# Patient Record
Sex: MALE | Race: WHITE | NOT HISPANIC OR LATINO | Employment: OTHER | URBAN - NONMETROPOLITAN AREA
[De-identification: names, ages, dates, MRNs, and addresses within clinical notes are randomized per-mention and may not be internally consistent; named-entity substitution may affect disease eponyms.]

---

## 2018-01-05 ENCOUNTER — APPOINTMENT (OUTPATIENT)
Dept: GENERAL RADIOLOGY | Facility: HOSPITAL | Age: 83
End: 2018-01-05

## 2018-01-05 ENCOUNTER — HOSPITAL ENCOUNTER (EMERGENCY)
Facility: HOSPITAL | Age: 83
Discharge: HOME | End: 2018-01-05
Attending: EMERGENCY MEDICINE | Admitting: EMERGENCY MEDICINE

## 2018-01-05 VITALS
HEIGHT: 66 IN | BODY MASS INDEX: 25.08 KG/M2 | HEART RATE: 68 BPM | OXYGEN SATURATION: 92 % | RESPIRATION RATE: 18 BRPM | DIASTOLIC BLOOD PRESSURE: 69 MMHG | SYSTOLIC BLOOD PRESSURE: 147 MMHG | WEIGHT: 156.06 LBS | TEMPERATURE: 97.4 F
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
## 2018-01-05 DIAGNOSIS — S29.011A MUSCLE STRAIN OF CHEST WALL, INITIAL ENCOUNTER: ICD-10-CM

## 2018-01-05 PROCEDURE — 96375 TX/PRO/DX INJ NEW DRUG ADDON: CPT

## 2018-01-05 PROCEDURE — 99283 EMERGENCY DEPT VISIT LOW MDM: CPT

## 2018-01-05 PROCEDURE — 96374 THER/PROPH/DIAG INJ IV PUSH: CPT

## 2018-01-05 PROCEDURE — 25010000002 MORPHINE PER 10 MG: Performed by: EMERGENCY MEDICINE

## 2018-01-05 PROCEDURE — 25010000002 ONDANSETRON PER 1 MG: Performed by: EMERGENCY MEDICINE

## 2018-01-05 PROCEDURE — 71101 X-RAY EXAM UNILAT RIBS/CHEST: CPT

## 2018-01-05 RX ORDER — HYDROCODONE BITARTRATE AND ACETAMINOPHEN 5; 325 MG/1; MG/1
1 TABLET ORAL EVERY 6 HOURS PRN
Qty: 10 TABLET | Refills: 0 | Status: SHIPPED | OUTPATIENT
Start: 2018-01-05 | End: 2018-04-16

## 2018-01-05 RX ORDER — ALBUTEROL SULFATE 90 UG/1
2 AEROSOL, METERED RESPIRATORY (INHALATION) EVERY 6 HOURS PRN
Qty: 8 G | Refills: 0 | Status: SHIPPED | OUTPATIENT
Start: 2018-01-05 | End: 2021-01-01

## 2018-01-05 RX ORDER — ONDANSETRON 2 MG/ML
4 INJECTION INTRAMUSCULAR; INTRAVENOUS ONCE
Status: COMPLETED | OUTPATIENT
Start: 2018-01-05 | End: 2018-01-05

## 2018-01-05 RX ORDER — MORPHINE SULFATE 8 MG/ML
4 INJECTION INTRAMUSCULAR; INTRAVENOUS; SUBCUTANEOUS ONCE
Status: COMPLETED | OUTPATIENT
Start: 2018-01-05 | End: 2018-01-05

## 2018-01-05 RX ADMIN — MORPHINE SULFATE 4 MG: 8 INJECTION, SOLUTION INTRAMUSCULAR; INTRAVENOUS at 11:19

## 2018-01-05 RX ADMIN — ONDANSETRON 4 MG: 2 INJECTION INTRAMUSCULAR; INTRAVENOUS at 11:20
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
## 2018-01-05 NOTE — DISCHARGE INSTRUCTIONS
Take pain medications as needed.  Use inhaler regularly.  Do deep breathing exercises.  Follow-up with your primary care physician for further evaluation if it does not feel much improvement.  She might have a occult rib fracture which could be more visible on none x-ray after a week or so.  It needs only pain medication and deep breathing exercises.

## 2018-04-13 ENCOUNTER — CONSULT (OUTPATIENT)
Dept: SURGERY | Facility: CLINIC | Age: 83
End: 2018-04-13

## 2018-04-13 VITALS
HEIGHT: 66 IN | SYSTOLIC BLOOD PRESSURE: 120 MMHG | DIASTOLIC BLOOD PRESSURE: 80 MMHG | WEIGHT: 149.4 LBS | BODY MASS INDEX: 24.01 KG/M2

## 2018-04-13 DIAGNOSIS — K40.90 NON-RECURRENT UNILATERAL INGUINAL HERNIA WITHOUT OBSTRUCTION OR GANGRENE: Primary | ICD-10-CM

## 2018-04-13 PROCEDURE — 99214 OFFICE O/P EST MOD 30 MIN: CPT | Performed by: SURGERY

## 2018-04-13 RX ORDER — GLIPIZIDE 5 MG/1
5 TABLET ORAL
COMMUNITY
End: 2021-01-01

## 2018-04-13 RX ORDER — ACETAMINOPHEN 325 MG/1
650 TABLET ORAL ONCE
Status: CANCELLED | OUTPATIENT
Start: 2018-04-19 | End: 2018-04-19

## 2018-04-13 RX ORDER — LISINOPRIL 10 MG/1
10 TABLET ORAL DAILY
COMMUNITY
Start: 2018-03-13 | End: 2021-01-01 | Stop reason: HOSPADM

## 2018-04-13 RX ORDER — FINASTERIDE 5 MG/1
5 TABLET, FILM COATED ORAL DAILY
COMMUNITY
Start: 2018-03-13

## 2018-04-13 RX ORDER — NIFEDIPINE 30 MG/1
TABLET, EXTENDED RELEASE ORAL
COMMUNITY
Start: 2018-01-19 | End: 2018-04-16

## 2018-04-13 RX ORDER — TAMSULOSIN HYDROCHLORIDE 0.4 MG/1
1 CAPSULE ORAL NIGHTLY
COMMUNITY

## 2018-04-13 RX ORDER — CARVEDILOL 12.5 MG/1
12.5 TABLET ORAL 2 TIMES DAILY WITH MEALS
COMMUNITY
Start: 2018-03-13 | End: 2021-01-01 | Stop reason: HOSPADM

## 2018-04-13 RX ORDER — SODIUM CHLORIDE, SODIUM LACTATE, POTASSIUM CHLORIDE, CALCIUM CHLORIDE 600; 310; 30; 20 MG/100ML; MG/100ML; MG/100ML; MG/100ML
100 INJECTION, SOLUTION INTRAVENOUS CONTINUOUS
Status: CANCELLED | OUTPATIENT
Start: 2018-04-19

## 2018-04-16 ENCOUNTER — APPOINTMENT (OUTPATIENT)
Dept: PREADMISSION TESTING | Facility: HOSPITAL | Age: 83
End: 2018-04-16

## 2018-04-16 VITALS
BODY MASS INDEX: 25.61 KG/M2 | SYSTOLIC BLOOD PRESSURE: 130 MMHG | OXYGEN SATURATION: 96 % | HEIGHT: 64 IN | DIASTOLIC BLOOD PRESSURE: 62 MMHG | RESPIRATION RATE: 14 BRPM | HEART RATE: 72 BPM | WEIGHT: 150 LBS

## 2018-04-16 DIAGNOSIS — K40.90 NON-RECURRENT UNILATERAL INGUINAL HERNIA WITHOUT OBSTRUCTION OR GANGRENE: ICD-10-CM

## 2018-04-16 LAB
ANION GAP SERPL CALCULATED.3IONS-SCNC: 14 MMOL/L (ref 5–15)
BUN BLD-MCNC: 13 MG/DL (ref 7–21)
BUN/CREAT SERPL: 19.4 (ref 7–25)
CALCIUM SPEC-SCNC: 9.7 MG/DL (ref 8.4–10.2)
CHLORIDE SERPL-SCNC: 96 MMOL/L (ref 95–110)
CO2 SERPL-SCNC: 29 MMOL/L (ref 22–31)
CREAT BLD-MCNC: 0.67 MG/DL (ref 0.7–1.3)
GFR SERPL CREATININE-BSD FRML MDRD: 114 ML/MIN/1.73 (ref 42–98)
GLUCOSE BLD-MCNC: 123 MG/DL (ref 60–100)
MRSA DNA SPEC QL NAA+PROBE: NEGATIVE
POTASSIUM BLD-SCNC: 4.3 MMOL/L (ref 3.5–5.1)
SODIUM BLD-SCNC: 139 MMOL/L (ref 137–145)

## 2018-04-16 PROCEDURE — 36415 COLL VENOUS BLD VENIPUNCTURE: CPT

## 2018-04-16 PROCEDURE — 87641 MR-STAPH DNA AMP PROBE: CPT | Performed by: SURGERY

## 2018-04-16 PROCEDURE — 93010 ELECTROCARDIOGRAM REPORT: CPT | Performed by: INTERNAL MEDICINE

## 2018-04-16 PROCEDURE — 93005 ELECTROCARDIOGRAM TRACING: CPT

## 2018-04-16 PROCEDURE — 80048 BASIC METABOLIC PNL TOTAL CA: CPT | Performed by: ANESTHESIOLOGY

## 2018-04-16 RX ORDER — HYDROCODONE BITARTRATE AND ACETAMINOPHEN 10; 325 MG/1; MG/1
1 TABLET ORAL EVERY 6 HOURS PRN
Status: ON HOLD | COMMUNITY
End: 2018-04-26

## 2018-04-16 RX ORDER — ASPIRIN 81 MG/1
81 TABLET ORAL DAILY
COMMUNITY
End: 2018-05-10 | Stop reason: HOSPADM

## 2018-04-16 RX ORDER — NIFEDIPINE 10 MG/1
10 CAPSULE ORAL DAILY
COMMUNITY
End: 2021-01-01

## 2018-04-16 NOTE — PAT
Dr. Bajwa here to review EKG and talk with patient. No new orders received. Chlorhexidine cloth instructions given, patient voiced understanding.

## 2018-04-16 NOTE — DISCHARGE INSTRUCTIONS
[facility]  Pre-op Information and Guidelines    You will be called after 2 p.m. the day before your surgery (Friday for Monday surgery) and notified of your time for arrival and approximate surgery time.  If you have not received a call by 4P.M., please contact Same Day Surgery at [phone] of if outside [facility] call [phone].    Please Follow these Important Safety Guidelines:    • The morning of your procedure, take only the medications listed below with   A sip of water:_____________________________________________       _COREG, NORCO, NIFEDIPINE__________________    • DO NOT eat or drink anything after 12:00 midnight the night before surgery  Specific instructions concerning drinking clear liquids will be discussed during  the pre-surgery instruction call the day before your surgery.    • If you take a blood thinner (ex. Plavix, Coumadin, aspirin), ask your doctor when to stop it before surgery  STOP DATE: _________________    • Only 2 visitors are allowed in patient rooms at a time  Your visitors will be asked to wait in the lobby until the admission process is complete with the exception of a parent with a child and patients in need of special assistance.    • YOU CANNOT DRIVE YOURSELF HOME  You must be accompanied by someone who will be responsible for driving you home after surgery and for your care at home.    • DO NOT chew gum, use breath mints, hard candy, or smoke the day of surgery  • DO NOT drink alcohol for at least 24 hours before your surgery  • DO NOT wear any jewelry and remove all body piercing before coming to the hospital  • DO NOT wear make-up to the hospital  • If you are having surgery on an extremity (arm/leg/foot) remove nail polish/artificial nails on the surgical side  • Clothing, glasses, contacts, dentures, and hairpieces must be removed before surgery  • Bathe the night before or the morning of your surgery and do not use powders/lotions on  skin.

## 2018-04-18 ENCOUNTER — ANESTHESIA EVENT (OUTPATIENT)
Dept: PERIOP | Facility: HOSPITAL | Age: 83
End: 2018-04-18

## 2018-04-19 ENCOUNTER — HOSPITAL ENCOUNTER (OUTPATIENT)
Facility: HOSPITAL | Age: 83
Setting detail: OBSERVATION
Discharge: HOME | End: 2018-04-26
Attending: SURGERY | Admitting: SURGERY

## 2018-04-19 ENCOUNTER — ANESTHESIA (OUTPATIENT)
Dept: PERIOP | Facility: HOSPITAL | Age: 83
End: 2018-04-19

## 2018-04-19 DIAGNOSIS — K40.90 NON-RECURRENT UNILATERAL INGUINAL HERNIA WITHOUT OBSTRUCTION OR GANGRENE: ICD-10-CM

## 2018-04-19 DIAGNOSIS — Z78.9 IMPAIRED MOBILITY AND ACTIVITIES OF DAILY LIVING: ICD-10-CM

## 2018-04-19 DIAGNOSIS — Z74.09 IMPAIRED PHYSICAL MOBILITY: ICD-10-CM

## 2018-04-19 DIAGNOSIS — R33.9 URINARY RETENTION: Primary | ICD-10-CM

## 2018-04-19 DIAGNOSIS — Z74.09 IMPAIRED MOBILITY AND ACTIVITIES OF DAILY LIVING: ICD-10-CM

## 2018-04-19 LAB
GLUCOSE BLDC GLUCOMTR-MCNC: 140 MG/DL (ref 70–130)
GLUCOSE BLDC GLUCOMTR-MCNC: 144 MG/DL (ref 70–130)
GLUCOSE BLDC GLUCOMTR-MCNC: 153 MG/DL (ref 70–130)
GLUCOSE BLDC GLUCOMTR-MCNC: 160 MG/DL (ref 70–130)

## 2018-04-19 PROCEDURE — 25010000002 HYDROMORPHONE PER 4 MG: Performed by: SURGERY

## 2018-04-19 PROCEDURE — 25010000002 ONDANSETRON PER 1 MG: Performed by: SURGERY

## 2018-04-19 PROCEDURE — 63710000001 INSULIN ASPART PER 5 UNITS: Performed by: SURGERY

## 2018-04-19 PROCEDURE — 88302 TISSUE EXAM BY PATHOLOGIST: CPT | Performed by: SURGERY

## 2018-04-19 PROCEDURE — 49525 REPAIR ING HERNIA SLIDING: CPT | Performed by: SURGERY

## 2018-04-19 PROCEDURE — 82962 GLUCOSE BLOOD TEST: CPT

## 2018-04-19 PROCEDURE — 25010000002 CEFOXITIN PER 1 G: Performed by: SURGERY

## 2018-04-19 PROCEDURE — 25010000002 ONDANSETRON PER 1 MG: Performed by: NURSE ANESTHETIST, CERTIFIED REGISTERED

## 2018-04-19 PROCEDURE — 25010000003 CEFAZOLIN PER 500 MG: Performed by: SURGERY

## 2018-04-19 PROCEDURE — 88302 TISSUE EXAM BY PATHOLOGIST: CPT | Performed by: PATHOLOGY

## 2018-04-19 PROCEDURE — 25010000002 PROPOFOL 10 MG/ML EMULSION: Performed by: NURSE ANESTHETIST, CERTIFIED REGISTERED

## 2018-04-19 PROCEDURE — 25010000002 NEOSTIGMINE PER 0.5 MG: Performed by: NURSE ANESTHETIST, CERTIFIED REGISTERED

## 2018-04-19 PROCEDURE — 25010000002 FENTANYL CITRATE (PF) 100 MCG/2ML SOLUTION: Performed by: NURSE ANESTHETIST, CERTIFIED REGISTERED

## 2018-04-19 PROCEDURE — 25010000002 DEXAMETHASONE PER 1 MG: Performed by: NURSE ANESTHETIST, CERTIFIED REGISTERED

## 2018-04-19 RX ORDER — ACETAMINOPHEN 325 MG/1
650 TABLET ORAL ONCE
Status: COMPLETED | OUTPATIENT
Start: 2018-04-19 | End: 2018-04-19

## 2018-04-19 RX ORDER — ONDANSETRON 4 MG/1
4 TABLET, ORALLY DISINTEGRATING ORAL EVERY 6 HOURS PRN
Status: DISCONTINUED | OUTPATIENT
Start: 2018-04-19 | End: 2018-04-26 | Stop reason: HOSPADM

## 2018-04-19 RX ORDER — NIFEDIPINE 10 MG/1
10 CAPSULE ORAL DAILY
Status: DISCONTINUED | OUTPATIENT
Start: 2018-04-19 | End: 2018-04-26 | Stop reason: HOSPADM

## 2018-04-19 RX ORDER — LIDOCAINE HYDROCHLORIDE 20 MG/ML
INJECTION, SOLUTION INFILTRATION; PERINEURAL AS NEEDED
Status: DISCONTINUED | OUTPATIENT
Start: 2018-04-19 | End: 2018-04-19 | Stop reason: SURG

## 2018-04-19 RX ORDER — PROPOFOL 10 MG/ML
VIAL (ML) INTRAVENOUS AS NEEDED
Status: DISCONTINUED | OUTPATIENT
Start: 2018-04-19 | End: 2018-04-19 | Stop reason: SURG

## 2018-04-19 RX ORDER — NALOXONE HCL 0.4 MG/ML
0.2 VIAL (ML) INJECTION AS NEEDED
Status: DISCONTINUED | OUTPATIENT
Start: 2018-04-19 | End: 2018-04-19 | Stop reason: HOSPADM

## 2018-04-19 RX ORDER — GLYCOPYRROLATE 0.2 MG/ML
INJECTION INTRAMUSCULAR; INTRAVENOUS AS NEEDED
Status: DISCONTINUED | OUTPATIENT
Start: 2018-04-19 | End: 2018-04-19 | Stop reason: SURG

## 2018-04-19 RX ORDER — ONDANSETRON 2 MG/ML
INJECTION INTRAMUSCULAR; INTRAVENOUS AS NEEDED
Status: DISCONTINUED | OUTPATIENT
Start: 2018-04-19 | End: 2018-04-19 | Stop reason: SURG

## 2018-04-19 RX ORDER — BUPIVACAINE HYDROCHLORIDE AND EPINEPHRINE 5; 5 MG/ML; UG/ML
INJECTION, SOLUTION EPIDURAL; INTRACAUDAL; PERINEURAL AS NEEDED
Status: DISCONTINUED | OUTPATIENT
Start: 2018-04-19 | End: 2018-04-26 | Stop reason: HOSPADM

## 2018-04-19 RX ORDER — HYDROCODONE BITARTRATE AND ACETAMINOPHEN 10; 325 MG/1; MG/1
1 TABLET ORAL EVERY 6 HOURS PRN
Status: DISCONTINUED | OUTPATIENT
Start: 2018-04-19 | End: 2018-04-21

## 2018-04-19 RX ORDER — ALBUTEROL SULFATE 90 UG/1
2 AEROSOL, METERED RESPIRATORY (INHALATION) EVERY 6 HOURS PRN
Status: DISCONTINUED | OUTPATIENT
Start: 2018-04-19 | End: 2018-04-26 | Stop reason: HOSPADM

## 2018-04-19 RX ORDER — EPHEDRINE SULFATE 50 MG/ML
5 INJECTION, SOLUTION INTRAVENOUS ONCE AS NEEDED
Status: DISCONTINUED | OUTPATIENT
Start: 2018-04-19 | End: 2018-04-19 | Stop reason: HOSPADM

## 2018-04-19 RX ORDER — LISINOPRIL 10 MG/1
10 TABLET ORAL DAILY
Status: DISCONTINUED | OUTPATIENT
Start: 2018-04-19 | End: 2018-04-26 | Stop reason: HOSPADM

## 2018-04-19 RX ORDER — DEXTROSE MONOHYDRATE 25 G/50ML
25 INJECTION, SOLUTION INTRAVENOUS
Status: DISCONTINUED | OUTPATIENT
Start: 2018-04-19 | End: 2018-04-26 | Stop reason: HOSPADM

## 2018-04-19 RX ORDER — DIPHENHYDRAMINE HYDROCHLORIDE 50 MG/ML
12.5 INJECTION INTRAMUSCULAR; INTRAVENOUS
Status: DISCONTINUED | OUTPATIENT
Start: 2018-04-19 | End: 2018-04-19 | Stop reason: HOSPADM

## 2018-04-19 RX ORDER — NICOTINE POLACRILEX 4 MG
15 LOZENGE BUCCAL
Status: DISCONTINUED | OUTPATIENT
Start: 2018-04-19 | End: 2018-04-26 | Stop reason: HOSPADM

## 2018-04-19 RX ORDER — TAMSULOSIN HYDROCHLORIDE 0.4 MG/1
0.4 CAPSULE ORAL NIGHTLY
Status: DISCONTINUED | OUTPATIENT
Start: 2018-04-19 | End: 2018-04-26 | Stop reason: HOSPADM

## 2018-04-19 RX ORDER — ONDANSETRON 2 MG/ML
4 INJECTION INTRAMUSCULAR; INTRAVENOUS ONCE AS NEEDED
Status: DISCONTINUED | OUTPATIENT
Start: 2018-04-19 | End: 2018-04-19 | Stop reason: HOSPADM

## 2018-04-19 RX ORDER — SODIUM CHLORIDE, SODIUM LACTATE, POTASSIUM CHLORIDE, CALCIUM CHLORIDE 600; 310; 30; 20 MG/100ML; MG/100ML; MG/100ML; MG/100ML
100 INJECTION, SOLUTION INTRAVENOUS CONTINUOUS
Status: DISCONTINUED | OUTPATIENT
Start: 2018-04-19 | End: 2018-04-21

## 2018-04-19 RX ORDER — SODIUM CHLORIDE, SODIUM LACTATE, POTASSIUM CHLORIDE, CALCIUM CHLORIDE 600; 310; 30; 20 MG/100ML; MG/100ML; MG/100ML; MG/100ML
100 INJECTION, SOLUTION INTRAVENOUS CONTINUOUS
Status: DISCONTINUED | OUTPATIENT
Start: 2018-04-19 | End: 2018-04-19 | Stop reason: HOSPADM

## 2018-04-19 RX ORDER — ONDANSETRON 4 MG/1
4 TABLET, FILM COATED ORAL EVERY 6 HOURS PRN
Status: DISCONTINUED | OUTPATIENT
Start: 2018-04-19 | End: 2018-04-26 | Stop reason: HOSPADM

## 2018-04-19 RX ORDER — LABETALOL HYDROCHLORIDE 5 MG/ML
5 INJECTION, SOLUTION INTRAVENOUS
Status: DISCONTINUED | OUTPATIENT
Start: 2018-04-19 | End: 2018-04-19 | Stop reason: HOSPADM

## 2018-04-19 RX ORDER — FENTANYL CITRATE 50 UG/ML
INJECTION, SOLUTION INTRAMUSCULAR; INTRAVENOUS AS NEEDED
Status: DISCONTINUED | OUTPATIENT
Start: 2018-04-19 | End: 2018-04-19 | Stop reason: SURG

## 2018-04-19 RX ORDER — ASPIRIN 81 MG/1
81 TABLET ORAL DAILY
Status: DISCONTINUED | OUTPATIENT
Start: 2018-04-19 | End: 2018-04-26 | Stop reason: HOSPADM

## 2018-04-19 RX ORDER — NALOXONE HCL 0.4 MG/ML
0.1 VIAL (ML) INJECTION
Status: DISCONTINUED | OUTPATIENT
Start: 2018-04-19 | End: 2018-04-21

## 2018-04-19 RX ORDER — MEPERIDINE HYDROCHLORIDE 50 MG/ML
12.5 INJECTION INTRAMUSCULAR; INTRAVENOUS; SUBCUTANEOUS
Status: DISCONTINUED | OUTPATIENT
Start: 2018-04-19 | End: 2018-04-19 | Stop reason: HOSPADM

## 2018-04-19 RX ORDER — DEXAMETHASONE SODIUM PHOSPHATE 4 MG/ML
INJECTION, SOLUTION INTRA-ARTICULAR; INTRALESIONAL; INTRAMUSCULAR; INTRAVENOUS; SOFT TISSUE AS NEEDED
Status: DISCONTINUED | OUTPATIENT
Start: 2018-04-19 | End: 2018-04-19 | Stop reason: SURG

## 2018-04-19 RX ORDER — FLUMAZENIL 0.1 MG/ML
0.2 INJECTION INTRAVENOUS AS NEEDED
Status: DISCONTINUED | OUTPATIENT
Start: 2018-04-19 | End: 2018-04-19 | Stop reason: HOSPADM

## 2018-04-19 RX ORDER — FINASTERIDE 5 MG/1
5 TABLET, FILM COATED ORAL DAILY
Status: DISCONTINUED | OUTPATIENT
Start: 2018-04-19 | End: 2018-04-26 | Stop reason: HOSPADM

## 2018-04-19 RX ORDER — VECURONIUM BROMIDE 20 MG/20ML
INJECTION, POWDER, LYOPHILIZED, FOR SOLUTION INTRAVENOUS AS NEEDED
Status: DISCONTINUED | OUTPATIENT
Start: 2018-04-19 | End: 2018-04-19 | Stop reason: SURG

## 2018-04-19 RX ORDER — ACETAMINOPHEN 325 MG/1
650 TABLET ORAL ONCE AS NEEDED
Status: DISCONTINUED | OUTPATIENT
Start: 2018-04-19 | End: 2018-04-19 | Stop reason: HOSPADM

## 2018-04-19 RX ORDER — ONDANSETRON 2 MG/ML
4 INJECTION INTRAMUSCULAR; INTRAVENOUS EVERY 6 HOURS PRN
Status: DISCONTINUED | OUTPATIENT
Start: 2018-04-19 | End: 2018-04-26 | Stop reason: HOSPADM

## 2018-04-19 RX ORDER — CARVEDILOL 12.5 MG/1
12.5 TABLET ORAL 2 TIMES DAILY WITH MEALS
Status: DISCONTINUED | OUTPATIENT
Start: 2018-04-19 | End: 2018-04-26 | Stop reason: HOSPADM

## 2018-04-19 RX ORDER — ACETAMINOPHEN 650 MG/1
650 SUPPOSITORY RECTAL ONCE AS NEEDED
Status: DISCONTINUED | OUTPATIENT
Start: 2018-04-19 | End: 2018-04-19 | Stop reason: HOSPADM

## 2018-04-19 RX ADMIN — METRONIDAZOLE 500 MG: 500 INJECTION, SOLUTION INTRAVENOUS at 13:53

## 2018-04-19 RX ADMIN — SODIUM CHLORIDE, POTASSIUM CHLORIDE, SODIUM LACTATE AND CALCIUM CHLORIDE 100 ML/HR: 600; 310; 30; 20 INJECTION, SOLUTION INTRAVENOUS at 10:25

## 2018-04-19 RX ADMIN — ACETAMINOPHEN 650 MG: 325 TABLET ORAL at 10:25

## 2018-04-19 RX ADMIN — ASPIRIN 81 MG: 81 TABLET, COATED ORAL at 17:19

## 2018-04-19 RX ADMIN — DEXAMETHASONE SODIUM PHOSPHATE 4 MG: 4 INJECTION, SOLUTION INTRAMUSCULAR; INTRAVENOUS at 13:04

## 2018-04-19 RX ADMIN — PROPOFOL 180 MG: 10 INJECTION, EMULSION INTRAVENOUS at 12:54

## 2018-04-19 RX ADMIN — LIDOCAINE HYDROCHLORIDE 80 MG: 20 INJECTION, SOLUTION INFILTRATION; PERINEURAL at 12:54

## 2018-04-19 RX ADMIN — SODIUM CHLORIDE, POTASSIUM CHLORIDE, SODIUM LACTATE AND CALCIUM CHLORIDE 100 ML/HR: 600; 310; 30; 20 INJECTION, SOLUTION INTRAVENOUS at 17:21

## 2018-04-19 RX ADMIN — HYDROCODONE BITARTRATE AND ACETAMINOPHEN 1 TABLET: 10; 325 TABLET ORAL at 17:28

## 2018-04-19 RX ADMIN — NIFEDIPINE 10 MG: 10 CAPSULE ORAL at 17:19

## 2018-04-19 RX ADMIN — CEFOXITIN 2 G: 2 INJECTION, POWDER, FOR SOLUTION INTRAVENOUS at 22:05

## 2018-04-19 RX ADMIN — CARVEDILOL 12.5 MG: 12.5 TABLET, FILM COATED ORAL at 17:20

## 2018-04-19 RX ADMIN — INSULIN ASPART 2 UNITS: 100 INJECTION, SOLUTION INTRAVENOUS; SUBCUTANEOUS at 22:05

## 2018-04-19 RX ADMIN — VECURONIUM BROMIDE 5 MG: 1 INJECTION, POWDER, LYOPHILIZED, FOR SOLUTION INTRAVENOUS at 12:54

## 2018-04-19 RX ADMIN — FINASTERIDE 5 MG: 5 TABLET, FILM COATED ORAL at 17:19

## 2018-04-19 RX ADMIN — GLYCOPYRROLATE 0.6 MG: 0.2 INJECTION, SOLUTION INTRAMUSCULAR; INTRAVENOUS at 14:31

## 2018-04-19 RX ADMIN — CEFOXITIN 2 G: 2 INJECTION, POWDER, FOR SOLUTION INTRAVENOUS at 17:19

## 2018-04-19 RX ADMIN — ONDANSETRON 4 MG: 2 INJECTION INTRAMUSCULAR; INTRAVENOUS at 14:11

## 2018-04-19 RX ADMIN — Medication 3 MG: at 14:31

## 2018-04-19 RX ADMIN — FENTANYL CITRATE 100 MCG: 50 INJECTION, SOLUTION INTRAMUSCULAR; INTRAVENOUS at 12:54

## 2018-04-19 RX ADMIN — FENTANYL CITRATE 100 MCG: 50 INJECTION, SOLUTION INTRAMUSCULAR; INTRAVENOUS at 13:14

## 2018-04-19 RX ADMIN — HYDROMORPHONE HYDROCHLORIDE 1 MG: 1 INJECTION, SOLUTION INTRAMUSCULAR; INTRAVENOUS; SUBCUTANEOUS at 19:11

## 2018-04-19 RX ADMIN — TAMSULOSIN HYDROCHLORIDE 0.4 MG: 0.4 CAPSULE ORAL at 22:05

## 2018-04-19 RX ADMIN — CEFAZOLIN SODIUM 2 G: 1 INJECTION, POWDER, FOR SOLUTION INTRAMUSCULAR; INTRAVENOUS at 13:02

## 2018-04-19 RX ADMIN — ONDANSETRON 4 MG: 2 INJECTION INTRAMUSCULAR; INTRAVENOUS at 20:26

## 2018-04-19 RX ADMIN — LISINOPRIL 10 MG: 10 TABLET ORAL at 17:19

## 2018-04-19 NOTE — ANESTHESIA PREPROCEDURE EVALUATION
Anesthesia Evaluation     Patient summary reviewed and Nursing notes reviewed   NPO Solid Status: > 8 hours  NPO Liquid Status: > 8 hours           Airway   Mallampati: II  TM distance: >3 FB  Neck ROM: full  possible difficult intubation  Dental    (+) edentulous    Pulmonary - normal exam    breath sounds clear to auscultation  (+) a smoker Former, COPD moderate, decreased breath sounds,   Cardiovascular - normal exam    ECG reviewed  Patient on routine beta blocker and Beta blocker given within 24 hours of surgery  Rhythm: regular  Rate: normal    (+) hypertension,   (-) murmur, carotid bruits    ROS comment:   Normal sinus rhythm  Right bundle branch block  Abnormal ECG  When compared with ECG of 16-NOV-2013 11:32,  Right bundle branch block is now present  Confirmed by DEEPTHI LINARES, B. N. ([id number]),  EVY QUEEN ([id number]) on  4/17/2018 7:29:09 AM    Neuro/Psych- negative ROS  GI/Hepatic/Renal/Endo    (+)   diabetes mellitus type 2,     Musculoskeletal (-) negative ROS    Abdominal    Substance History - negative use     OB/GYN negative ob/gyn ROS         Other - negative ROS                       Anesthesia Plan    ASA 3     general     intravenous induction   Anesthetic plan and risks discussed with patient, spouse/significant other and child.

## 2018-04-19 NOTE — PROGRESS NOTES
Chief Complaint   Patient presents with   • Hernia     Left inguinal hernia.        Hernia   This is a new problem. The current episode started more than 1 month ago. The problem occurs constantly. The problem has been gradually worsening. Pertinent negatives include no abdominal pain, anorexia, arthralgias, change in bowel habit, chest pain, chills, congestion, coughing, diaphoresis, fatigue, fever, headaches, joint swelling, myalgias, nausea, neck pain, numbness, rash, sore throat, swollen glands, urinary symptoms, vertigo, visual change, vomiting or weakness. Nothing aggravates the symptoms. He has tried nothing for the symptoms.   This gentleman is 82 years old and presents for repair of a very large left inguinal hernia.  He has no history of incarceration or intestinal obstruction.    Past Medical History:   Diagnosis Date   • Arthritis    • Broken ribs 01/05/2018   • Diabetes mellitus    • Hypertension    • Inguinal hernia        Past Surgical History:   Procedure Laterality Date   • APPENDECTOMY  1980's         Current Outpatient Prescriptions:   •  albuterol (PROVENTIL HFA;VENTOLIN HFA) 108 (90 Base) MCG/ACT inhaler, Inhale 2 puffs Every 6 (Six) Hours As Needed for Wheezing., Disp: 8 g, Rfl: 0  •  carvedilol (COREG) 12.5 MG tablet, Take 12.5 mg by mouth 2 (Two) Times a Day With Meals., Disp: , Rfl:   •  finasteride (PROSCAR) 5 MG tablet, Take 5 mg by mouth Daily., Disp: , Rfl:   •  glipiZIDE (GLUCOTROL) 5 MG tablet, Take 5 mg by mouth 2 (Two) Times a Day Before Meals., Disp: , Rfl:   •  lisinopril (PRINIVIL,ZESTRIL) 10 MG tablet, Take 10 mg by mouth Daily., Disp: , Rfl:   •  metFORMIN (GLUCOPHAGE) 500 MG tablet, Take 500 mg by mouth 2 (Two) Times a Day With Meals., Disp: , Rfl:   •  tamsulosin (FLOMAX) 0.4 MG capsule 24 hr capsule, Take 1 capsule by mouth Every Night., Disp: , Rfl:   •  aspirin 81 MG EC tablet, Take 81 mg by mouth Daily., Disp: , Rfl:   •  HYDROcodone-acetaminophen (NORCO)  MG per  tablet, Take 1 tablet by mouth Every 6 (Six) Hours As Needed for Moderate Pain ., Disp: , Rfl:   •  Multiple Vitamins-Calcium (ONE-A-DAY WITHIN PO), Take 1 tablet by mouth Daily., Disp: , Rfl:   •  NIFEdipine (PROCARDIA) 10 MG capsule, Take 10 mg by mouth Daily., Disp: , Rfl:     No Known Allergies    No family history on file.    Social History     Social History   • Marital status:      Spouse name: N/A   • Number of children: N/A   • Years of education: N/A     Occupational History   • Not on file.     Social History Main Topics   • Smoking status: Former Smoker     Packs/day: 1.00     Types: Cigarettes     Quit date: 2003   • Smokeless tobacco: Never Used      Comment: smoke for 50 years   • Alcohol use Yes      Comment: occasionally   • Drug use: No   • Sexual activity: Defer     Other Topics Concern   • Not on file     Social History Narrative   • No narrative on file       Review of Systems   Constitutional: Negative for activity change, appetite change, chills, diaphoresis, fatigue and fever.   HENT: Negative for congestion, hearing loss, nosebleeds, sore throat and trouble swallowing.    Respiratory: Negative for cough.    Cardiovascular: Negative for chest pain, palpitations and leg swelling.   Gastrointestinal: Negative for abdominal distention, abdominal pain, anal bleeding, anorexia, blood in stool, change in bowel habit, constipation, diarrhea, nausea, rectal pain and vomiting.   Endocrine: Negative for cold intolerance, heat intolerance, polydipsia and polyuria.   Genitourinary: Negative for decreased urine volume, difficulty urinating, dysuria, enuresis, frequency, hematuria and urgency.   Musculoskeletal: Negative for arthralgias, back pain, gait problem, joint swelling, myalgias and neck pain.   Skin: Negative for pallor, rash and wound.   Allergic/Immunologic: Negative for immunocompromised state.   Neurological: Negative for dizziness, vertigo, seizures, weakness, light-headedness,  numbness and headaches.   Psychiatric/Behavioral: Negative for agitation and behavioral problems. The patient is not nervous/anxious.        Physical Exam   Constitutional: He is oriented to person, place, and time. He appears well-developed and well-nourished.   HENT:   Head: Normocephalic and atraumatic.   Nose: Nose normal.   Eyes: Conjunctivae and EOM are normal. Right eye exhibits no discharge. Left eye exhibits no discharge.   Neck: Trachea normal, normal range of motion and phonation normal. Neck supple. No JVD present. No tracheal deviation and no edema present. No thyromegaly present.   Cardiovascular: Normal rate, regular rhythm and normal heart sounds.  Exam reveals no gallop and no friction rub.    No murmur heard.  Pulmonary/Chest: Effort normal and breath sounds normal. No accessory muscle usage. No respiratory distress. He has no decreased breath sounds. He has no wheezes. He has no rales. He exhibits no tenderness.   Abdominal: Soft. He exhibits no distension, no fluid wave, no ascites, no pulsatile midline mass and no mass. There is no tenderness. There is no rebound and no guarding. A hernia (a very large partially reducible left inguinal hernia is noted) is present.   Musculoskeletal: Normal range of motion. He exhibits no edema, tenderness or deformity.   Lymphadenopathy:     He has no cervical adenopathy.        Left: No supraclavicular adenopathy present.   Neurological: He is alert and oriented to person, place, and time. He has normal strength. No cranial nerve deficit.   Skin: Skin is warm and dry. No rash noted. He is not diaphoretic. No erythema. No pallor.   Psychiatric: He has a normal mood and affect. His speech is normal and behavior is normal. Judgment and thought content normal. Cognition and memory are normal.   Vitals reviewed.        ASSESSMENT    Eze was seen today for hernia.    Diagnoses and all orders for this visit:    Non-recurrent unilateral inguinal hernia without  "obstruction or gangrene  -     ECG 12 Lead; Future  -     lactated ringers infusion; Infuse 100 mL/hr into a venous catheter Continuous.  -     ceFAZolin (ANCEF) 2 g in sodium chloride 0.9 % 100 mL IVPB; Infuse 2 g into a venous catheter 1 (One) Time.  -     acetaminophen (TYLENOL) tablet 650 mg; Take 2 tablets by mouth 1 (One) Time.  -     Case Request; Standing  -     Case Request    Other orders  -     Follow anesthesia standing orders.  -     Provide instructions to patient on NPO status  -     Follow anesthesia standing orders.; Standing  -     Instructions on coughing, deep breathing, and incentive spirometry.; Standing  -     Verify NPO Status; Standing  -     Obtain informed consent; Standing  -     SCD (sequential compression device)- to be placed on patient in Pre-op; Standing  -     Clip operative site; Standing        PLAN  1.  Open repair of left inguinal hernia perhaps with the use of mesh    The following items were discussed with the patient:    1. What are the indications that have led your doctor to the opinion that an operation is necessary?    A symptomatic bulge is present for which operation has been suggested.    What, if any, alternative treatments are available for your condition?    Alternatives to surgery have been explained including watchful waiting, noting that patients who are asymptomatic or \"minimally symptomatic\" may be managed without surgical intervention.    What will be the likely result if you don't have the operation?    Hernias may grow in size, or become tender, incarcerated, or cause intestinal obstruction. While the risk of these events is low, the risk with symptomatic hernias is higher.     What are the basic procedures involved in the operation?    A small incision or incisions are made and the defect is repaired and supported with permanent mesh.     What are the risks?    There are risks of bleeding, infection requiring antibiotics and possibly further surgery, injury " to nearby structures, nerve injury, wound complications, recurrence of hernia. The risk of chronic pain may be as high as 10%, although the risk debilitating pain is approximately 2%. Urinary retention requiring catheterization may occur due to spasm of the bladder muscles in 1 in 100, and is more common in elderly males. In male patients with groin hernia repairs, the testicle and the contents of the scrotum may swell due to tissue  damage during surgery or bleeding during or after surgery. Also the penis may show bruising.   Events such as severe bleeding, the need for blood transfusion(s), heart irregularity or stoppage may occur, but are uncommon.  Bleeding is more common if  blood thinning drugs (such as Warfarin, Asprin, Clopidogrel or Dipyridamole)  are taken. Blood clot in the leg (DVT) causing pain and swelling is possible. In rare cases part of the clot may break off and go to the lungs.   Smoking slows wound healing and affects  the heart, lungs and circulation. Giving up smoking more than 2 weeks before the operation will help reduce the risk.  Any complication may require a prolonged hospitalization, a modified incision or additional surgery.    How is the operation expected to improve your health or quality of life?    Operations will decrease risks of serious events. It will relieve the discomfort of bulging.    Is hospitalization necessary and, if so, how long can you expect to be hospitalized?    The operation is usually performed on an outpatient basis under general anesthesia. Occasionally, overnight stay is necessary due to medical co-morbidities, the nature of the operation, or pain control.    What can you expect during your recovery period?    Incisional pain controlled is controlled with a combination of narcotic and non-narcotic oral pain medicines. The incisional areas may become swollen and bruised.       When can you expect to resume normal activities?    Activities (except lifting and  straining) may be resumed within a few days. There is to be no lifting over 5 pounds for 6 weeks.    Are there likely to be residual effects from the operation?    Usually none, although pain and numbness are possible.     All questions were answered. The patient agrees to operation.            This document has been electronically signed by Ramin Collado MD on April 18, 2018 11:44 PM

## 2018-04-19 NOTE — ANESTHESIA POSTPROCEDURE EVALUATION
Patient: Eze Downing    Procedure Summary     Date:  04/19/18 Room / Location:  [facility] OR 03 /  [facility] OR    Anesthesia Start:  1246 Anesthesia Stop:  1451    Procedure:  OPEN REPAIR OF A LARGE INGUINAL HERNIA (Left Abdomen) Diagnosis:       Non-recurrent unilateral inguinal hernia without obstruction or gangrene      (Non-recurrent unilateral inguinal hernia without obstruction or gangrene [K40.90])    Surgeon:  Ramin Collado MD Provider:  Logan Ramirez MD    Anesthesia Type:  general ASA Status:  3          Anesthesia Type: general  Last vitals  BP   166/77 (04/19/18 1017)   Temp   97.5 °F (36.4 °C) (04/19/18 1017)   Pulse   70 (04/19/18 1017)   Resp   20 (04/19/18 1017)     SpO2   96 % (04/19/18 1017)     Post Anesthesia Care and Evaluation    Patient location during evaluation: PACU  Patient participation: complete - patient participated  Level of consciousness: awake and awake and alert  Pain score: 2  Pain management: satisfactory to patient  Airway patency: patent  Anesthetic complications: No anesthetic complications  PONV Status: none  Cardiovascular status: acceptable and stable  Respiratory status: acceptable, room air, unassisted and spontaneous ventilation  Hydration status: acceptable

## 2018-04-19 NOTE — ANESTHESIA PROCEDURE NOTES
Airway  Urgency: elective      General Information and Staff    Patient location during procedure: OR    Indications and Patient Condition  Indications for airway management: airway protection    Preoxygenated: yes  Mask difficulty assessment: 2 - vent by mask + OA or adjuvant +/- NMBA    Final Airway Details  Final airway type: endotracheal airway      Successful airway: ETT  Cuffed: yes   Successful intubation technique: direct laryngoscopy  Facilitating devices/methods: intubating stylet  Endotracheal tube insertion site: oral  Blade: Anselmo  Blade size: #3  ETT size: 7.5 mm  Cormack-Lehane Classification: grade I - full view of glottis  Placement verified by: chest auscultation and capnometry   Measured from: lips  ETT to lips (cm): 22  Number of attempts at approach: 1

## 2018-04-20 LAB
GLUCOSE BLDC GLUCOMTR-MCNC: 136 MG/DL (ref 70–130)
GLUCOSE BLDC GLUCOMTR-MCNC: 154 MG/DL (ref 70–130)
GLUCOSE BLDC GLUCOMTR-MCNC: 193 MG/DL (ref 70–130)
LAB AP CASE REPORT: NORMAL
Lab: NORMAL
PATH REPORT.FINAL DX SPEC: NORMAL
PATH REPORT.GROSS SPEC: NORMAL

## 2018-04-20 PROCEDURE — 25010000002 CEFOXITIN PER 1 G: Performed by: SURGERY

## 2018-04-20 PROCEDURE — 25010000002 ENOXAPARIN PER 10 MG: Performed by: SURGERY

## 2018-04-20 PROCEDURE — 25010000002 PROMETHAZINE PER 50 MG: Performed by: SURGERY

## 2018-04-20 PROCEDURE — 63710000001 INSULIN ASPART PER 5 UNITS: Performed by: SURGERY

## 2018-04-20 PROCEDURE — 82962 GLUCOSE BLOOD TEST: CPT

## 2018-04-20 PROCEDURE — 25010000002 ONDANSETRON PER 1 MG: Performed by: SURGERY

## 2018-04-20 PROCEDURE — 25010000002 HYDROMORPHONE PER 4 MG: Performed by: SURGERY

## 2018-04-20 RX ORDER — FAMOTIDINE 20 MG/1
20 TABLET, FILM COATED ORAL 2 TIMES DAILY
Status: DISCONTINUED | OUTPATIENT
Start: 2018-04-20 | End: 2018-04-26 | Stop reason: HOSPADM

## 2018-04-20 RX ORDER — PROMETHAZINE HYDROCHLORIDE 25 MG/ML
12.5 INJECTION, SOLUTION INTRAMUSCULAR; INTRAVENOUS EVERY 4 HOURS PRN
Status: DISCONTINUED | OUTPATIENT
Start: 2018-04-20 | End: 2018-04-26 | Stop reason: HOSPADM

## 2018-04-20 RX ADMIN — HYDROMORPHONE HYDROCHLORIDE 1 MG: 1 INJECTION, SOLUTION INTRAMUSCULAR; INTRAVENOUS; SUBCUTANEOUS at 00:26

## 2018-04-20 RX ADMIN — INSULIN ASPART 2 UNITS: 100 INJECTION, SOLUTION INTRAVENOUS; SUBCUTANEOUS at 08:33

## 2018-04-20 RX ADMIN — HYDROCODONE BITARTRATE AND ACETAMINOPHEN 1 TABLET: 10; 325 TABLET ORAL at 20:34

## 2018-04-20 RX ADMIN — CARVEDILOL 12.5 MG: 12.5 TABLET, FILM COATED ORAL at 08:33

## 2018-04-20 RX ADMIN — SODIUM CHLORIDE, POTASSIUM CHLORIDE, SODIUM LACTATE AND CALCIUM CHLORIDE 100 ML/HR: 600; 310; 30; 20 INJECTION, SOLUTION INTRAVENOUS at 05:49

## 2018-04-20 RX ADMIN — ASPIRIN 81 MG: 81 TABLET, COATED ORAL at 08:33

## 2018-04-20 RX ADMIN — ENOXAPARIN SODIUM 40 MG: 40 INJECTION SUBCUTANEOUS at 08:33

## 2018-04-20 RX ADMIN — LISINOPRIL 10 MG: 10 TABLET ORAL at 08:33

## 2018-04-20 RX ADMIN — HYDROMORPHONE HYDROCHLORIDE 1 MG: 1 INJECTION, SOLUTION INTRAMUSCULAR; INTRAVENOUS; SUBCUTANEOUS at 14:04

## 2018-04-20 RX ADMIN — SODIUM CHLORIDE, POTASSIUM CHLORIDE, SODIUM LACTATE AND CALCIUM CHLORIDE 100 ML/HR: 600; 310; 30; 20 INJECTION, SOLUTION INTRAVENOUS at 16:43

## 2018-04-20 RX ADMIN — TAMSULOSIN HYDROCHLORIDE 0.4 MG: 0.4 CAPSULE ORAL at 20:33

## 2018-04-20 RX ADMIN — HYDROCODONE BITARTRATE AND ACETAMINOPHEN 1 TABLET: 10; 325 TABLET ORAL at 11:26

## 2018-04-20 RX ADMIN — ONDANSETRON 4 MG: 2 INJECTION INTRAMUSCULAR; INTRAVENOUS at 14:04

## 2018-04-20 RX ADMIN — NIFEDIPINE 10 MG: 10 CAPSULE ORAL at 08:33

## 2018-04-20 RX ADMIN — PROMETHAZINE HYDROCHLORIDE 12.5 MG: 25 INJECTION, SOLUTION INTRAMUSCULAR; INTRAVENOUS at 17:44

## 2018-04-20 RX ADMIN — HYDROCODONE BITARTRATE AND ACETAMINOPHEN 1 TABLET: 10; 325 TABLET ORAL at 05:44

## 2018-04-20 RX ADMIN — CARVEDILOL 12.5 MG: 12.5 TABLET, FILM COATED ORAL at 17:44

## 2018-04-20 RX ADMIN — FAMOTIDINE 20 MG: 20 TABLET ORAL at 20:33

## 2018-04-20 RX ADMIN — ONDANSETRON 4 MG: 4 TABLET, ORALLY DISINTEGRATING ORAL at 21:16

## 2018-04-20 RX ADMIN — HYDROMORPHONE HYDROCHLORIDE 1 MG: 1 INJECTION, SOLUTION INTRAMUSCULAR; INTRAVENOUS; SUBCUTANEOUS at 20:01

## 2018-04-20 RX ADMIN — CEFOXITIN 2 G: 2 INJECTION, POWDER, FOR SOLUTION INTRAVENOUS at 05:02

## 2018-04-20 RX ADMIN — FINASTERIDE 5 MG: 5 TABLET, FILM COATED ORAL at 08:33

## 2018-04-20 RX ADMIN — ONDANSETRON 4 MG: 2 INJECTION INTRAMUSCULAR; INTRAVENOUS at 08:33

## 2018-04-20 RX ADMIN — INSULIN ASPART 2 UNITS: 100 INJECTION, SOLUTION INTRAVENOUS; SUBCUTANEOUS at 17:44

## 2018-04-20 RX ADMIN — HYDROMORPHONE HYDROCHLORIDE 1 MG: 1 INJECTION, SOLUTION INTRAMUSCULAR; INTRAVENOUS; SUBCUTANEOUS at 16:43

## 2018-04-21 LAB
GLUCOSE BLDC GLUCOMTR-MCNC: 147 MG/DL (ref 70–130)
GLUCOSE BLDC GLUCOMTR-MCNC: 159 MG/DL (ref 70–130)
GLUCOSE BLDC GLUCOMTR-MCNC: 198 MG/DL (ref 70–130)
GLUCOSE BLDC GLUCOMTR-MCNC: 224 MG/DL (ref 70–130)

## 2018-04-21 PROCEDURE — 25010000002 HYDROMORPHONE PER 4 MG: Performed by: SURGERY

## 2018-04-21 PROCEDURE — 82962 GLUCOSE BLOOD TEST: CPT

## 2018-04-21 PROCEDURE — 63710000001 INSULIN ASPART PER 5 UNITS: Performed by: SURGERY

## 2018-04-21 PROCEDURE — 25010000002 ENOXAPARIN PER 10 MG: Performed by: SURGERY

## 2018-04-21 PROCEDURE — 25010000002 PROMETHAZINE PER 50 MG: Performed by: SURGERY

## 2018-04-21 RX ORDER — DEXTROSE, SODIUM CHLORIDE, AND POTASSIUM CHLORIDE 5; .45; .15 G/100ML; G/100ML; G/100ML
50 INJECTION INTRAVENOUS CONTINUOUS
Status: DISCONTINUED | OUTPATIENT
Start: 2018-04-21 | End: 2018-04-26 | Stop reason: HOSPADM

## 2018-04-21 RX ADMIN — CARVEDILOL 12.5 MG: 12.5 TABLET, FILM COATED ORAL at 08:40

## 2018-04-21 RX ADMIN — NIFEDIPINE 10 MG: 10 CAPSULE ORAL at 08:40

## 2018-04-21 RX ADMIN — FAMOTIDINE 20 MG: 20 TABLET ORAL at 08:40

## 2018-04-21 RX ADMIN — PROMETHAZINE HYDROCHLORIDE 12.5 MG: 25 INJECTION, SOLUTION INTRAMUSCULAR; INTRAVENOUS at 11:45

## 2018-04-21 RX ADMIN — TAMSULOSIN HYDROCHLORIDE 0.4 MG: 0.4 CAPSULE ORAL at 21:35

## 2018-04-21 RX ADMIN — INSULIN ASPART 4 UNITS: 100 INJECTION, SOLUTION INTRAVENOUS; SUBCUTANEOUS at 21:35

## 2018-04-21 RX ADMIN — HYDROCODONE BITARTRATE AND ACETAMINOPHEN 1 TABLET: 10; 325 TABLET ORAL at 09:20

## 2018-04-21 RX ADMIN — HYDROMORPHONE HYDROCHLORIDE 1 MG: 1 INJECTION, SOLUTION INTRAMUSCULAR; INTRAVENOUS; SUBCUTANEOUS at 21:35

## 2018-04-21 RX ADMIN — HYDROMORPHONE HYDROCHLORIDE 1 MG: 1 INJECTION, SOLUTION INTRAMUSCULAR; INTRAVENOUS; SUBCUTANEOUS at 14:37

## 2018-04-21 RX ADMIN — ASPIRIN 81 MG: 81 TABLET, COATED ORAL at 08:40

## 2018-04-21 RX ADMIN — POTASSIUM CHLORIDE, DEXTROSE MONOHYDRATE AND SODIUM CHLORIDE 100 ML/HR: 150; 5; 450 INJECTION, SOLUTION INTRAVENOUS at 14:37

## 2018-04-21 RX ADMIN — INSULIN ASPART 2 UNITS: 100 INJECTION, SOLUTION INTRAVENOUS; SUBCUTANEOUS at 17:19

## 2018-04-21 RX ADMIN — PROMETHAZINE HYDROCHLORIDE 12.5 MG: 25 INJECTION, SOLUTION INTRAMUSCULAR; INTRAVENOUS at 17:19

## 2018-04-21 RX ADMIN — FAMOTIDINE 20 MG: 20 TABLET ORAL at 21:35

## 2018-04-21 RX ADMIN — ENOXAPARIN SODIUM 40 MG: 40 INJECTION SUBCUTANEOUS at 08:40

## 2018-04-21 RX ADMIN — LISINOPRIL 10 MG: 10 TABLET ORAL at 08:40

## 2018-04-21 RX ADMIN — HYDROMORPHONE HYDROCHLORIDE 1 MG: 1 INJECTION, SOLUTION INTRAMUSCULAR; INTRAVENOUS; SUBCUTANEOUS at 04:57

## 2018-04-21 RX ADMIN — FINASTERIDE 5 MG: 5 TABLET, FILM COATED ORAL at 08:40

## 2018-04-22 LAB
GLUCOSE BLDC GLUCOMTR-MCNC: 164 MG/DL (ref 70–130)
GLUCOSE BLDC GLUCOMTR-MCNC: 179 MG/DL (ref 70–130)
GLUCOSE BLDC GLUCOMTR-MCNC: 193 MG/DL (ref 70–130)
GLUCOSE BLDC GLUCOMTR-MCNC: 214 MG/DL (ref 70–130)

## 2018-04-22 PROCEDURE — 25010000002 ONDANSETRON PER 1 MG: Performed by: SURGERY

## 2018-04-22 PROCEDURE — 82962 GLUCOSE BLOOD TEST: CPT

## 2018-04-22 PROCEDURE — 25010000002 ENOXAPARIN PER 10 MG: Performed by: SURGERY

## 2018-04-22 PROCEDURE — 25010000002 HYDROMORPHONE PER 4 MG: Performed by: SURGERY

## 2018-04-22 PROCEDURE — 63710000001 INSULIN ASPART PER 5 UNITS: Performed by: SURGERY

## 2018-04-22 RX ADMIN — HYDROMORPHONE HYDROCHLORIDE 1 MG: 1 INJECTION, SOLUTION INTRAMUSCULAR; INTRAVENOUS; SUBCUTANEOUS at 21:01

## 2018-04-22 RX ADMIN — POTASSIUM CHLORIDE, DEXTROSE MONOHYDRATE AND SODIUM CHLORIDE 100 ML/HR: 150; 5; 450 INJECTION, SOLUTION INTRAVENOUS at 09:45

## 2018-04-22 RX ADMIN — LISINOPRIL 10 MG: 10 TABLET ORAL at 09:41

## 2018-04-22 RX ADMIN — FAMOTIDINE 20 MG: 20 TABLET ORAL at 09:40

## 2018-04-22 RX ADMIN — TAMSULOSIN HYDROCHLORIDE 0.4 MG: 0.4 CAPSULE ORAL at 21:02

## 2018-04-22 RX ADMIN — FAMOTIDINE 20 MG: 20 TABLET ORAL at 21:02

## 2018-04-22 RX ADMIN — POTASSIUM CHLORIDE, DEXTROSE MONOHYDRATE AND SODIUM CHLORIDE 100 ML/HR: 150; 5; 450 INJECTION, SOLUTION INTRAVENOUS at 00:27

## 2018-04-22 RX ADMIN — HYDROMORPHONE HYDROCHLORIDE 1 MG: 1 INJECTION, SOLUTION INTRAMUSCULAR; INTRAVENOUS; SUBCUTANEOUS at 17:10

## 2018-04-22 RX ADMIN — CARVEDILOL 12.5 MG: 12.5 TABLET, FILM COATED ORAL at 09:41

## 2018-04-22 RX ADMIN — INSULIN ASPART 2 UNITS: 100 INJECTION, SOLUTION INTRAVENOUS; SUBCUTANEOUS at 12:32

## 2018-04-22 RX ADMIN — ENOXAPARIN SODIUM 40 MG: 40 INJECTION SUBCUTANEOUS at 09:40

## 2018-04-22 RX ADMIN — NIFEDIPINE 10 MG: 10 CAPSULE ORAL at 09:40

## 2018-04-22 RX ADMIN — POTASSIUM CHLORIDE, DEXTROSE MONOHYDRATE AND SODIUM CHLORIDE 100 ML/HR: 150; 5; 450 INJECTION, SOLUTION INTRAVENOUS at 21:01

## 2018-04-22 RX ADMIN — ONDANSETRON 4 MG: 2 INJECTION INTRAMUSCULAR; INTRAVENOUS at 07:24

## 2018-04-22 RX ADMIN — ASPIRIN 81 MG: 81 TABLET, COATED ORAL at 09:40

## 2018-04-22 RX ADMIN — INSULIN ASPART 2 UNITS: 100 INJECTION, SOLUTION INTRAVENOUS; SUBCUTANEOUS at 17:11

## 2018-04-22 RX ADMIN — INSULIN ASPART 2 UNITS: 100 INJECTION, SOLUTION INTRAVENOUS; SUBCUTANEOUS at 21:01

## 2018-04-22 RX ADMIN — HYDROMORPHONE HYDROCHLORIDE 1 MG: 1 INJECTION, SOLUTION INTRAMUSCULAR; INTRAVENOUS; SUBCUTANEOUS at 07:24

## 2018-04-22 RX ADMIN — INSULIN ASPART 4 UNITS: 100 INJECTION, SOLUTION INTRAVENOUS; SUBCUTANEOUS at 09:40

## 2018-04-22 RX ADMIN — CARVEDILOL 12.5 MG: 12.5 TABLET, FILM COATED ORAL at 17:11

## 2018-04-22 RX ADMIN — FINASTERIDE 5 MG: 5 TABLET, FILM COATED ORAL at 09:40

## 2018-04-23 LAB
GLUCOSE BLDC GLUCOMTR-MCNC: 188 MG/DL (ref 70–130)
GLUCOSE BLDC GLUCOMTR-MCNC: 224 MG/DL (ref 70–130)
GLUCOSE BLDC GLUCOMTR-MCNC: 229 MG/DL (ref 70–130)
GLUCOSE BLDC GLUCOMTR-MCNC: 259 MG/DL (ref 70–130)

## 2018-04-23 PROCEDURE — G8979 MOBILITY GOAL STATUS: HCPCS | Performed by: PHYSICAL THERAPIST

## 2018-04-23 PROCEDURE — 97530 THERAPEUTIC ACTIVITIES: CPT | Performed by: PHYSICAL THERAPIST

## 2018-04-23 PROCEDURE — 97162 PT EVAL MOD COMPLEX 30 MIN: CPT | Performed by: PHYSICAL THERAPIST

## 2018-04-23 PROCEDURE — G0378 HOSPITAL OBSERVATION PER HR: HCPCS

## 2018-04-23 PROCEDURE — G8978 MOBILITY CURRENT STATUS: HCPCS | Performed by: PHYSICAL THERAPIST

## 2018-04-23 PROCEDURE — 25010000002 PROMETHAZINE PER 50 MG: Performed by: SURGERY

## 2018-04-23 PROCEDURE — 63710000001 INSULIN ASPART PER 5 UNITS: Performed by: SURGERY

## 2018-04-23 PROCEDURE — 25010000002 ENOXAPARIN PER 10 MG: Performed by: SURGERY

## 2018-04-23 PROCEDURE — 25010000002 HYDROMORPHONE PER 4 MG: Performed by: SURGERY

## 2018-04-23 PROCEDURE — 82962 GLUCOSE BLOOD TEST: CPT

## 2018-04-23 PROCEDURE — 25010000002 ONDANSETRON PER 1 MG: Performed by: SURGERY

## 2018-04-23 PROCEDURE — 99024 POSTOP FOLLOW-UP VISIT: CPT | Performed by: SURGERY

## 2018-04-23 RX ORDER — CALCIUM CARBONATE 200(500)MG
1 TABLET,CHEWABLE ORAL 3 TIMES DAILY PRN
Status: DISCONTINUED | OUTPATIENT
Start: 2018-04-23 | End: 2018-04-26 | Stop reason: HOSPADM

## 2018-04-23 RX ORDER — HYDROCODONE BITARTRATE AND ACETAMINOPHEN 7.5; 325 MG/1; MG/1
1 TABLET ORAL EVERY 4 HOURS PRN
Status: DISCONTINUED | OUTPATIENT
Start: 2018-04-23 | End: 2018-04-26 | Stop reason: HOSPADM

## 2018-04-23 RX ADMIN — FAMOTIDINE 20 MG: 20 TABLET ORAL at 09:02

## 2018-04-23 RX ADMIN — HYDROMORPHONE HYDROCHLORIDE 1 MG: 1 INJECTION, SOLUTION INTRAMUSCULAR; INTRAVENOUS; SUBCUTANEOUS at 00:35

## 2018-04-23 RX ADMIN — HYDROMORPHONE HYDROCHLORIDE 1 MG: 1 INJECTION, SOLUTION INTRAMUSCULAR; INTRAVENOUS; SUBCUTANEOUS at 20:14

## 2018-04-23 RX ADMIN — ONDANSETRON 4 MG: 2 INJECTION INTRAMUSCULAR; INTRAVENOUS at 05:11

## 2018-04-23 RX ADMIN — PROMETHAZINE HYDROCHLORIDE 12.5 MG: 25 INJECTION, SOLUTION INTRAMUSCULAR; INTRAVENOUS at 20:16

## 2018-04-23 RX ADMIN — TAMSULOSIN HYDROCHLORIDE 0.4 MG: 0.4 CAPSULE ORAL at 20:11

## 2018-04-23 RX ADMIN — HYDROMORPHONE HYDROCHLORIDE 1 MG: 1 INJECTION, SOLUTION INTRAMUSCULAR; INTRAVENOUS; SUBCUTANEOUS at 14:39

## 2018-04-23 RX ADMIN — NIFEDIPINE 10 MG: 10 CAPSULE ORAL at 09:02

## 2018-04-23 RX ADMIN — INSULIN ASPART 4 UNITS: 100 INJECTION, SOLUTION INTRAVENOUS; SUBCUTANEOUS at 09:01

## 2018-04-23 RX ADMIN — PROMETHAZINE HYDROCHLORIDE 12.5 MG: 25 INJECTION, SOLUTION INTRAMUSCULAR; INTRAVENOUS at 06:42

## 2018-04-23 RX ADMIN — LISINOPRIL 10 MG: 10 TABLET ORAL at 09:02

## 2018-04-23 RX ADMIN — INSULIN ASPART 4 UNITS: 100 INJECTION, SOLUTION INTRAVENOUS; SUBCUTANEOUS at 17:32

## 2018-04-23 RX ADMIN — HYDROCODONE BITARTRATE AND ACETAMINOPHEN 1 TABLET: 7.5; 325 TABLET ORAL at 17:33

## 2018-04-23 RX ADMIN — INSULIN ASPART 6 UNITS: 100 INJECTION, SOLUTION INTRAVENOUS; SUBCUTANEOUS at 12:31

## 2018-04-23 RX ADMIN — ENOXAPARIN SODIUM 40 MG: 40 INJECTION SUBCUTANEOUS at 09:02

## 2018-04-23 RX ADMIN — ASPIRIN 81 MG: 81 TABLET, COATED ORAL at 09:02

## 2018-04-23 RX ADMIN — POTASSIUM CHLORIDE, DEXTROSE MONOHYDRATE AND SODIUM CHLORIDE 100 ML/HR: 150; 5; 450 INJECTION, SOLUTION INTRAVENOUS at 17:33

## 2018-04-23 RX ADMIN — FAMOTIDINE 20 MG: 20 TABLET ORAL at 20:12

## 2018-04-23 RX ADMIN — POTASSIUM CHLORIDE, DEXTROSE MONOHYDRATE AND SODIUM CHLORIDE 100 ML/HR: 150; 5; 450 INJECTION, SOLUTION INTRAVENOUS at 06:42

## 2018-04-23 RX ADMIN — FINASTERIDE 5 MG: 5 TABLET, FILM COATED ORAL at 09:02

## 2018-04-23 RX ADMIN — CARVEDILOL 12.5 MG: 12.5 TABLET, FILM COATED ORAL at 09:02

## 2018-04-23 RX ADMIN — INSULIN ASPART 2 UNITS: 100 INJECTION, SOLUTION INTRAVENOUS; SUBCUTANEOUS at 20:12

## 2018-04-23 RX ADMIN — CARVEDILOL 12.5 MG: 12.5 TABLET, FILM COATED ORAL at 17:33

## 2018-04-24 LAB
GLUCOSE BLDC GLUCOMTR-MCNC: 122 MG/DL (ref 70–130)
GLUCOSE BLDC GLUCOMTR-MCNC: 157 MG/DL (ref 70–130)
GLUCOSE BLDC GLUCOMTR-MCNC: 211 MG/DL (ref 70–130)
GLUCOSE BLDC GLUCOMTR-MCNC: 234 MG/DL (ref 70–130)

## 2018-04-24 PROCEDURE — 82962 GLUCOSE BLOOD TEST: CPT

## 2018-04-24 PROCEDURE — 97110 THERAPEUTIC EXERCISES: CPT

## 2018-04-24 PROCEDURE — G0378 HOSPITAL OBSERVATION PER HR: HCPCS

## 2018-04-24 PROCEDURE — G8988 SELF CARE GOAL STATUS: HCPCS

## 2018-04-24 PROCEDURE — 25010000002 ENOXAPARIN PER 10 MG: Performed by: SURGERY

## 2018-04-24 PROCEDURE — 99024 POSTOP FOLLOW-UP VISIT: CPT | Performed by: SURGERY

## 2018-04-24 PROCEDURE — 94799 UNLISTED PULMONARY SVC/PX: CPT

## 2018-04-24 PROCEDURE — 97530 THERAPEUTIC ACTIVITIES: CPT

## 2018-04-24 PROCEDURE — 63710000001 INSULIN ASPART PER 5 UNITS: Performed by: SURGERY

## 2018-04-24 PROCEDURE — 97166 OT EVAL MOD COMPLEX 45 MIN: CPT

## 2018-04-24 PROCEDURE — 97116 GAIT TRAINING THERAPY: CPT

## 2018-04-24 PROCEDURE — G8987 SELF CARE CURRENT STATUS: HCPCS

## 2018-04-24 RX ADMIN — LISINOPRIL 10 MG: 10 TABLET ORAL at 08:55

## 2018-04-24 RX ADMIN — INSULIN ASPART 4 UNITS: 100 INJECTION, SOLUTION INTRAVENOUS; SUBCUTANEOUS at 08:54

## 2018-04-24 RX ADMIN — HYDROCODONE BITARTRATE AND ACETAMINOPHEN 1 TABLET: 7.5; 325 TABLET ORAL at 23:49

## 2018-04-24 RX ADMIN — ASPIRIN 81 MG: 81 TABLET, COATED ORAL at 08:55

## 2018-04-24 RX ADMIN — POTASSIUM CHLORIDE, DEXTROSE MONOHYDRATE AND SODIUM CHLORIDE 100 ML/HR: 150; 5; 450 INJECTION, SOLUTION INTRAVENOUS at 03:49

## 2018-04-24 RX ADMIN — HYDROCODONE BITARTRATE AND ACETAMINOPHEN 1 TABLET: 7.5; 325 TABLET ORAL at 08:57

## 2018-04-24 RX ADMIN — FAMOTIDINE 20 MG: 20 TABLET ORAL at 20:23

## 2018-04-24 RX ADMIN — FAMOTIDINE 20 MG: 20 TABLET ORAL at 08:55

## 2018-04-24 RX ADMIN — CARVEDILOL 12.5 MG: 12.5 TABLET, FILM COATED ORAL at 08:55

## 2018-04-24 RX ADMIN — HYDROCODONE BITARTRATE AND ACETAMINOPHEN 1 TABLET: 7.5; 325 TABLET ORAL at 13:17

## 2018-04-24 RX ADMIN — NIFEDIPINE 10 MG: 10 CAPSULE ORAL at 08:54

## 2018-04-24 RX ADMIN — CARVEDILOL 12.5 MG: 12.5 TABLET, FILM COATED ORAL at 18:08

## 2018-04-24 RX ADMIN — INSULIN ASPART 2 UNITS: 100 INJECTION, SOLUTION INTRAVENOUS; SUBCUTANEOUS at 20:23

## 2018-04-24 RX ADMIN — FINASTERIDE 5 MG: 5 TABLET, FILM COATED ORAL at 08:54

## 2018-04-24 RX ADMIN — HYDROCODONE BITARTRATE AND ACETAMINOPHEN 1 TABLET: 7.5; 325 TABLET ORAL at 18:08

## 2018-04-24 RX ADMIN — ENOXAPARIN SODIUM 40 MG: 40 INJECTION SUBCUTANEOUS at 08:55

## 2018-04-24 RX ADMIN — TAMSULOSIN HYDROCHLORIDE 0.4 MG: 0.4 CAPSULE ORAL at 20:23

## 2018-04-24 RX ADMIN — INSULIN ASPART 4 UNITS: 100 INJECTION, SOLUTION INTRAVENOUS; SUBCUTANEOUS at 18:08

## 2018-04-24 RX ADMIN — HYDROCODONE BITARTRATE AND ACETAMINOPHEN 1 TABLET: 7.5; 325 TABLET ORAL at 04:45

## 2018-04-25 ENCOUNTER — ANESTHESIA EVENT (OUTPATIENT)
Dept: PERIOP | Facility: HOSPITAL | Age: 83
End: 2018-04-25

## 2018-04-25 ENCOUNTER — ANESTHESIA (OUTPATIENT)
Dept: PERIOP | Facility: HOSPITAL | Age: 83
End: 2018-04-25

## 2018-04-25 PROBLEM — R33.9 URINARY RETENTION: Status: ACTIVE | Noted: 2018-04-13

## 2018-04-25 LAB
GLUCOSE BLDC GLUCOMTR-MCNC: 203 MG/DL (ref 70–130)
GLUCOSE BLDC GLUCOMTR-MCNC: 214 MG/DL (ref 70–130)
GLUCOSE BLDC GLUCOMTR-MCNC: 235 MG/DL (ref 70–130)

## 2018-04-25 PROCEDURE — 97535 SELF CARE MNGMENT TRAINING: CPT

## 2018-04-25 PROCEDURE — 97116 GAIT TRAINING THERAPY: CPT

## 2018-04-25 PROCEDURE — 82962 GLUCOSE BLOOD TEST: CPT

## 2018-04-25 PROCEDURE — G0378 HOSPITAL OBSERVATION PER HR: HCPCS

## 2018-04-25 PROCEDURE — 25010000002 PROPOFOL 10 MG/ML EMULSION: Performed by: NURSE ANESTHETIST, CERTIFIED REGISTERED

## 2018-04-25 PROCEDURE — 25010000002 FENTANYL CITRATE (PF) 100 MCG/2ML SOLUTION: Performed by: NURSE ANESTHETIST, CERTIFIED REGISTERED

## 2018-04-25 PROCEDURE — 97530 THERAPEUTIC ACTIVITIES: CPT

## 2018-04-25 PROCEDURE — C1894 INTRO/SHEATH, NON-LASER: HCPCS | Performed by: UROLOGY

## 2018-04-25 PROCEDURE — 97110 THERAPEUTIC EXERCISES: CPT

## 2018-04-25 PROCEDURE — 63710000001 INSULIN ASPART PER 5 UNITS: Performed by: SURGERY

## 2018-04-25 PROCEDURE — 99024 POSTOP FOLLOW-UP VISIT: CPT | Performed by: SURGERY

## 2018-04-25 RX ORDER — LIDOCAINE HYDROCHLORIDE 10 MG/ML
INJECTION, SOLUTION INFILTRATION; PERINEURAL AS NEEDED
Status: DISCONTINUED | OUTPATIENT
Start: 2018-04-25 | End: 2018-04-25 | Stop reason: SURG

## 2018-04-25 RX ORDER — PROPOFOL 10 MG/ML
VIAL (ML) INTRAVENOUS AS NEEDED
Status: DISCONTINUED | OUTPATIENT
Start: 2018-04-25 | End: 2018-04-25 | Stop reason: SURG

## 2018-04-25 RX ORDER — FENTANYL CITRATE 50 UG/ML
INJECTION, SOLUTION INTRAMUSCULAR; INTRAVENOUS AS NEEDED
Status: DISCONTINUED | OUTPATIENT
Start: 2018-04-25 | End: 2018-04-25 | Stop reason: SURG

## 2018-04-25 RX ORDER — SODIUM CHLORIDE 9 MG/ML
INJECTION, SOLUTION INTRAVENOUS CONTINUOUS PRN
Status: DISCONTINUED | OUTPATIENT
Start: 2018-04-25 | End: 2018-04-25 | Stop reason: SURG

## 2018-04-25 RX ADMIN — TAMSULOSIN HYDROCHLORIDE 0.4 MG: 0.4 CAPSULE ORAL at 20:08

## 2018-04-25 RX ADMIN — FINASTERIDE 5 MG: 5 TABLET, FILM COATED ORAL at 08:27

## 2018-04-25 RX ADMIN — PROPOFOL 20 MG: 10 INJECTION, EMULSION INTRAVENOUS at 18:01

## 2018-04-25 RX ADMIN — ASPIRIN 81 MG: 81 TABLET, COATED ORAL at 08:27

## 2018-04-25 RX ADMIN — PROPOFOL 20 MG: 10 INJECTION, EMULSION INTRAVENOUS at 18:03

## 2018-04-25 RX ADMIN — FENTANYL CITRATE 50 MCG: 50 INJECTION, SOLUTION INTRAMUSCULAR; INTRAVENOUS at 18:00

## 2018-04-25 RX ADMIN — PROPOFOL 60 MG: 10 INJECTION, EMULSION INTRAVENOUS at 17:59

## 2018-04-25 RX ADMIN — POTASSIUM CHLORIDE, DEXTROSE MONOHYDRATE AND SODIUM CHLORIDE 50 ML/HR: 150; 5; 450 INJECTION, SOLUTION INTRAVENOUS at 20:13

## 2018-04-25 RX ADMIN — LIDOCAINE HYDROCHLORIDE 50 MG: 10 INJECTION, SOLUTION INFILTRATION; PERINEURAL at 17:58

## 2018-04-25 RX ADMIN — HYDROCODONE BITARTRATE AND ACETAMINOPHEN 1 TABLET: 7.5; 325 TABLET ORAL at 12:41

## 2018-04-25 RX ADMIN — INSULIN ASPART 4 UNITS: 100 INJECTION, SOLUTION INTRAVENOUS; SUBCUTANEOUS at 22:34

## 2018-04-25 RX ADMIN — LISINOPRIL 10 MG: 10 TABLET ORAL at 08:27

## 2018-04-25 RX ADMIN — INSULIN ASPART 4 UNITS: 100 INJECTION, SOLUTION INTRAVENOUS; SUBCUTANEOUS at 08:28

## 2018-04-25 RX ADMIN — FAMOTIDINE 20 MG: 20 TABLET ORAL at 08:29

## 2018-04-25 RX ADMIN — CARVEDILOL 12.5 MG: 12.5 TABLET, FILM COATED ORAL at 22:35

## 2018-04-25 RX ADMIN — CARVEDILOL 12.5 MG: 12.5 TABLET, FILM COATED ORAL at 08:27

## 2018-04-25 RX ADMIN — SODIUM CHLORIDE: 9 INJECTION, SOLUTION INTRAVENOUS at 17:53

## 2018-04-25 RX ADMIN — HYDROCODONE BITARTRATE AND ACETAMINOPHEN 1 TABLET: 7.5; 325 TABLET ORAL at 08:27

## 2018-04-25 RX ADMIN — FAMOTIDINE 20 MG: 20 TABLET ORAL at 20:08

## 2018-04-25 RX ADMIN — HYDROCODONE BITARTRATE AND ACETAMINOPHEN 1 TABLET: 7.5; 325 TABLET ORAL at 20:08

## 2018-04-25 RX ADMIN — NIFEDIPINE 10 MG: 10 CAPSULE ORAL at 08:27

## 2018-04-25 NOTE — ANESTHESIA POSTPROCEDURE EVALUATION
Patient: Eze Downing    Procedure Summary     Date:  04/25/18 Room / Location:  [facility] OR 02 / [facility] OR    Anesthesia Start:  1753 Anesthesia Stop:  1815    Procedure:  CYSTOSCOPY (N/A Urethra) Diagnosis:       Urinary retention      (Urinary retention [R33.9])    Surgeon:  Michael Forbes MD Provider:  Sasha Reina CRNA    Anesthesia Type:  MAC ASA Status:  3          Anesthesia Type: MAC  Last vitals  BP   157/74 (04/25/18 1631)   Temp   98.4 °F (36.9 °C) (04/25/18 1631)   Pulse   75 (04/25/18 1631)   Resp   18 (04/25/18 1631)     SpO2   96 % (04/25/18 1631)     Post Anesthesia Care and Evaluation    Patient location during evaluation: bedside  Patient participation: complete - patient participated  Level of consciousness: awake  Pain score: 0  Pain management: adequate  Airway patency: patent  Anesthetic complications: No anesthetic complications  PONV Status: none  Cardiovascular status: acceptable  Respiratory status: acceptable  Hydration status: acceptable

## 2018-04-25 NOTE — ANESTHESIA PREPROCEDURE EVALUATION
Anesthesia Evaluation     NPO Solid Status: > 8 hours  NPO Liquid Status: > 8 hours           Airway   Mallampati: I  TM distance: >3 FB  Neck ROM: full  No difficulty expected  Dental    (+) edentulous    Pulmonary    (+) a smoker Former, COPD mild, decreased breath sounds,   Cardiovascular   Exercise tolerance: poor (<4 METS)    ECG reviewed  Rhythm: regular  Rate: normal    (+) hypertension well controlled,       Neuro/Psych  (+) psychiatric history Anxiety, poor historian.,     GI/Hepatic/Renal/Endo    (+)   renal disease stones, diabetes mellitus type 2 poorly controlled,     Musculoskeletal     (+) arthralgias, back pain,   Abdominal     Abdomen: soft.   Substance History      OB/GYN          Other   (+) arthritis                     Anesthesia Plan    ASA 3     MAC     intravenous induction   Anesthetic plan and risks discussed with patient.

## 2018-04-26 VITALS
TEMPERATURE: 98.3 F | SYSTOLIC BLOOD PRESSURE: 117 MMHG | RESPIRATION RATE: 18 BRPM | WEIGHT: 148.59 LBS | DIASTOLIC BLOOD PRESSURE: 56 MMHG | BODY MASS INDEX: 25.37 KG/M2 | HEART RATE: 78 BPM | OXYGEN SATURATION: 96 % | HEIGHT: 64 IN

## 2018-04-26 LAB
GLUCOSE BLDC GLUCOMTR-MCNC: 191 MG/DL (ref 70–130)
GLUCOSE BLDC GLUCOMTR-MCNC: 227 MG/DL (ref 70–130)
GLUCOSE BLDC GLUCOMTR-MCNC: 292 MG/DL (ref 70–130)

## 2018-04-26 PROCEDURE — 25010000002 ENOXAPARIN PER 10 MG: Performed by: SURGERY

## 2018-04-26 PROCEDURE — G0378 HOSPITAL OBSERVATION PER HR: HCPCS

## 2018-04-26 PROCEDURE — 82962 GLUCOSE BLOOD TEST: CPT

## 2018-04-26 PROCEDURE — 97530 THERAPEUTIC ACTIVITIES: CPT

## 2018-04-26 PROCEDURE — 25010000002 PROMETHAZINE PER 50 MG: Performed by: SURGERY

## 2018-04-26 PROCEDURE — 63710000001 INSULIN ASPART PER 5 UNITS: Performed by: SURGERY

## 2018-04-26 PROCEDURE — 99024 POSTOP FOLLOW-UP VISIT: CPT | Performed by: SURGERY

## 2018-04-26 PROCEDURE — 97110 THERAPEUTIC EXERCISES: CPT

## 2018-04-26 RX ORDER — HYDROCODONE BITARTRATE AND ACETAMINOPHEN 10; 325 MG/1; MG/1
1 TABLET ORAL EVERY 6 HOURS PRN
Qty: 30 TABLET | Refills: 0 | Status: SHIPPED | OUTPATIENT
Start: 2018-04-26 | End: 2018-05-10 | Stop reason: HOSPADM

## 2018-04-26 RX ADMIN — ASPIRIN 81 MG: 81 TABLET, COATED ORAL at 09:21

## 2018-04-26 RX ADMIN — FINASTERIDE 5 MG: 5 TABLET, FILM COATED ORAL at 09:21

## 2018-04-26 RX ADMIN — INSULIN ASPART 2 UNITS: 100 INJECTION, SOLUTION INTRAVENOUS; SUBCUTANEOUS at 09:24

## 2018-04-26 RX ADMIN — LISINOPRIL 10 MG: 10 TABLET ORAL at 09:21

## 2018-04-26 RX ADMIN — HYDROCODONE BITARTRATE AND ACETAMINOPHEN 1 TABLET: 7.5; 325 TABLET ORAL at 13:44

## 2018-04-26 RX ADMIN — CARVEDILOL 12.5 MG: 12.5 TABLET, FILM COATED ORAL at 09:21

## 2018-04-26 RX ADMIN — ENOXAPARIN SODIUM 40 MG: 40 INJECTION SUBCUTANEOUS at 09:21

## 2018-04-26 RX ADMIN — INSULIN ASPART 4 UNITS: 100 INJECTION, SOLUTION INTRAVENOUS; SUBCUTANEOUS at 11:51

## 2018-04-26 RX ADMIN — NIFEDIPINE 10 MG: 10 CAPSULE ORAL at 09:21

## 2018-04-26 RX ADMIN — FAMOTIDINE 20 MG: 20 TABLET ORAL at 09:21

## 2018-04-26 RX ADMIN — HYDROCODONE BITARTRATE AND ACETAMINOPHEN 1 TABLET: 7.5; 325 TABLET ORAL at 00:37

## 2018-04-26 RX ADMIN — PROMETHAZINE HYDROCHLORIDE 12.5 MG: 25 INJECTION, SOLUTION INTRAMUSCULAR; INTRAVENOUS at 00:37

## 2018-05-04 ENCOUNTER — PREP FOR SURGERY (OUTPATIENT)
Dept: OTHER | Facility: HOSPITAL | Age: 83
End: 2018-05-04

## 2018-05-04 DIAGNOSIS — N13.8 ENLARGED PROSTATE WITH URINARY OBSTRUCTION: Primary | ICD-10-CM

## 2018-05-04 DIAGNOSIS — N40.1 ENLARGED PROSTATE WITH URINARY OBSTRUCTION: Primary | ICD-10-CM

## 2018-05-04 RX ORDER — LEVOFLOXACIN 5 MG/ML
500 INJECTION, SOLUTION INTRAVENOUS ONCE
Status: CANCELLED | OUTPATIENT
Start: 2018-05-09 | End: 2018-05-09

## 2018-05-07 ENCOUNTER — APPOINTMENT (OUTPATIENT)
Dept: PREADMISSION TESTING | Facility: HOSPITAL | Age: 83
End: 2018-05-07

## 2018-05-07 ENCOUNTER — OFFICE VISIT (OUTPATIENT)
Dept: SURGERY | Facility: CLINIC | Age: 83
End: 2018-05-07

## 2018-05-07 VITALS
WEIGHT: 144.6 LBS | BODY MASS INDEX: 24.69 KG/M2 | HEIGHT: 64 IN | DIASTOLIC BLOOD PRESSURE: 80 MMHG | SYSTOLIC BLOOD PRESSURE: 140 MMHG

## 2018-05-07 VITALS
RESPIRATION RATE: 14 BRPM | BODY MASS INDEX: 24.16 KG/M2 | HEART RATE: 98 BPM | WEIGHT: 145 LBS | SYSTOLIC BLOOD PRESSURE: 100 MMHG | HEIGHT: 65 IN | OXYGEN SATURATION: 97 % | DIASTOLIC BLOOD PRESSURE: 40 MMHG

## 2018-05-07 DIAGNOSIS — N40.1 ENLARGED PROSTATE WITH URINARY OBSTRUCTION: ICD-10-CM

## 2018-05-07 DIAGNOSIS — Z98.890 S/P REPAIR OF INGUINAL HERNIA: Primary | ICD-10-CM

## 2018-05-07 DIAGNOSIS — Z87.19 S/P REPAIR OF INGUINAL HERNIA: Primary | ICD-10-CM

## 2018-05-07 DIAGNOSIS — N13.8 ENLARGED PROSTATE WITH URINARY OBSTRUCTION: ICD-10-CM

## 2018-05-07 LAB
BILIRUB UR QL STRIP: NEGATIVE
CLARITY UR: CLEAR
COLOR UR: YELLOW
GLUCOSE UR STRIP-MCNC: NEGATIVE MG/DL
HGB UR QL STRIP.AUTO: ABNORMAL
INR PPP: 1.01 (ref 0.8–1.2)
KETONES UR QL STRIP: NEGATIVE
LEUKOCYTE ESTERASE UR QL STRIP.AUTO: ABNORMAL
NITRITE UR QL STRIP: NEGATIVE
PH UR STRIP.AUTO: 5.5 [PH] (ref 5–9)
PROT UR QL STRIP: ABNORMAL
PROTHROMBIN TIME: 13.1 SECONDS (ref 11.1–15.3)
SP GR UR STRIP: 1.01 (ref 1–1.03)
UROBILINOGEN UR QL STRIP: ABNORMAL

## 2018-05-07 PROCEDURE — 36415 COLL VENOUS BLD VENIPUNCTURE: CPT

## 2018-05-07 PROCEDURE — 99024 POSTOP FOLLOW-UP VISIT: CPT | Performed by: SURGERY

## 2018-05-07 PROCEDURE — 85610 PROTHROMBIN TIME: CPT | Performed by: UROLOGY

## 2018-05-07 PROCEDURE — 81003 URINALYSIS AUTO W/O SCOPE: CPT | Performed by: UROLOGY

## 2018-05-07 RX ORDER — SODIUM CHLORIDE 9 MG/ML
1000 INJECTION, SOLUTION INTRAVENOUS CONTINUOUS
Status: CANCELLED | OUTPATIENT
Start: 2018-05-09

## 2018-05-07 RX ORDER — CIPROFLOXACIN 250 MG/1
250 TABLET, FILM COATED ORAL EVERY 12 HOURS SCHEDULED
COMMUNITY
Start: 2018-05-04 | End: 2018-05-10 | Stop reason: HOSPADM

## 2018-05-07 NOTE — DISCHARGE SUMMARY
Discharge Summary  Date: 04/26/18  Service: General Surgery  Attending: Ramin Collado    Procedures: Open Repair of Left Inguinal Hernia, without mesh 4/19/18, Cystoscopy 4/25/18  Consults: Michael Forbes, Urology  Discharge Diagnoses: Left inguinal hernia, postoperative urinary retention due to BPH  Hospital Course: Admitted to the hospital following open left inguinal hernia repair with repair of colotomy. Had post operative urinary retention ultimately requiring urologic consultation.  He underwent cystoscopy and catheter placement.  He was unable to void during a voiding trial the following day and therefore the catheter was replaced with plans for later outpatient urology followup for removal.  Discharged home in stable condition with leg bag in place.    Discharge Diet: Regular.    Discharge Medications:     Your medication list      CHANGE how you take these medications      Instructions Last Dose Given Next Dose Due   HYDROcodone-acetaminophen  MG per tablet  Commonly known as:  NORCO  What changed:  reasons to take this      Take 1 tablet by mouth Every 6 (Six) Hours As Needed for Moderate Pain  or Severe Pain .          CONTINUE taking these medications      Instructions Last Dose Given Next Dose Due   albuterol 108 (90 Base) MCG/ACT inhaler  Commonly known as:  PROVENTIL HFA;VENTOLIN HFA      Inhale 2 puffs Every 6 (Six) Hours As Needed for Wheezing.       aspirin 81 MG EC tablet  Notes to patient:  4/27 8am      Take 81 mg by mouth Daily.       carvedilol 12.5 MG tablet  Commonly known as:  COREG  Notes to patient:  4/26 5pm      Take 12.5 mg by mouth 2 (Two) Times a Day With Meals.       finasteride 5 MG tablet  Commonly known as:  PROSCAR  Notes to patient:  4/27 8am      Take 5 mg by mouth Daily.       glipiZIDE 5 MG tablet  Commonly known as:  GLUCOTROL  Notes to patient:  4/26 5pm      Take 5 mg by mouth 2 (Two) Times a Day Before Meals.       lisinopril 10 MG tablet  Commonly known as:   ADITYA SALGADO  Notes to patient:  4/27 8am      Take 10 mg by mouth Daily.       metFORMIN 500 MG tablet  Commonly known as:  GLUCOPHAGE  Notes to patient:  4/26 4pm      Take 500 mg by mouth 2 (Two) Times a Day With Meals.       NIFEdipine 10 MG capsule  Commonly known as:  PROCARDIA  Notes to patient:  4/27 8am      Take 10 mg by mouth Daily.       ONE-A-DAY WITHIN PO  Notes to patient:  4/27 8am      Take 1 tablet by mouth Daily.       tamsulosin 0.4 MG capsule 24 hr capsule  Commonly known as:  FLOMAX  Notes to patient:  4/26 9pm      Take 1 capsule by mouth Every Night.             Where to Get Your Medications      You can get these medications from any pharmacy    Bring a paper prescription for each of these medications   HYDROcodone-acetaminophen  MG per tablet         Activity Instructions     Discharge Activity       1) No driving while taking narcotics.   2) May shower, no tub bath or submerging incisions.  3) Do not lift / push / pull more then 15 lbs.            Your Scheduled Appointments    May 07, 2018  9:30 AM CDT  Post-Op with Ramin Collado MD  [facility] GENERAL SURGERY (--) [address]  [facility] [place] [address] [place] [id number] [phone]   May 07, 2018  2:00 PM CDT  Pre-Admission Testing with HALIMA JOHNSON 1  [facility] PREADMISSION T ([address] [place] [address] [phone]    Additional instructions:      Dr. Vielka Ramirez Thursday May 3rd 10:30 AM    Dr. Forbes Friday May 4th 12:25                            This document has been electronically signed by Michael Mathew MD on May 7, 2018 7:49 AM

## 2018-05-07 NOTE — DISCHARGE INSTRUCTIONS
[facility]  Pre-op Information and Guidelines    You will be called after 2 p.m. the day before your surgery (Friday for Monday surgery) and notified of your time for arrival and approximate surgery time.  If you have not received a call by 4P.M., please contact Same Day Surgery at [phone] of if outside [facility] call [phone].    Please Follow these Important Safety Guidelines:    • The morning of your procedure, take only the medications listed below with   A sip of water:_____________________________________________       __INHALER, CARVEDILOL, NIFEDIPINE___________    • DO NOT eat or drink anything after 12:00 midnight the night before surgery  Specific instructions concerning drinking clear liquids will be discussed during  the pre-surgery instruction call the day before your surgery.    • If you take a blood thinner (ex. Plavix, Coumadin, aspirin), ask your doctor when to stop it before surgery  STOP DATE: _________________    • Only 2 visitors are allowed in patient rooms at a time  Your visitors will be asked to wait in the lobby until the admission process is complete with the exception of a parent with a child and patients in need of special assistance.    • YOU CANNOT DRIVE YOURSELF HOME  You must be accompanied by someone who will be responsible for driving you home after surgery and for your care at home.    • DO NOT chew gum, use breath mints, hard candy, or smoke the day of surgery  • DO NOT drink alcohol for at least 24 hours before your surgery  • DO NOT wear any jewelry and remove all body piercing before coming to the hospital  • DO NOT wear make-up to the hospital  • If you are having surgery on an extremity (arm/leg/foot) remove nail polish/artificial nails on the surgical side  • Clothing, glasses, contacts, dentures, and hairpieces must be removed before surgery  • Bathe the night before or the morning of your surgery and do not use powders/lotions on  skin.

## 2018-05-09 ENCOUNTER — HOSPITAL ENCOUNTER (OUTPATIENT)
Facility: HOSPITAL | Age: 83
Discharge: HOME | End: 2018-05-10
Attending: UROLOGY | Admitting: UROLOGY

## 2018-05-09 ENCOUNTER — ANESTHESIA EVENT (OUTPATIENT)
Dept: PERIOP | Facility: HOSPITAL | Age: 83
End: 2018-05-09

## 2018-05-09 ENCOUNTER — ANESTHESIA (OUTPATIENT)
Dept: PERIOP | Facility: HOSPITAL | Age: 83
End: 2018-05-09

## 2018-05-09 DIAGNOSIS — N40.1 ENLARGED PROSTATE WITH URINARY OBSTRUCTION: ICD-10-CM

## 2018-05-09 DIAGNOSIS — N13.8 ENLARGED PROSTATE WITH URINARY OBSTRUCTION: ICD-10-CM

## 2018-05-09 PROBLEM — N40.0 BPH (BENIGN PROSTATIC HYPERPLASIA): Status: ACTIVE | Noted: 2018-05-09

## 2018-05-09 LAB
ANION GAP SERPL CALCULATED.3IONS-SCNC: 11 MMOL/L (ref 5–15)
BASOPHILS # BLD AUTO: 0.05 10*3/MM3 (ref 0–0.2)
BASOPHILS NFR BLD AUTO: 0.7 % (ref 0–2)
BUN BLD-MCNC: 15 MG/DL (ref 7–21)
BUN/CREAT SERPL: 22.4 (ref 7–25)
CALCIUM SPEC-SCNC: 9.6 MG/DL (ref 8.4–10.2)
CHLORIDE SERPL-SCNC: 99 MMOL/L (ref 95–110)
CO2 SERPL-SCNC: 27 MMOL/L (ref 22–31)
CREAT BLD-MCNC: 0.67 MG/DL (ref 0.7–1.3)
DEPRECATED RDW RBC AUTO: 38.5 FL (ref 35.1–43.9)
EOSINOPHIL # BLD AUTO: 0.49 10*3/MM3 (ref 0–0.7)
EOSINOPHIL NFR BLD AUTO: 6.6 % (ref 0–7)
ERYTHROCYTE [DISTWIDTH] IN BLOOD BY AUTOMATED COUNT: 12.7 % (ref 11.5–14.5)
GFR SERPL CREATININE-BSD FRML MDRD: 114 ML/MIN/1.73 (ref 42–98)
GLUCOSE BLD-MCNC: 123 MG/DL (ref 60–100)
GLUCOSE BLDC GLUCOMTR-MCNC: 134 MG/DL (ref 70–130)
HCT VFR BLD AUTO: 31.6 % (ref 39–49)
HGB BLD-MCNC: 10.8 G/DL (ref 13.7–17.3)
IMM GRANULOCYTES # BLD: 0.02 10*3/MM3 (ref 0–0.02)
IMM GRANULOCYTES NFR BLD: 0.3 % (ref 0–0.5)
LYMPHOCYTES # BLD AUTO: 3.2 10*3/MM3 (ref 0.6–4.2)
LYMPHOCYTES NFR BLD AUTO: 42.8 % (ref 10–50)
MCH RBC QN AUTO: 28.6 PG (ref 26.5–34)
MCHC RBC AUTO-ENTMCNC: 34.2 G/DL (ref 31.5–36.3)
MCV RBC AUTO: 83.6 FL (ref 80–98)
MONOCYTES # BLD AUTO: 0.53 10*3/MM3 (ref 0–0.9)
MONOCYTES NFR BLD AUTO: 7.1 % (ref 0–12)
NEUTROPHILS # BLD AUTO: 3.18 10*3/MM3 (ref 2–8.6)
NEUTROPHILS NFR BLD AUTO: 42.5 % (ref 37–80)
PLATELET # BLD AUTO: 243 10*3/MM3 (ref 150–450)
PMV BLD AUTO: 11 FL (ref 8–12)
POTASSIUM BLD-SCNC: 4.9 MMOL/L (ref 3.5–5.1)
RBC # BLD AUTO: 3.78 10*6/MM3 (ref 4.37–5.74)
SODIUM BLD-SCNC: 137 MMOL/L (ref 137–145)
WBC NRBC COR # BLD: 7.47 10*3/MM3 (ref 3.2–9.8)

## 2018-05-09 PROCEDURE — 25010000002 LEVOFLOXACIN PER 250 MG: Performed by: UROLOGY

## 2018-05-09 PROCEDURE — 25010000002 GENTAMICIN PER 80 MG: Performed by: UROLOGY

## 2018-05-09 PROCEDURE — 82962 GLUCOSE BLOOD TEST: CPT

## 2018-05-09 PROCEDURE — 88305 TISSUE EXAM BY PATHOLOGIST: CPT | Performed by: UROLOGY

## 2018-05-09 PROCEDURE — 25010000002 HYDROMORPHONE PER 4 MG: Performed by: NURSE ANESTHETIST, CERTIFIED REGISTERED

## 2018-05-09 PROCEDURE — 85025 COMPLETE CBC W/AUTO DIFF WBC: CPT | Performed by: UROLOGY

## 2018-05-09 PROCEDURE — 25010000002 FENTANYL CITRATE (PF) 100 MCG/2ML SOLUTION: Performed by: NURSE ANESTHETIST, CERTIFIED REGISTERED

## 2018-05-09 PROCEDURE — 25010000002 PROPOFOL 10 MG/ML EMULSION: Performed by: NURSE ANESTHETIST, CERTIFIED REGISTERED

## 2018-05-09 PROCEDURE — 80048 BASIC METABOLIC PNL TOTAL CA: CPT | Performed by: UROLOGY

## 2018-05-09 PROCEDURE — C1894 INTRO/SHEATH, NON-LASER: HCPCS | Performed by: UROLOGY

## 2018-05-09 PROCEDURE — 88305 TISSUE EXAM BY PATHOLOGIST: CPT | Performed by: PATHOLOGY

## 2018-05-09 RX ORDER — LISINOPRIL 10 MG/1
10 TABLET ORAL DAILY
Status: DISCONTINUED | OUTPATIENT
Start: 2018-05-09 | End: 2018-05-10 | Stop reason: HOSPADM

## 2018-05-09 RX ORDER — MEPERIDINE HYDROCHLORIDE 50 MG/ML
12.5 INJECTION INTRAMUSCULAR; INTRAVENOUS; SUBCUTANEOUS
Status: DISCONTINUED | OUTPATIENT
Start: 2018-05-09 | End: 2018-05-09 | Stop reason: HOSPADM

## 2018-05-09 RX ORDER — FINASTERIDE 5 MG/1
5 TABLET, FILM COATED ORAL DAILY
Status: DISCONTINUED | OUTPATIENT
Start: 2018-05-09 | End: 2018-05-10 | Stop reason: HOSPADM

## 2018-05-09 RX ORDER — TAMSULOSIN HYDROCHLORIDE 0.4 MG/1
0.4 CAPSULE ORAL NIGHTLY
Status: DISCONTINUED | OUTPATIENT
Start: 2018-05-09 | End: 2018-05-10 | Stop reason: HOSPADM

## 2018-05-09 RX ORDER — FENTANYL CITRATE 50 UG/ML
INJECTION, SOLUTION INTRAMUSCULAR; INTRAVENOUS AS NEEDED
Status: DISCONTINUED | OUTPATIENT
Start: 2018-05-09 | End: 2018-05-09 | Stop reason: SURG

## 2018-05-09 RX ORDER — NALOXONE HCL 0.4 MG/ML
0.2 VIAL (ML) INJECTION AS NEEDED
Status: DISCONTINUED | OUTPATIENT
Start: 2018-05-09 | End: 2018-05-09 | Stop reason: HOSPADM

## 2018-05-09 RX ORDER — SODIUM CHLORIDE 9 MG/ML
1000 INJECTION, SOLUTION INTRAVENOUS CONTINUOUS
Status: DISCONTINUED | OUTPATIENT
Start: 2018-05-09 | End: 2018-05-10 | Stop reason: HOSPADM

## 2018-05-09 RX ORDER — PROMETHAZINE HYDROCHLORIDE 25 MG/ML
12.5 INJECTION, SOLUTION INTRAMUSCULAR; INTRAVENOUS EVERY 6 HOURS PRN
Status: DISCONTINUED | OUTPATIENT
Start: 2018-05-09 | End: 2018-05-10 | Stop reason: HOSPADM

## 2018-05-09 RX ORDER — ASPIRIN 81 MG/1
81 TABLET ORAL DAILY
Status: DISCONTINUED | OUTPATIENT
Start: 2018-05-09 | End: 2018-05-10

## 2018-05-09 RX ORDER — EPHEDRINE SULFATE 50 MG/ML
5 INJECTION, SOLUTION INTRAVENOUS ONCE AS NEEDED
Status: DISCONTINUED | OUTPATIENT
Start: 2018-05-09 | End: 2018-05-09 | Stop reason: HOSPADM

## 2018-05-09 RX ORDER — ACETAMINOPHEN 325 MG/1
650 TABLET ORAL ONCE AS NEEDED
Status: DISCONTINUED | OUTPATIENT
Start: 2018-05-09 | End: 2018-05-09 | Stop reason: HOSPADM

## 2018-05-09 RX ORDER — DIPHENHYDRAMINE HYDROCHLORIDE 50 MG/ML
12.5 INJECTION INTRAMUSCULAR; INTRAVENOUS
Status: DISCONTINUED | OUTPATIENT
Start: 2018-05-09 | End: 2018-05-09 | Stop reason: HOSPADM

## 2018-05-09 RX ORDER — GLIPIZIDE 5 MG/1
5 TABLET ORAL
Status: DISCONTINUED | OUTPATIENT
Start: 2018-05-10 | End: 2018-05-10 | Stop reason: HOSPADM

## 2018-05-09 RX ORDER — ALBUTEROL SULFATE 2.5 MG/3ML
2.5 SOLUTION RESPIRATORY (INHALATION) EVERY 6 HOURS PRN
Status: DISCONTINUED | OUTPATIENT
Start: 2018-05-09 | End: 2018-05-10 | Stop reason: HOSPADM

## 2018-05-09 RX ORDER — NIFEDIPINE 10 MG/1
10 CAPSULE ORAL DAILY
Status: DISCONTINUED | OUTPATIENT
Start: 2018-05-09 | End: 2018-05-10 | Stop reason: HOSPADM

## 2018-05-09 RX ORDER — HYDROCODONE BITARTRATE AND ACETAMINOPHEN 10; 325 MG/1; MG/1
1 TABLET ORAL EVERY 6 HOURS PRN
Status: DISCONTINUED | OUTPATIENT
Start: 2018-05-09 | End: 2018-05-10 | Stop reason: HOSPADM

## 2018-05-09 RX ORDER — NALOXONE HCL 0.4 MG/ML
0.4 VIAL (ML) INJECTION
Status: DISCONTINUED | OUTPATIENT
Start: 2018-05-09 | End: 2018-05-10 | Stop reason: HOSPADM

## 2018-05-09 RX ORDER — FLUMAZENIL 0.1 MG/ML
0.2 INJECTION INTRAVENOUS AS NEEDED
Status: DISCONTINUED | OUTPATIENT
Start: 2018-05-09 | End: 2018-05-09 | Stop reason: HOSPADM

## 2018-05-09 RX ORDER — LABETALOL HYDROCHLORIDE 5 MG/ML
5 INJECTION, SOLUTION INTRAVENOUS
Status: DISCONTINUED | OUTPATIENT
Start: 2018-05-09 | End: 2018-05-09 | Stop reason: HOSPADM

## 2018-05-09 RX ORDER — ACETAMINOPHEN 650 MG/1
650 SUPPOSITORY RECTAL ONCE AS NEEDED
Status: DISCONTINUED | OUTPATIENT
Start: 2018-05-09 | End: 2018-05-09 | Stop reason: HOSPADM

## 2018-05-09 RX ORDER — LEVOFLOXACIN 5 MG/ML
500 INJECTION, SOLUTION INTRAVENOUS ONCE
Status: COMPLETED | OUTPATIENT
Start: 2018-05-09 | End: 2018-05-09

## 2018-05-09 RX ORDER — PROPOFOL 10 MG/ML
VIAL (ML) INTRAVENOUS AS NEEDED
Status: DISCONTINUED | OUTPATIENT
Start: 2018-05-09 | End: 2018-05-09 | Stop reason: SURG

## 2018-05-09 RX ORDER — CARVEDILOL 12.5 MG/1
12.5 TABLET ORAL 2 TIMES DAILY WITH MEALS
Status: DISCONTINUED | OUTPATIENT
Start: 2018-05-09 | End: 2018-05-10 | Stop reason: HOSPADM

## 2018-05-09 RX ADMIN — SODIUM CHLORIDE 1000 ML: 900 INJECTION, SOLUTION INTRAVENOUS at 20:18

## 2018-05-09 RX ADMIN — ASPIRIN 81 MG: 81 TABLET, COATED ORAL at 21:50

## 2018-05-09 RX ADMIN — FINASTERIDE 5 MG: 5 TABLET, FILM COATED ORAL at 21:50

## 2018-05-09 RX ADMIN — METFORMIN HYDROCHLORIDE 500 MG: 500 TABLET ORAL at 21:49

## 2018-05-09 RX ADMIN — LEVOFLOXACIN 500 MG: 5 INJECTION, SOLUTION INTRAVENOUS at 17:31

## 2018-05-09 RX ADMIN — GENTAMICIN SULFATE 80 MG: 40 INJECTION, SOLUTION INTRAMUSCULAR; INTRAVENOUS at 18:53

## 2018-05-09 RX ADMIN — NIFEDIPINE 10 MG: 10 CAPSULE ORAL at 21:50

## 2018-05-09 RX ADMIN — FENTANYL CITRATE 25 MCG: 50 INJECTION, SOLUTION INTRAMUSCULAR; INTRAVENOUS at 17:38

## 2018-05-09 RX ADMIN — PROPOFOL 80 MG: 10 INJECTION, EMULSION INTRAVENOUS at 17:32

## 2018-05-09 RX ADMIN — ATROPA BELLADONNA AND OPIUM 30 MG: 16.2; 3 SUPPOSITORY RECTAL at 18:49

## 2018-05-09 RX ADMIN — HYDROCODONE BITARTRATE AND ACETAMINOPHEN 1 TABLET: 10; 325 TABLET ORAL at 20:14

## 2018-05-09 RX ADMIN — LISINOPRIL 10 MG: 10 TABLET ORAL at 21:49

## 2018-05-09 RX ADMIN — HYDROMORPHONE HYDROCHLORIDE 0.5 MG: 1 INJECTION, SOLUTION INTRAMUSCULAR; INTRAVENOUS; SUBCUTANEOUS at 18:38

## 2018-05-09 RX ADMIN — TAMSULOSIN HYDROCHLORIDE 0.4 MG: 0.4 CAPSULE ORAL at 21:50

## 2018-05-09 RX ADMIN — CARVEDILOL 12.5 MG: 12.5 TABLET, FILM COATED ORAL at 21:50

## 2018-05-09 RX ADMIN — SODIUM CHLORIDE 1000 ML: 900 INJECTION, SOLUTION INTRAVENOUS at 16:01

## 2018-05-09 RX ADMIN — SODIUM CHLORIDE: 900 INJECTION, SOLUTION INTRAVENOUS at 17:25

## 2018-05-09 NOTE — ANESTHESIA PREPROCEDURE EVALUATION
Anesthesia Evaluation     NPO Solid Status: > 8 hours  NPO Liquid Status: > 8 hours           Airway   Mallampati: I  TM distance: >3 FB  Neck ROM: full  No difficulty expected  Dental    (+) edentulous    Pulmonary     breath sounds clear to auscultation  (+) a smoker Former, COPD mild, recent URI resolved, decreased breath sounds,   Cardiovascular   Exercise tolerance: poor (<4 METS)    NYHA Classification: III  ECG reviewed  PT is on anticoagulation therapy  Rhythm: regular  Rate: normal    (+) hypertension well controlled, dysrhythmias PAC, hyperlipidemia,       Neuro/Psych  (+) weakness, numbness, psychiatric history Anxiety, poor historian.,     GI/Hepatic/Renal/Endo    (+)  GERD well controlled,  renal disease stones, diabetes mellitus type 2 poorly controlled,     Musculoskeletal     (+) arthralgias, back pain, myalgias,   Abdominal     Abdomen: soft.   Substance History      OB/GYN          Other   (+) arthritis                       Anesthesia Plan    ASA 4     general     intravenous induction   Anesthetic plan and risks discussed with patient.

## 2018-05-09 NOTE — OP NOTE
CYSTOSCOPY TRANSURETHRAL RESECTION OF PROSTATE  Procedure Note    Eze Downing  5/9/2018    Pre-op Diagnosis:   Enlarged prostate with urinary obstruction [N40.1, N13.8]    Post-op Diagnosis:     Post-Op Diagnosis Codes:     * Enlarged prostate with urinary obstruction [N40.1, N13.8]      Procedure(s):  CYSTOSCOPY TRANSURETHRAL RESECTION OF PROSTATE; PLACEMENT SUPRAPUBIC CATHETER    Surgeon(s):  Michael Forbes MD    Anesthesia: General    Staff:   Circulator: Raven Earl RN  Scrub Person: Radha Irizarry V  Assistant: Keisha Omalley CSA    Estimated Blood Loss: minimal    Specimens:                ID Type Source Tests Collected by Time   A : PROSTATE CHIPS Tissue Prostate TISSUE PATHOLOGY EXAM Michael Forbes MD 5/9/2018 1806         Drains:      Findings: Obstructing prostate    Complications: None    Indications: Urinary retention    Description of Procedure: Patient brought to cystoscopy place in dorsolithotomy position.  I placed a 22 Algerian cystoscope scope into the bladder trilobar obstructing prostate was noted.  I feel the bladder to capacity and made incision in the suprapubic region placed a 16 Algerian suprapubic tube in the dome of the bladder working sheath was removed and 16 Algerian Porter catheter was placed in the dome 10 cc placed in balloon I switched to a 24 resectoscope sheath and loop with a prior setting 300 and 100 respectively cutting coagulation.  I painted the prostate fossa with the rollerball and resected the tissue with the resectoscope loop evacuated the chips in the bladder cauterize all bleeders checked all landmarks right left ureters external sphincter verumontanum all intact.  Placed a 24 three-way Porter back in the bladder 50 cc placed in balloon.  This is placed on traction three-way irrigation initiated patient taken recovery.    Michael Forbes MD     Date: 5/9/2018  Time: 6:09 PM

## 2018-05-09 NOTE — H&P
[facility] History and Physical  ДМИТРИЙ VALDSE  82-year-old; :1935;;male  [address];[place] [address]  Ins: 1) MEDICARE  MRN:[id number]  RUDDY MEJIA MD  [facility] [place]  May. 2, 2018 12:03 PM  Allergies  Zofran Unknown  Current Medications  glipiZIDE 5 mg oral tablet  tamsulosin 0.4 mg oral capsule  carvedilol 12.5 mg oral tablet  NIFEdipine 10 mg oral capsule  metFORMIN 500 mg oral tablet  lisinopril 10 mg oral tablet  finasteride 5 mg oral tablet  Norco 10 mg-325 mg oral tablet  Aspir-Low 81 mg oral delayed release tablet  albuterol 90 mcg/inh inhalation aerosol  * Medications Reconciled  Medical History  Diabetes mellitus  Essential hypertension  Has not had colonoscopy.  Surgical History  HERNIA REPAIR  APPENDIX  Psychiatric History  Patient is generally satisfied with life. No  depression, anxiety or thoughts of suicide.  Family History  Heart disease  Diabetes mellitus  Essential hypertension  Mother  Father  Brother   Brother Alive Sister Alive Sister Alive  Social History  Former smoker. Non drinker. Unemployed.  .  Imm/Inj/Office Meds History Comments  Patient has had influenza vaccination for this  season.  Patient has had pneumococcal vaccination.  Chief Complaint  BPH  History of Present Illness  ДМИТРИЙ VALDES is a 82-year-old male here for evaluation. He has a history of BPH  with urinary retention. He has mckeon draining to diaper. Had cystoscopy at hospital  showing obstruction.  Location: [place].  Severity: Moderate.  Onset / Duration: 4-6 weeks.  Modifying factors: [place]ke[place].  Associated signs and symptoms: No fever.  Review of Systems  Eyes: ; Denies: blurry vision, pain in the eyes and double vision  ENMT: ; Denies: ear pain, sore throat and sinus problems  Cardiovascular: ; Denies: chest pain, varicose veins, angina and syncope  Respiratory: ; Denies: shortness of breath, wheezing and frequent  cough  Gastrointestinal: Reports: heartburn; Denies: abdominal pain, nausea, vomiting and  indigestion  Genitourinary: Reports: other symptoms (see HPI)  Musculoskeletal: Reports: joint pain and back pain; Denies: neck pain  Integumentary: ; Denies: skin rash, boils and persistent itch  Neurological: ; Denies: tremors, dizzy spells and numbness / tingling  Psychiatric: Reports: generally satisfied with life; Denies: depression, suicidal  ideation and anxiety  Endocrine: ; Denies: Excessive thirst, cold intolerance, heat intolerance and  tired/sluggish  Hematologic/Lymphatic: ; Denies: swollen glands and blood clotting problem  Allergic/Immunologic: ; Denies: hayfever  Constitutional: ; Denies: fever, chills and weight loss  Vital Signs  5/2/2018 12:03:00 PM  BP: 108/67 (mmHg) Heart Rate: 74 (/min)  Weight: 150 (lb) Resp Rate: 18 (/min)  Height: 65 (in) BMI: 24.96  Never smoker  Exam  General appearance: The patient appears well developed and well nourished, in no  apparent acute distress.  Examination of pupils and irises: No redness or drainage noted.  Assessment of hearing: Responds to verbal command.  Examination of neck: Neck appears supple. No masses noted.  Assessment of respiratory effort: Respiratory effort appears normal. No apparent  distress.  Genitourinary: Porter attached to leg bag.  Head and neck (Assessment of stability): Walks with use of cane.  Inspection of skin and subcutaneous tissue: Exam of the skin is within normal limits.  The color and skin turgor are normal.  No lesions, bruises, rashes, or jaundice.  Orientation to time, place and person: Oriented to person, place, and time.  Mood and affect: Normal mood and affect.  Data Review  Medications and chart reviewed. History and physical form/ROS reviewed and new  form completed today.  Assessment - Benign prostatic hypertrophy with outflow obstruction  DX:  Benign prostatic hypertrophy with outflow obstruction  SNO: [id number], ICD-9: 600.01,  ICD-10: N40.1, ICD-10: N13.8  Retention of urine  SNO: [id number], ICD-9: 788.20, ICD-10: R33.9  Plan  Plan Notes:  Cystoscopy, transurethral resection prostate with placement suprapubic catheter.  Porter put to leg bag.   Education:  Porter Catheter Care, Adult - English  Discussion:  Discussed my findings and plan of action and reasoning behind decision making. All  questions were answered. RISKS AND BENEFITS OF PROCEDURE DISCUSSED WITH PATIENT AND FAMILY WHO WISH TO PROCEED.  Instructions:  Patient is instructed to call with any problems. Patient is instructed to call if the  condition worsens. Patient is instructed to call with any changes in condition.

## 2018-05-09 NOTE — ANESTHESIA POSTPROCEDURE EVALUATION
Patient: Eze Downing    Procedure Summary     Date:  05/09/18 Room / Location:  [facility] OR 08 /  [facility]    Anesthesia Start:  1729 Anesthesia Stop:  1820    Procedure:  CYSTOSCOPY TRANSURETHRAL RESECTION OF PROSTATE; PLACEMENT SUPRAPUBIC CATHETER (N/A ) Diagnosis:       Enlarged prostate with urinary obstruction      (Enlarged prostate with urinary obstruction [N40.1, N13.8])    Surgeon:  Michael Forbes MD Provider:  Arline Bernard CRNA    Anesthesia Type:  general ASA Status:  4          Anesthesia Type: general  Last vitals  BP   148/70 (05/09/18 1551)   Temp   98.7 °F (37.1 °C) (05/09/18 1551)   Pulse   72 (05/09/18 1551)   Resp   20 (05/09/18 1551)     SpO2   96 % (05/09/18 1551)     Post Anesthesia Care and Evaluation    Patient location during evaluation: PACU  Patient participation: complete - patient participated  Level of consciousness: awake and alert  Pain management: adequate  Airway patency: patent  Anesthetic complications: No anesthetic complications    Cardiovascular status: acceptable  Respiratory status: acceptable  Hydration status: acceptable

## 2018-05-09 NOTE — ADDENDUM NOTE
Addendum  created 05/09/18 1825 by Popeye Washington MD    Anesthesia Attestations filed, Anesthesia Staff edited

## 2018-05-10 VITALS
OXYGEN SATURATION: 96 % | TEMPERATURE: 96.5 F | DIASTOLIC BLOOD PRESSURE: 63 MMHG | SYSTOLIC BLOOD PRESSURE: 136 MMHG | BODY MASS INDEX: 23.76 KG/M2 | HEIGHT: 65 IN | RESPIRATION RATE: 16 BRPM | WEIGHT: 142.64 LBS | HEART RATE: 74 BPM

## 2018-05-10 LAB
ANION GAP SERPL CALCULATED.3IONS-SCNC: 8 MMOL/L (ref 5–15)
BASOPHILS # BLD AUTO: 0.04 10*3/MM3 (ref 0–0.2)
BASOPHILS NFR BLD AUTO: 0.4 % (ref 0–2)
BUN BLD-MCNC: 13 MG/DL (ref 7–21)
BUN/CREAT SERPL: 18.3 (ref 7–25)
CALCIUM SPEC-SCNC: 9.6 MG/DL (ref 8.4–10.2)
CHLORIDE SERPL-SCNC: 99 MMOL/L (ref 95–110)
CO2 SERPL-SCNC: 29 MMOL/L (ref 22–31)
CREAT BLD-MCNC: 0.71 MG/DL (ref 0.7–1.3)
DEPRECATED RDW RBC AUTO: 39.2 FL (ref 35.1–43.9)
EOSINOPHIL # BLD AUTO: 0.46 10*3/MM3 (ref 0–0.7)
EOSINOPHIL NFR BLD AUTO: 5 % (ref 0–7)
ERYTHROCYTE [DISTWIDTH] IN BLOOD BY AUTOMATED COUNT: 12.7 % (ref 11.5–14.5)
GFR SERPL CREATININE-BSD FRML MDRD: 107 ML/MIN/1.73 (ref 60–98)
GLUCOSE BLD-MCNC: 125 MG/DL (ref 60–100)
HCT VFR BLD AUTO: 30.9 % (ref 39–49)
HGB BLD-MCNC: 10.4 G/DL (ref 13.7–17.3)
IMM GRANULOCYTES # BLD: 0.02 10*3/MM3 (ref 0–0.02)
IMM GRANULOCYTES NFR BLD: 0.2 % (ref 0–0.5)
LYMPHOCYTES # BLD AUTO: 2.5 10*3/MM3 (ref 0.6–4.2)
LYMPHOCYTES NFR BLD AUTO: 27 % (ref 10–50)
MCH RBC QN AUTO: 28.7 PG (ref 26.5–34)
MCHC RBC AUTO-ENTMCNC: 33.7 G/DL (ref 31.5–36.3)
MCV RBC AUTO: 85.1 FL (ref 80–98)
MONOCYTES # BLD AUTO: 0.78 10*3/MM3 (ref 0–0.9)
MONOCYTES NFR BLD AUTO: 8.4 % (ref 0–12)
NEUTROPHILS # BLD AUTO: 5.47 10*3/MM3 (ref 2–8.6)
NEUTROPHILS NFR BLD AUTO: 59 % (ref 37–80)
PLATELET # BLD AUTO: 248 10*3/MM3 (ref 150–450)
PMV BLD AUTO: 11.4 FL (ref 8–12)
POTASSIUM BLD-SCNC: 4.7 MMOL/L (ref 3.5–5.1)
RBC # BLD AUTO: 3.63 10*6/MM3 (ref 4.37–5.74)
SODIUM BLD-SCNC: 136 MMOL/L (ref 137–145)
WBC NRBC COR # BLD: 9.27 10*3/MM3 (ref 3.2–9.8)

## 2018-05-10 PROCEDURE — 85025 COMPLETE CBC W/AUTO DIFF WBC: CPT | Performed by: UROLOGY

## 2018-05-10 PROCEDURE — 80048 BASIC METABOLIC PNL TOTAL CA: CPT | Performed by: UROLOGY

## 2018-05-10 RX ORDER — OXYCODONE AND ACETAMINOPHEN 7.5; 325 MG/1; MG/1
1 TABLET ORAL EVERY 4 HOURS PRN
Qty: 15 TABLET | Refills: 0
Start: 2018-05-10 | End: 2021-01-01

## 2018-05-10 RX ORDER — CEPHALEXIN 250 MG/1
250 CAPSULE ORAL 2 TIMES DAILY
Qty: 14 CAPSULE | Refills: 0
Start: 2018-05-10 | End: 2018-05-17

## 2018-05-10 RX ADMIN — NIFEDIPINE 10 MG: 10 CAPSULE ORAL at 08:25

## 2018-05-10 RX ADMIN — METFORMIN HYDROCHLORIDE 500 MG: 500 TABLET ORAL at 08:24

## 2018-05-10 RX ADMIN — CARVEDILOL 12.5 MG: 12.5 TABLET, FILM COATED ORAL at 08:25

## 2018-05-10 RX ADMIN — HYDROCODONE BITARTRATE AND ACETAMINOPHEN 1 TABLET: 10; 325 TABLET ORAL at 03:24

## 2018-05-10 RX ADMIN — ASPIRIN 81 MG: 81 TABLET, COATED ORAL at 08:25

## 2018-05-10 RX ADMIN — ATROPA BELLADONNA AND OPIUM 30 MG: 16.2; 3 SUPPOSITORY RECTAL at 00:27

## 2018-05-10 RX ADMIN — LISINOPRIL 10 MG: 10 TABLET ORAL at 08:25

## 2018-05-10 RX ADMIN — HYDROCODONE BITARTRATE AND ACETAMINOPHEN 1 TABLET: 10; 325 TABLET ORAL at 08:28

## 2018-05-10 RX ADMIN — ATROPA BELLADONNA AND OPIUM 30 MG: 16.2; 3 SUPPOSITORY RECTAL at 14:22

## 2018-05-10 RX ADMIN — FINASTERIDE 5 MG: 5 TABLET, FILM COATED ORAL at 08:25

## 2018-05-10 RX ADMIN — GLIPIZIDE 5 MG: 5 TABLET ORAL at 08:25

## 2018-05-10 NOTE — NURSING NOTE
Pt and family educated on catheter care and changing dressing around suprapubic catheter. Pt and family demonstrated and verbalized understanding of teaching.

## 2018-05-10 NOTE — DISCHARGE SUMMARY
Date of Discharge:  5/10/2018    Discharge Diagnosis: Enlarged prostate with obstruction    Secondary Diagnosis: hematuria    History of Present Illness:  Enlarged prostate with urinary obstruction [N40.1, N13.8]  BPH (benign prostatic hyperplasia) [N40.0]       Hospital Course  Patient Eze Downing  is a 82 y.o. male presented with BPH with acute retention of urine after left inguinal hernia repair last month performed by Dr. Collado, failed voiding trials. Cystoscopy on 4/25/18 showed trilobar obstructing prostate and today is postop day 1 for cystoscopy TURP and SP tube placement. He is tolerating diet without nausea/vomiting, cystostomy site is clear without drainage/bleeding/signs of cellulitis, pain is controlled. Vitals and labs are stable. He did receive 81 mg aspirin last night and this morning, CBI is slowly running and urine is pink without clots. He will be discharged this afternoon if no clot hematuria develops after CBI is discontinued and SP tube/Porter secured to leg bags. He is to follow up in 4 days, Keflex and Percocet as added medications prior to discharge.     Procedures Performed  Procedure(s):  CYSTOSCOPY TRANSURETHRAL RESECTION OF PROSTATE; PLACEMENT SUPRAPUBIC CATHETER       Consults:   Consults     Date and Time Order Name Status Description    4/21/2018 0900 Inpatient Urology Consult Completed           Pertinent Test Results:   Lab Results (last 72 hours)     Procedure Component Value Units Date/Time    Basic Metabolic Panel [[id number]]  (Abnormal) Collected:  05/10/18 0601    Specimen:  Blood Updated:  05/10/18 0645     Glucose 125 (H) mg/dL      BUN 13 mg/dL      Creatinine 0.71 mg/dL      Sodium 136 (L) mmol/L      Potassium 4.7 mmol/L      Chloride 99 mmol/L      CO2 29.0 mmol/L      Calcium 9.6 mg/dL      eGFR Non African Amer 107 mL/min/1.73      BUN/Creatinine Ratio 18.3     Anion Gap 8.0 mmol/L     Narrative:       The MDRD GFR formula is only valid for adults with stable  renal function between ages 18 and 70.    CBC & Differential [[id number]] Collected:  05/10/18 0601    Specimen:  Blood Updated:  05/10/18 0626    Narrative:       The following orders were created for panel order CBC & Differential.  Procedure                               Abnormality         Status                     ---------                               -----------         ------                     CBC Auto Differential[[id number]]        Abnormal            Final result                 Please view results for these tests on the individual orders.    CBC Auto Differential [[id number]]  (Abnormal) Collected:  05/10/18 0601    Specimen:  Blood Updated:  05/10/18 0626     WBC 9.27 10*3/mm3      RBC 3.63 (L) 10*6/mm3      Hemoglobin 10.4 (L) g/dL      Hematocrit 30.9 (L) %      MCV 85.1 fL      MCH 28.7 pg      MCHC 33.7 g/dL      RDW 12.7 %      RDW-SD 39.2 fl      MPV 11.4 fL      Platelets 248 10*3/mm3      Neutrophil % 59.0 %      Lymphocyte % 27.0 %      Monocyte % 8.4 %      Eosinophil % 5.0 %      Basophil % 0.4 %      Immature Grans % 0.2 %      Neutrophils, Absolute 5.47 10*3/mm3      Lymphocytes, Absolute 2.50 10*3/mm3      Monocytes, Absolute 0.78 10*3/mm3      Eosinophils, Absolute 0.46 10*3/mm3      Basophils, Absolute 0.04 10*3/mm3      Immature Grans, Absolute 0.02 10*3/mm3     Basic Metabolic Panel [[id number]]  (Abnormal) Collected:  05/09/18 1852    Specimen:  Blood Updated:  05/09/18 1906     Glucose 123 (H) mg/dL      BUN 15 mg/dL      Creatinine 0.67 (L) mg/dL      Sodium 137 mmol/L      Potassium 4.9 mmol/L      Chloride 99 mmol/L      CO2 27.0 mmol/L      Calcium 9.6 mg/dL      eGFR Non African Amer 114 (H) mL/min/1.73      BUN/Creatinine Ratio 22.4     Anion Gap 11.0 mmol/L     Narrative:       The MDRD GFR formula is only valid for adults with stable renal function between ages 18 and 70.    CBC & Differential [[id number]] Collected:  05/09/18 1852    Specimen:  Blood Updated:  05/09/18 1857  "   Narrative:       The following orders were created for panel order CBC & Differential.  Procedure                               Abnormality         Status                     ---------                               -----------         ------                     CBC Auto Differential[[id number]]        Abnormal            Final result                 Please view results for these tests on the individual orders.    CBC Auto Differential [[id number]]  (Abnormal) Collected:  05/09/18 1852    Specimen:  Blood Updated:  05/09/18 1857     WBC 7.47 10*3/mm3      RBC 3.78 (L) 10*6/mm3      Hemoglobin 10.8 (L) g/dL      Hematocrit 31.6 (L) %      MCV 83.6 fL      MCH 28.6 pg      MCHC 34.2 g/dL      RDW 12.7 %      RDW-SD 38.5 fl      MPV 11.0 fL      Platelets 243 10*3/mm3      Neutrophil % 42.5 %      Lymphocyte % 42.8 %      Monocyte % 7.1 %      Eosinophil % 6.6 %      Basophil % 0.7 %      Immature Grans % 0.3 %      Neutrophils, Absolute 3.18 10*3/mm3      Lymphocytes, Absolute 3.20 10*3/mm3      Monocytes, Absolute 0.53 10*3/mm3      Eosinophils, Absolute 0.49 10*3/mm3      Basophils, Absolute 0.05 10*3/mm3      Immature Grans, Absolute 0.02 10*3/mm3     POC Glucose Once [[id number]]  (Abnormal) Collected:  05/09/18 1548    Specimen:  Blood Updated:  05/09/18 1601     Glucose 134 (H) mg/dL      Comment: RN NotifiedMeter: [id number]: [id number] ASHA GONZALEZ             Condition on Discharge:  stable    Vital Signs  Temp:  [96.8 °F (36 °C)-98.7 °F (37.1 °C)] 97.8 °F (36.6 °C)  Heart Rate:  [64-79] 76  Resp:  [18-22] 18  BP: (117-166)/(59-97) 149/69    Physical Exam:  General Appearance:  In no acute distress.    Vital signs: (most recent): Blood pressure 149/69, pulse 76, temperature 97.8 °F (36.6 °C), temperature source Axillary, resp. rate 18, height 165.1 cm (65\"), weight 64.7 kg (142 lb 10.2 oz), SpO2 93 %.  Vital signs are normal.  No fever.    Output: Producing urine (SP tube, Porter, CBI minimally " running, urine pink no clots).    HEENT: Normal HEENT exam.    Lungs:  Normal effort and normal respiratory rate.  Breath sounds clear to auscultation.    Heart: Normal rate.  Regular rhythm.  S1 normal and S2 normal.  No murmur.   Chest: Symmetric chest wall expansion.   Abdomen: Abdomen is soft and non-distended.  Bowel sounds are normal.   There is no abdominal tenderness.     Extremities: There is no deformity or dependent edema.    Pulses: Distal pulses are intact.    Neurological: Patient is alert and oriented to person, place and time.  GCS score is 15.    Pupils:  Pupils are equal, round, and reactive to light.    Skin:  Warm and dry.  No rash, ecchymosis or cyanosis.      Discharge Disposition  Home or Self Care    Discharge Medications   ChangEze   Home Medication Instructions KOBI:[id number]    Printed on:05/10/18 0900   Medication Information                      albuterol (PROVENTIL HFA;VENTOLIN HFA) 108 (90 Base) MCG/ACT inhaler  Inhale 2 puffs Every 6 (Six) Hours As Needed for Wheezing.             carvedilol (COREG) 12.5 MG tablet  Take 12.5 mg by mouth 2 (Two) Times a Day With Meals.             finasteride (PROSCAR) 5 MG tablet  Take 5 mg by mouth Daily.             glipiZIDE (GLUCOTROL) 5 MG tablet  Take 5 mg by mouth 2 (Two) Times a Day Before Meals.             lisinopril (PRINIVIL,ZESTRIL) 10 MG tablet  Take 10 mg by mouth Daily.             metFORMIN (GLUCOPHAGE) 500 MG tablet  Take 500 mg by mouth 2 (Two) Times a Day With Meals.             Multiple Vitamins-Calcium (ONE-A-DAY WITHIN PO)  Take 1 tablet by mouth Daily.             NIFEdipine (PROCARDIA) 10 MG capsule  Take 10 mg by mouth Daily.             oxyCODONE-acetaminophen (PERCOCET) 7.5-325 MG per tablet  Take 1 tablet by mouth Every 4 (Four) Hours As Needed (Pain) for up to 15 doses.             tamsulosin (FLOMAX) 0.4 MG capsule 24 hr capsule  Take 1 capsule by mouth Every Night.                 Discharge Diet:   Diet  Instructions     Diet:       Diet Texture / Consistency:  Regular    Fluid Consistency:  Thin          Activity at Discharge:   Activity Instructions     Activity as Tolerated       Other Restrictions (Specify)       No heavy lifting.   No driving while on narcotic.          Follow-up Appointments  No future appointments.  Additional Instructions for the Follow-ups that You Need to Schedule     Discharge Follow-up with PCP    As directed      Follow Up Details:  1 week PCP         Discharge Follow-up with Specialty: Dr. Forbes; 1 Week    As directed      Specialty:  Dr. Forbes    Follow Up:  1 Week    Follow Up Details:  Monday or Tuesday next week         Dressing Change Instructions    As directed      Dressing change: SP tube site daily with drain sponge after cleaning with soap and water.  Porter care.    Order Comments:  Dressing change: SP tube site daily with drain sponge after cleaning with soap and water. Porter care.          Notify Physician or Go To The ED For the Following Conditions    As directed      Clot hematuria, Porter/SP tube not draining urine, abdominal pain, SOA, CP, fever    Order Comments:  Clot hematuria, Porter/SP tube not draining urine, abdominal pain, SOA, CP, fever                Test Results Pending at Discharge   Order Current Status    Tissue Pathology Exam Collected (05/09/18 2844)           MART Strong  05/10/18  9:00 AM

## 2018-05-10 NOTE — CONSULTS
"Nutrition Services    Patient Name:  Eze Downing  YOB: 1935  MRN: [id number]  Admit Date:  5/9/2018    Pt is being discharged today. RD visited due to MST score of 5.  Pt reports that he was eating well at home and has lost wt but it has been intentional.  He really is a \"picky eater\" and often does not eat well in the hospital.  No Gi distress.  \"I will when I get home\"  Offered menu suggestions and alternatives.  Rd will monitor prn.      Electronically signed by:  Zelda Sorensen RD  05/10/18 11:11 AM   "

## 2018-05-10 NOTE — PROGRESS NOTES
"   LOS: 0 days   Patient Care Team:  Vielka Ramirez DO as PCP - General    Subjective     Subjective:  Symptoms:  Stable.  No shortness of breath, malaise, cough or chest pain.  (Urine pink, no clots, CBI slowly running. Aspirin last night and this morning, since discontinued. ).    Diet:  Adequate intake.  No nausea or vomiting.    Activity level: Returning to normal.    Pain:  Pain is well controlled.        History taken from: patient chart RN    Objective     Vital Signs  Temp:  [96.8 °F (36 °C)-98.7 °F (37.1 °C)] 97.8 °F (36.6 °C)  Heart Rate:  [64-79] 76  Resp:  [18-22] 18  BP: (117-166)/(59-97) 149/69    Objective:  General Appearance:  In no acute distress.    Vital signs: (most recent): Blood pressure 149/69, pulse 76, temperature 97.8 °F (36.6 °C), temperature source Axillary, resp. rate 18, height 165.1 cm (65\"), weight 64.7 kg (142 lb 10.2 oz), SpO2 93 %.  Vital signs are normal.  No fever.    Output: Producing urine (SP tube, Porter, CBI minimally running, urine pink no clots).    HEENT: Normal HEENT exam.    Lungs:  Normal effort and normal respiratory rate.  Breath sounds clear to auscultation.    Heart: Normal rate.  Regular rhythm.  S1 normal and S2 normal.  No murmur.   Chest: Symmetric chest wall expansion.   Abdomen: Abdomen is soft and non-distended.  Bowel sounds are normal.   There is no abdominal tenderness.     Extremities: There is no deformity or dependent edema.    Pulses: Distal pulses are intact.    Neurological: Patient is alert and oriented to person, place and time.  GCS score is 15.    Pupils:  Pupils are equal, round, and reactive to light.    Skin:  Warm and dry.  No rash, ecchymosis or cyanosis.             Results Review:    Lab Results (last 24 hours)     Procedure Component Value Units Date/Time    Basic Metabolic Panel [[id number]]  (Abnormal) Collected:  05/10/18 0601    Specimen:  Blood Updated:  05/10/18 0645     Glucose 125 (H) mg/dL      BUN 13 mg/dL      Creatinine 0.71 " mg/dL      Sodium 136 (L) mmol/L      Potassium 4.7 mmol/L      Chloride 99 mmol/L      CO2 29.0 mmol/L      Calcium 9.6 mg/dL      eGFR Non African Amer 107 mL/min/1.73      BUN/Creatinine Ratio 18.3     Anion Gap 8.0 mmol/L     Narrative:       The MDRD GFR formula is only valid for adults with stable renal function between ages 18 and 70.    CBC & Differential [[id number]] Collected:  05/10/18 0601    Specimen:  Blood Updated:  05/10/18 0626    Narrative:       The following orders were created for panel order CBC & Differential.  Procedure                               Abnormality         Status                     ---------                               -----------         ------                     CBC Auto Differential[[id number]]        Abnormal            Final result                 Please view results for these tests on the individual orders.    CBC Auto Differential [[id number]]  (Abnormal) Collected:  05/10/18 0601    Specimen:  Blood Updated:  05/10/18 0626     WBC 9.27 10*3/mm3      RBC 3.63 (L) 10*6/mm3      Hemoglobin 10.4 (L) g/dL      Hematocrit 30.9 (L) %      MCV 85.1 fL      MCH 28.7 pg      MCHC 33.7 g/dL      RDW 12.7 %      RDW-SD 39.2 fl      MPV 11.4 fL      Platelets 248 10*3/mm3      Neutrophil % 59.0 %      Lymphocyte % 27.0 %      Monocyte % 8.4 %      Eosinophil % 5.0 %      Basophil % 0.4 %      Immature Grans % 0.2 %      Neutrophils, Absolute 5.47 10*3/mm3      Lymphocytes, Absolute 2.50 10*3/mm3      Monocytes, Absolute 0.78 10*3/mm3      Eosinophils, Absolute 0.46 10*3/mm3      Basophils, Absolute 0.04 10*3/mm3      Immature Grans, Absolute 0.02 10*3/mm3     Basic Metabolic Panel [[id number]]  (Abnormal) Collected:  05/09/18 1852    Specimen:  Blood Updated:  05/09/18 1906     Glucose 123 (H) mg/dL      BUN 15 mg/dL      Creatinine 0.67 (L) mg/dL      Sodium 137 mmol/L      Potassium 4.9 mmol/L      Chloride 99 mmol/L      CO2 27.0 mmol/L      Calcium 9.6 mg/dL      eGFR Non African  Amer 114 (H) mL/min/1.73      BUN/Creatinine Ratio 22.4     Anion Gap 11.0 mmol/L     Narrative:       The MDRD GFR formula is only valid for adults with stable renal function between ages 18 and 70.    CBC & Differential [[id number]] Collected:  05/09/18 1852    Specimen:  Blood Updated:  05/09/18 1857    Narrative:       The following orders were created for panel order CBC & Differential.  Procedure                               Abnormality         Status                     ---------                               -----------         ------                     CBC Auto Differential[[id number]]        Abnormal            Final result                 Please view results for these tests on the individual orders.    CBC Auto Differential [[id number]]  (Abnormal) Collected:  05/09/18 1852    Specimen:  Blood Updated:  05/09/18 1857     WBC 7.47 10*3/mm3      RBC 3.78 (L) 10*6/mm3      Hemoglobin 10.8 (L) g/dL      Hematocrit 31.6 (L) %      MCV 83.6 fL      MCH 28.6 pg      MCHC 34.2 g/dL      RDW 12.7 %      RDW-SD 38.5 fl      MPV 11.0 fL      Platelets 243 10*3/mm3      Neutrophil % 42.5 %      Lymphocyte % 42.8 %      Monocyte % 7.1 %      Eosinophil % 6.6 %      Basophil % 0.7 %      Immature Grans % 0.3 %      Neutrophils, Absolute 3.18 10*3/mm3      Lymphocytes, Absolute 3.20 10*3/mm3      Monocytes, Absolute 0.53 10*3/mm3      Eosinophils, Absolute 0.49 10*3/mm3      Basophils, Absolute 0.05 10*3/mm3      Immature Grans, Absolute 0.02 10*3/mm3     POC Glucose Once [[id number]]  (Abnormal) Collected:  05/09/18 1548    Specimen:  Blood Updated:  05/09/18 1601     Glucose 134 (H) mg/dL      Comment: RN NotifiedMeter: [id number]: [id number] ASHA GONZALEZ            Imaging Results (last 24 hours)     ** No results found for the last 24 hours. **           I reviewed the patient's new clinical results.  I reviewed the patient's new imaging results and agree with the interpretation.  I reviewed the patient's  other test results and agree with the interpretation      Assessment/Plan     Principal Problem:    Enlarged prostate with urinary obstruction  Active Problems:    BPH (benign prostatic hyperplasia)      Assessment & Plan  1. Enlarged prostate with obstruction, POD #1 TURP with SP tube  --Cystostomy site clear, Porter, CBI minimally running and urine pink no clots  --Hgb/Hct 10.4/30.9, stable  --Stable renal function  --Vitals stable    2. Hematuria, pink urine no clots  -Hgb/Hct 10.4/30.9, stable  -Aspirin 81 mg given last night and this morning, since HELD.     Plan: Discharge home if NO clot hematuria once CBI is discontinued, SP tube and Porter to leg bags. Follow up with Dr. Forbes Monday or Tuesday next week. Rx: Keflex and Percocet per Dr. Forbes.    Manfred Abdi, APRN  05/10/18  8:47 AM

## 2018-05-10 NOTE — PLAN OF CARE
Problem: Patient Care Overview  Goal: Plan of Care Review  Outcome: Ongoing (interventions implemented as appropriate)   05/10/18 0452   Coping/Psychosocial   Plan of Care Reviewed With patient   Plan of Care Review   Progress no change   OTHER   Outcome Summary VSS, pain controlled, urine is watermelon pink colored, ambulated to bathroom, continue to monitor     Goal: Individualization and Mutuality  Outcome: Ongoing (interventions implemented as appropriate)      Problem: Fall Risk (Adult)  Goal: Identify Related Risk Factors and Signs and Symptoms  Outcome: Ongoing (interventions implemented as appropriate)    Goal: Absence of Fall  Outcome: Ongoing (interventions implemented as appropriate)      Problem: Transurethral Resection of the Prostate (Adult)  Goal: Signs and Symptoms of Listed Potential Problems Will be Absent, Minimized or Managed (Transurethral Resection of the Prostate)  Outcome: Ongoing (interventions implemented as appropriate)    Goal: Anesthesia/Sedation Recovery  Outcome: Outcome(s) achieved Date Met: 05/10/18

## 2018-05-11 LAB
LAB AP CASE REPORT: NORMAL
Lab: NORMAL
PATH REPORT.FINAL DX SPEC: NORMAL
PATH REPORT.GROSS SPEC: NORMAL

## 2018-05-16 NOTE — PROGRESS NOTES
CHIEF COMPLAINT:   Chief Complaint   Patient presents with   • Post-op Follow-up     Post operative left inguinal hernia 4-19-18.       HPI: This patient presents for a post-operative visit after undergoing a open inguinal hernia repair no mesh.  Patient reports no problems. Eating well without any significant nausea. Having good bowel function. No problems with constipation or diarrhea. No urinary complaints. Denies fever. Ambulating well and slowly returning to normal activities.      PHYSICAL EXAM:    ABD: Incisions are healing well without any erythema or signs of infection. No hernia recurrence is noted.    ASSESSMENT:    Eze was seen today for post-op follow-up.    Diagnoses and all orders for this visit:    S/P repair of inguinal hernia        PLAN:    1. The patient will follow-up 1 month unless any problems arise.  2. May shower.   3. May return to normal activity without restrictions 6 weeks after operation.          This document has been electronically signed by Ramin Collado MD on May 16, 2018 12:05 AM

## 2021-01-01 ENCOUNTER — READMISSION MANAGEMENT (OUTPATIENT)
Dept: CALL CENTER | Facility: HOSPITAL | Age: 86
End: 2021-01-01

## 2021-01-01 ENCOUNTER — LAB (OUTPATIENT)
Dept: LAB | Facility: HOSPITAL | Age: 86
End: 2021-01-01

## 2021-01-01 ENCOUNTER — APPOINTMENT (OUTPATIENT)
Dept: GENERAL RADIOLOGY | Facility: HOSPITAL | Age: 86
End: 2021-01-01

## 2021-01-01 ENCOUNTER — APPOINTMENT (OUTPATIENT)
Dept: VACCINE CLINIC | Facility: HOSPITAL | Age: 86
End: 2021-01-01

## 2021-01-01 ENCOUNTER — IMMUNIZATION (OUTPATIENT)
Dept: VACCINE CLINIC | Facility: HOSPITAL | Age: 86
End: 2021-01-01

## 2021-01-01 ENCOUNTER — APPOINTMENT (OUTPATIENT)
Dept: CARDIOLOGY | Facility: HOSPITAL | Age: 86
End: 2021-01-01

## 2021-01-01 ENCOUNTER — OFFICE VISIT (OUTPATIENT)
Dept: CARDIOLOGY | Facility: CLINIC | Age: 86
End: 2021-01-01

## 2021-01-01 ENCOUNTER — HOSPITAL ENCOUNTER (INPATIENT)
Facility: HOSPITAL | Age: 86
LOS: 5 days | Discharge: HOME HEALTH | End: 2021-04-17
Attending: FAMILY MEDICINE | Admitting: INTERNAL MEDICINE

## 2021-01-01 VITALS
HEIGHT: 67 IN | TEMPERATURE: 97.1 F | HEART RATE: 84 BPM | SYSTOLIC BLOOD PRESSURE: 104 MMHG | OXYGEN SATURATION: 92 % | WEIGHT: 152.44 LBS | RESPIRATION RATE: 18 BRPM | BODY MASS INDEX: 23.93 KG/M2 | DIASTOLIC BLOOD PRESSURE: 59 MMHG

## 2021-01-01 VITALS
TEMPERATURE: 97.5 F | OXYGEN SATURATION: 98 % | BODY MASS INDEX: 23.86 KG/M2 | DIASTOLIC BLOOD PRESSURE: 60 MMHG | WEIGHT: 152 LBS | SYSTOLIC BLOOD PRESSURE: 108 MMHG | HEART RATE: 60 BPM | HEIGHT: 67 IN

## 2021-01-01 DIAGNOSIS — I50.20 HFREF (HEART FAILURE WITH REDUCED EJECTION FRACTION) (HCC): ICD-10-CM

## 2021-01-01 DIAGNOSIS — I50.21 ACUTE SYSTOLIC CONGESTIVE HEART FAILURE (HCC): ICD-10-CM

## 2021-01-01 DIAGNOSIS — Z74.09 IMPAIRED FUNCTIONAL MOBILITY, BALANCE, GAIT, AND ENDURANCE: ICD-10-CM

## 2021-01-01 DIAGNOSIS — Z78.9 IMPAIRED MOBILITY AND ACTIVITIES OF DAILY LIVING: ICD-10-CM

## 2021-01-01 DIAGNOSIS — I25.5 ISCHEMIC CARDIOMYOPATHY: Primary | ICD-10-CM

## 2021-01-01 DIAGNOSIS — Z74.09 IMPAIRED MOBILITY AND ACTIVITIES OF DAILY LIVING: ICD-10-CM

## 2021-01-01 DIAGNOSIS — I10 ESSENTIAL HYPERTENSION: ICD-10-CM

## 2021-01-01 DIAGNOSIS — R13.11 ORAL PHASE DYSPHAGIA: ICD-10-CM

## 2021-01-01 DIAGNOSIS — I21.4 NSTEMI (NON-ST ELEVATED MYOCARDIAL INFARCTION) (HCC): ICD-10-CM

## 2021-01-01 DIAGNOSIS — J18.9 PNEUMONIA DUE TO INFECTIOUS ORGANISM, UNSPECIFIED LATERALITY, UNSPECIFIED PART OF LUNG: Primary | ICD-10-CM

## 2021-01-01 LAB
ALBUMIN SERPL-MCNC: 3.3 G/DL (ref 3.5–5.2)
ALBUMIN SERPL-MCNC: 4.2 G/DL (ref 3.5–5.2)
ALBUMIN/GLOB SERPL: 1 G/DL
ALBUMIN/GLOB SERPL: 1.7 G/DL
ALP SERPL-CCNC: 74 U/L (ref 39–117)
ALP SERPL-CCNC: 76 U/L (ref 39–117)
ALT SERPL W P-5'-P-CCNC: 10 U/L (ref 1–41)
ALT SERPL W P-5'-P-CCNC: 14 U/L (ref 1–41)
ANION GAP SERPL CALCULATED.3IONS-SCNC: 10 MMOL/L (ref 5–15)
ANION GAP SERPL CALCULATED.3IONS-SCNC: 10.4 MMOL/L (ref 5–15)
ANION GAP SERPL CALCULATED.3IONS-SCNC: 11 MMOL/L (ref 5–15)
ANION GAP SERPL CALCULATED.3IONS-SCNC: 13 MMOL/L (ref 5–15)
ANION GAP SERPL CALCULATED.3IONS-SCNC: 13 MMOL/L (ref 5–15)
ANION GAP SERPL CALCULATED.3IONS-SCNC: 14 MMOL/L (ref 5–15)
ANION GAP SERPL CALCULATED.3IONS-SCNC: 15 MMOL/L (ref 5–15)
ANION GAP SERPL CALCULATED.3IONS-SCNC: 7 MMOL/L (ref 5–15)
ANION GAP SERPL CALCULATED.3IONS-SCNC: 8 MMOL/L (ref 5–15)
APTT PPP: 29.1 SECONDS (ref 20–40.3)
APTT PPP: 56.2 SECONDS (ref 20–40.3)
AST SERPL-CCNC: 25 U/L (ref 1–40)
AST SERPL-CCNC: 48 U/L (ref 1–40)
BACTERIA SPEC AEROBE CULT: NORMAL
BASOPHILS # BLD AUTO: 0.01 10*3/MM3 (ref 0–0.2)
BASOPHILS # BLD AUTO: 0.02 10*3/MM3 (ref 0–0.2)
BASOPHILS # BLD AUTO: 0.03 10*3/MM3 (ref 0–0.2)
BASOPHILS # BLD AUTO: 0.03 10*3/MM3 (ref 0–0.2)
BASOPHILS # BLD AUTO: 0.04 10*3/MM3 (ref 0–0.2)
BASOPHILS NFR BLD AUTO: 0.1 % (ref 0–1.5)
BASOPHILS NFR BLD AUTO: 0.2 % (ref 0–1.5)
BASOPHILS NFR BLD AUTO: 0.3 % (ref 0–1.5)
BASOPHILS NFR BLD AUTO: 0.4 % (ref 0–1.5)
BASOPHILS NFR BLD AUTO: 0.6 % (ref 0–1.5)
BH CV ECHO MEAS - ACS: 1.8 CM
BH CV ECHO MEAS - AO MAX PG (FULL): 2.6 MMHG
BH CV ECHO MEAS - AO MAX PG: 6.6 MMHG
BH CV ECHO MEAS - AO MEAN PG (FULL): 2 MMHG
BH CV ECHO MEAS - AO MEAN PG: 4 MMHG
BH CV ECHO MEAS - AO ROOT AREA (BSA CORRECTED): 1.9
BH CV ECHO MEAS - AO ROOT AREA: 9.6 CM^2
BH CV ECHO MEAS - AO ROOT DIAM: 3.5 CM
BH CV ECHO MEAS - AO V2 MAX: 128 CM/SEC
BH CV ECHO MEAS - AO V2 MEAN: 90.1 CM/SEC
BH CV ECHO MEAS - AO V2 VTI: 16.8 CM
BH CV ECHO MEAS - ASC AORTA: 3.2 CM
BH CV ECHO MEAS - AVA(I,A): 1.8 CM^2
BH CV ECHO MEAS - AVA(I,D): 1.8 CM^2
BH CV ECHO MEAS - AVA(V,A): 2.2 CM^2
BH CV ECHO MEAS - AVA(V,D): 2.2 CM^2
BH CV ECHO MEAS - BSA(HAYCOCK): 1.8 M^2
BH CV ECHO MEAS - BSA: 1.8 M^2
BH CV ECHO MEAS - BZI_BMI: 23.8 KILOGRAMS/M^2
BH CV ECHO MEAS - BZI_METRIC_HEIGHT: 170.2 CM
BH CV ECHO MEAS - BZI_METRIC_WEIGHT: 68.9 KG
BH CV ECHO MEAS - EDV(CUBED): 166.4 ML
BH CV ECHO MEAS - EDV(MOD-SP2): 145 ML
BH CV ECHO MEAS - EDV(MOD-SP4): 136 ML
BH CV ECHO MEAS - EDV(TEICH): 147.4 ML
BH CV ECHO MEAS - EF(CUBED): 29.7 %
BH CV ECHO MEAS - EF(MOD-SP2): 10.3 %
BH CV ECHO MEAS - EF(MOD-SP4): 29.5 %
BH CV ECHO MEAS - EF(TEICH): 23.8 %
BH CV ECHO MEAS - ESV(CUBED): 116.9 ML
BH CV ECHO MEAS - ESV(MOD-SP2): 130 ML
BH CV ECHO MEAS - ESV(MOD-SP4): 95.9 ML
BH CV ECHO MEAS - ESV(TEICH): 112.3 ML
BH CV ECHO MEAS - FS: 11.1 %
BH CV ECHO MEAS - IVS/LVPW: 1.2
BH CV ECHO MEAS - IVSD: 1.1 CM
BH CV ECHO MEAS - LA DIMENSION: 4 CM
BH CV ECHO MEAS - LA/AO: 1.1
BH CV ECHO MEAS - LV DIASTOLIC VOL/BSA (35-75): 75.6 ML/M^2
BH CV ECHO MEAS - LV MASS(C)D: 208.3 GRAMS
BH CV ECHO MEAS - LV MASS(C)DI: 115.7 GRAMS/M^2
BH CV ECHO MEAS - LV MAX PG: 4 MMHG
BH CV ECHO MEAS - LV MEAN PG: 2 MMHG
BH CV ECHO MEAS - LV SYSTOLIC VOL/BSA (12-30): 53.3 ML/M^2
BH CV ECHO MEAS - LV V1 MAX: 99.9 CM/SEC
BH CV ECHO MEAS - LV V1 MEAN: 60.7 CM/SEC
BH CV ECHO MEAS - LV V1 VTI: 10.9 CM
BH CV ECHO MEAS - LVIDD: 5.5 CM
BH CV ECHO MEAS - LVIDS: 4.9 CM
BH CV ECHO MEAS - LVLD AP2: 9.3 CM
BH CV ECHO MEAS - LVLD AP4: 8.9 CM
BH CV ECHO MEAS - LVLS AP2: 9 CM
BH CV ECHO MEAS - LVLS AP4: 9 CM
BH CV ECHO MEAS - LVOT AREA (M): 2.8 CM^2
BH CV ECHO MEAS - LVOT AREA: 2.8 CM^2
BH CV ECHO MEAS - LVOT DIAM: 1.9 CM
BH CV ECHO MEAS - LVPWD: 0.88 CM
BH CV ECHO MEAS - MR MAX PG: 59.3 MMHG
BH CV ECHO MEAS - MR MAX VEL: 385 CM/SEC
BH CV ECHO MEAS - MV A MAX VEL: 112 CM/SEC
BH CV ECHO MEAS - MV DEC SLOPE: 714 CM/SEC^2
BH CV ECHO MEAS - MV E MAX VEL: 90 CM/SEC
BH CV ECHO MEAS - MV E/A: 0.8
BH CV ECHO MEAS - MV MAX PG: 5.8 MMHG
BH CV ECHO MEAS - MV MEAN PG: 4 MMHG
BH CV ECHO MEAS - MV P1/2T MAX VEL: 104 CM/SEC
BH CV ECHO MEAS - MV P1/2T: 42.7 MSEC
BH CV ECHO MEAS - MV V2 MAX: 120 CM/SEC
BH CV ECHO MEAS - MV V2 MEAN: 91.5 CM/SEC
BH CV ECHO MEAS - MV V2 VTI: 17 CM
BH CV ECHO MEAS - MVA P1/2T LCG: 2.1 CM^2
BH CV ECHO MEAS - MVA(P1/2T): 5.2 CM^2
BH CV ECHO MEAS - MVA(VTI): 1.8 CM^2
BH CV ECHO MEAS - PA MAX PG: 4.3 MMHG
BH CV ECHO MEAS - PA V2 MAX: 104 CM/SEC
BH CV ECHO MEAS - RAP SYSTOLE: 10 MMHG
BH CV ECHO MEAS - RVDD: 2.7 CM
BH CV ECHO MEAS - RVSP: 63.6 MMHG
BH CV ECHO MEAS - SI(AO): 89.8 ML/M^2
BH CV ECHO MEAS - SI(CUBED): 27.5 ML/M^2
BH CV ECHO MEAS - SI(LVOT): 17.2 ML/M^2
BH CV ECHO MEAS - SI(MOD-SP2): 8.3 ML/M^2
BH CV ECHO MEAS - SI(MOD-SP4): 22.3 ML/M^2
BH CV ECHO MEAS - SI(TEICH): 19.5 ML/M^2
BH CV ECHO MEAS - SV(AO): 161.6 ML
BH CV ECHO MEAS - SV(CUBED): 49.4 ML
BH CV ECHO MEAS - SV(LVOT): 30.9 ML
BH CV ECHO MEAS - SV(MOD-SP2): 15 ML
BH CV ECHO MEAS - SV(MOD-SP4): 40.1 ML
BH CV ECHO MEAS - SV(TEICH): 35.1 ML
BH CV ECHO MEAS - TR MAX VEL: 366 CM/SEC
BILIRUB SERPL-MCNC: 0.8 MG/DL (ref 0–1.2)
BILIRUB SERPL-MCNC: 1 MG/DL (ref 0–1.2)
BILIRUB UR QL STRIP: NEGATIVE
BUN SERPL-MCNC: 14 MG/DL (ref 8–23)
BUN SERPL-MCNC: 19 MG/DL (ref 8–23)
BUN SERPL-MCNC: 24 MG/DL (ref 8–23)
BUN SERPL-MCNC: 36 MG/DL (ref 8–23)
BUN SERPL-MCNC: 45 MG/DL (ref 8–23)
BUN SERPL-MCNC: 56 MG/DL (ref 8–23)
BUN SERPL-MCNC: 60 MG/DL (ref 8–23)
BUN SERPL-MCNC: 61 MG/DL (ref 8–23)
BUN SERPL-MCNC: 75 MG/DL (ref 8–23)
BUN/CREAT SERPL: 13 (ref 7–25)
BUN/CREAT SERPL: 17.4 (ref 7–25)
BUN/CREAT SERPL: 19.4 (ref 7–25)
BUN/CREAT SERPL: 27.9 (ref 7–25)
BUN/CREAT SERPL: 34.6 (ref 7–25)
BUN/CREAT SERPL: 48.8 (ref 7–25)
BUN/CREAT SERPL: 49.6 (ref 7–25)
BUN/CREAT SERPL: 50 (ref 7–25)
BUN/CREAT SERPL: 55.6 (ref 7–25)
CALCIUM SPEC-SCNC: 8.8 MG/DL (ref 8.6–10.5)
CALCIUM SPEC-SCNC: 8.9 MG/DL (ref 8.6–10.5)
CALCIUM SPEC-SCNC: 9 MG/DL (ref 8.6–10.5)
CALCIUM SPEC-SCNC: 9.2 MG/DL (ref 8.6–10.5)
CALCIUM SPEC-SCNC: 9.3 MG/DL (ref 8.6–10.5)
CALCIUM SPEC-SCNC: 9.6 MG/DL (ref 8.6–10.5)
CALCIUM SPEC-SCNC: 9.7 MG/DL (ref 8.6–10.5)
CHLORIDE SERPL-SCNC: 100 MMOL/L (ref 98–107)
CHLORIDE SERPL-SCNC: 93 MMOL/L (ref 98–107)
CHLORIDE SERPL-SCNC: 94 MMOL/L (ref 98–107)
CHLORIDE SERPL-SCNC: 95 MMOL/L (ref 98–107)
CHLORIDE SERPL-SCNC: 95 MMOL/L (ref 98–107)
CHLORIDE SERPL-SCNC: 96 MMOL/L (ref 98–107)
CHLORIDE SERPL-SCNC: 97 MMOL/L (ref 98–107)
CHOLEST SERPL-MCNC: 152 MG/DL (ref 0–200)
CLARITY UR: CLEAR
CO2 SERPL-SCNC: 21 MMOL/L (ref 22–29)
CO2 SERPL-SCNC: 25 MMOL/L (ref 22–29)
CO2 SERPL-SCNC: 26 MMOL/L (ref 22–29)
CO2 SERPL-SCNC: 26 MMOL/L (ref 22–29)
CO2 SERPL-SCNC: 27 MMOL/L (ref 22–29)
CO2 SERPL-SCNC: 27 MMOL/L (ref 22–29)
CO2 SERPL-SCNC: 29.6 MMOL/L (ref 22–29)
CO2 SERPL-SCNC: 30 MMOL/L (ref 22–29)
CO2 SERPL-SCNC: 31 MMOL/L (ref 22–29)
COLOR UR: YELLOW
CREAT SERPL-MCNC: 1.08 MG/DL (ref 0.76–1.27)
CREAT SERPL-MCNC: 1.08 MG/DL (ref 0.76–1.27)
CREAT SERPL-MCNC: 1.09 MG/DL (ref 0.76–1.27)
CREAT SERPL-MCNC: 1.13 MG/DL (ref 0.76–1.27)
CREAT SERPL-MCNC: 1.24 MG/DL (ref 0.76–1.27)
CREAT SERPL-MCNC: 1.25 MG/DL (ref 0.76–1.27)
CREAT SERPL-MCNC: 1.29 MG/DL (ref 0.76–1.27)
CREAT SERPL-MCNC: 1.3 MG/DL (ref 0.76–1.27)
CREAT SERPL-MCNC: 1.5 MG/DL (ref 0.76–1.27)
D-LACTATE SERPL-SCNC: 1.7 MMOL/L (ref 0.5–2)
DEPRECATED RDW RBC AUTO: 39.8 FL (ref 37–54)
DEPRECATED RDW RBC AUTO: 40.1 FL (ref 37–54)
DEPRECATED RDW RBC AUTO: 41 FL (ref 37–54)
DEPRECATED RDW RBC AUTO: 41.2 FL (ref 37–54)
DEPRECATED RDW RBC AUTO: 41.2 FL (ref 37–54)
DEPRECATED RDW RBC AUTO: 41.5 FL (ref 37–54)
DEPRECATED RDW RBC AUTO: 41.9 FL (ref 37–54)
DEPRECATED RDW RBC AUTO: 42.2 FL (ref 37–54)
DEPRECATED RDW RBC AUTO: 43.2 FL (ref 37–54)
EOSINOPHIL # BLD AUTO: 0 10*3/MM3 (ref 0–0.4)
EOSINOPHIL # BLD AUTO: 0.01 10*3/MM3 (ref 0–0.4)
EOSINOPHIL # BLD AUTO: 0.02 10*3/MM3 (ref 0–0.4)
EOSINOPHIL # BLD AUTO: 0.05 10*3/MM3 (ref 0–0.4)
EOSINOPHIL NFR BLD AUTO: 0 % (ref 0.3–6.2)
EOSINOPHIL NFR BLD AUTO: 0.1 % (ref 0.3–6.2)
EOSINOPHIL NFR BLD AUTO: 0.2 % (ref 0.3–6.2)
EOSINOPHIL NFR BLD AUTO: 0.8 % (ref 0.3–6.2)
ERYTHROCYTE [DISTWIDTH] IN BLOOD BY AUTOMATED COUNT: 13.2 % (ref 12.3–15.4)
ERYTHROCYTE [DISTWIDTH] IN BLOOD BY AUTOMATED COUNT: 13.3 % (ref 12.3–15.4)
ERYTHROCYTE [DISTWIDTH] IN BLOOD BY AUTOMATED COUNT: 13.3 % (ref 12.3–15.4)
ERYTHROCYTE [DISTWIDTH] IN BLOOD BY AUTOMATED COUNT: 13.5 % (ref 12.3–15.4)
ERYTHROCYTE [DISTWIDTH] IN BLOOD BY AUTOMATED COUNT: 14 % (ref 12.3–15.4)
FLUAV RNA RESP QL NAA+PROBE: NOT DETECTED
FLUBV RNA RESP QL NAA+PROBE: NOT DETECTED
GFR SERPL CREATININE-BSD FRML MDRD: 45 ML/MIN/1.73
GFR SERPL CREATININE-BSD FRML MDRD: 53 ML/MIN/1.73
GFR SERPL CREATININE-BSD FRML MDRD: 53 ML/MIN/1.73
GFR SERPL CREATININE-BSD FRML MDRD: 55 ML/MIN/1.73
GFR SERPL CREATININE-BSD FRML MDRD: 56 ML/MIN/1.73
GFR SERPL CREATININE-BSD FRML MDRD: 62 ML/MIN/1.73
GFR SERPL CREATININE-BSD FRML MDRD: 65 ML/MIN/1.73
GLOBULIN UR ELPH-MCNC: 2.5 GM/DL
GLOBULIN UR ELPH-MCNC: 3.3 GM/DL
GLUCOSE BLDC GLUCOMTR-MCNC: 140 MG/DL (ref 70–130)
GLUCOSE BLDC GLUCOMTR-MCNC: 152 MG/DL (ref 70–130)
GLUCOSE BLDC GLUCOMTR-MCNC: 159 MG/DL (ref 70–130)
GLUCOSE BLDC GLUCOMTR-MCNC: 164 MG/DL (ref 70–130)
GLUCOSE BLDC GLUCOMTR-MCNC: 169 MG/DL (ref 70–130)
GLUCOSE BLDC GLUCOMTR-MCNC: 169 MG/DL (ref 70–130)
GLUCOSE BLDC GLUCOMTR-MCNC: 171 MG/DL (ref 70–130)
GLUCOSE BLDC GLUCOMTR-MCNC: 172 MG/DL (ref 70–130)
GLUCOSE BLDC GLUCOMTR-MCNC: 174 MG/DL (ref 70–130)
GLUCOSE BLDC GLUCOMTR-MCNC: 174 MG/DL (ref 70–130)
GLUCOSE BLDC GLUCOMTR-MCNC: 178 MG/DL (ref 70–130)
GLUCOSE BLDC GLUCOMTR-MCNC: 180 MG/DL (ref 70–130)
GLUCOSE BLDC GLUCOMTR-MCNC: 187 MG/DL (ref 70–130)
GLUCOSE BLDC GLUCOMTR-MCNC: 193 MG/DL (ref 70–130)
GLUCOSE BLDC GLUCOMTR-MCNC: 200 MG/DL (ref 70–130)
GLUCOSE BLDC GLUCOMTR-MCNC: 219 MG/DL (ref 70–130)
GLUCOSE BLDC GLUCOMTR-MCNC: 233 MG/DL (ref 70–130)
GLUCOSE BLDC GLUCOMTR-MCNC: 233 MG/DL (ref 70–130)
GLUCOSE BLDC GLUCOMTR-MCNC: 237 MG/DL (ref 70–130)
GLUCOSE BLDC GLUCOMTR-MCNC: 247 MG/DL (ref 70–130)
GLUCOSE BLDC GLUCOMTR-MCNC: 249 MG/DL (ref 70–130)
GLUCOSE BLDC GLUCOMTR-MCNC: 272 MG/DL (ref 70–130)
GLUCOSE BLDC GLUCOMTR-MCNC: 283 MG/DL (ref 70–130)
GLUCOSE BLDC GLUCOMTR-MCNC: 284 MG/DL (ref 70–130)
GLUCOSE BLDC GLUCOMTR-MCNC: 295 MG/DL (ref 70–130)
GLUCOSE BLDC GLUCOMTR-MCNC: 304 MG/DL (ref 70–130)
GLUCOSE SERPL-MCNC: 168 MG/DL (ref 65–99)
GLUCOSE SERPL-MCNC: 176 MG/DL (ref 65–99)
GLUCOSE SERPL-MCNC: 186 MG/DL (ref 65–99)
GLUCOSE SERPL-MCNC: 187 MG/DL (ref 65–99)
GLUCOSE SERPL-MCNC: 198 MG/DL (ref 65–99)
GLUCOSE SERPL-MCNC: 245 MG/DL (ref 65–99)
GLUCOSE SERPL-MCNC: 254 MG/DL (ref 65–99)
GLUCOSE SERPL-MCNC: 265 MG/DL (ref 65–99)
GLUCOSE SERPL-MCNC: 91 MG/DL (ref 65–99)
GLUCOSE UR STRIP-MCNC: NEGATIVE MG/DL
HBA1C MFR BLD: 7.2 % (ref 4.8–5.6)
HCT VFR BLD AUTO: 25.7 % (ref 37.5–51)
HCT VFR BLD AUTO: 27.9 % (ref 37.5–51)
HCT VFR BLD AUTO: 27.9 % (ref 37.5–51)
HCT VFR BLD AUTO: 28 % (ref 37.5–51)
HCT VFR BLD AUTO: 28.7 % (ref 37.5–51)
HCT VFR BLD AUTO: 29.1 % (ref 37.5–51)
HCT VFR BLD AUTO: 29.5 % (ref 37.5–51)
HCT VFR BLD AUTO: 31.9 % (ref 37.5–51)
HCT VFR BLD AUTO: 33.7 % (ref 37.5–51)
HDLC SERPL-MCNC: 57 MG/DL (ref 40–60)
HGB BLD-MCNC: 10.8 G/DL (ref 13–17.7)
HGB BLD-MCNC: 11.3 G/DL (ref 13–17.7)
HGB BLD-MCNC: 9.2 G/DL (ref 13–17.7)
HGB BLD-MCNC: 9.3 G/DL (ref 13–17.7)
HGB BLD-MCNC: 9.4 G/DL (ref 13–17.7)
HGB BLD-MCNC: 9.5 G/DL (ref 13–17.7)
HGB BLD-MCNC: 9.6 G/DL (ref 13–17.7)
HGB BLD-MCNC: 9.6 G/DL (ref 13–17.7)
HGB BLD-MCNC: 9.8 G/DL (ref 13–17.7)
HGB UR QL STRIP.AUTO: NEGATIVE
HOLD SPECIMEN: NORMAL
IMM GRANULOCYTES # BLD AUTO: 0.03 10*3/MM3 (ref 0–0.05)
IMM GRANULOCYTES # BLD AUTO: 0.04 10*3/MM3 (ref 0–0.05)
IMM GRANULOCYTES # BLD AUTO: 0.04 10*3/MM3 (ref 0–0.05)
IMM GRANULOCYTES # BLD AUTO: 0.05 10*3/MM3 (ref 0–0.05)
IMM GRANULOCYTES # BLD AUTO: 0.07 10*3/MM3 (ref 0–0.05)
IMM GRANULOCYTES # BLD AUTO: 0.08 10*3/MM3 (ref 0–0.05)
IMM GRANULOCYTES # BLD AUTO: 0.08 10*3/MM3 (ref 0–0.05)
IMM GRANULOCYTES NFR BLD AUTO: 0.3 % (ref 0–0.5)
IMM GRANULOCYTES NFR BLD AUTO: 0.4 % (ref 0–0.5)
IMM GRANULOCYTES NFR BLD AUTO: 0.5 % (ref 0–0.5)
IMM GRANULOCYTES NFR BLD AUTO: 0.6 % (ref 0–0.5)
IMM GRANULOCYTES NFR BLD AUTO: 0.7 % (ref 0–0.5)
INR PPP: 0.98 (ref 0.8–1.2)
KETONES UR QL STRIP: ABNORMAL
LARGE PLATELETS: NORMAL
LDLC SERPL CALC-MCNC: 84 MG/DL (ref 0–100)
LDLC/HDLC SERPL: 1.48 {RATIO}
LEUKOCYTE ESTERASE UR QL STRIP.AUTO: NEGATIVE
LV EF 2D ECHO EST: 25 %
LYMPHOCYTES # BLD AUTO: 0.85 10*3/MM3 (ref 0.7–3.1)
LYMPHOCYTES # BLD AUTO: 1.13 10*3/MM3 (ref 0.7–3.1)
LYMPHOCYTES # BLD AUTO: 1.61 10*3/MM3 (ref 0.7–3.1)
LYMPHOCYTES # BLD AUTO: 1.67 10*3/MM3 (ref 0.7–3.1)
LYMPHOCYTES # BLD AUTO: 1.76 10*3/MM3 (ref 0.7–3.1)
LYMPHOCYTES # BLD AUTO: 1.78 10*3/MM3 (ref 0.7–3.1)
LYMPHOCYTES # BLD AUTO: 2.05 10*3/MM3 (ref 0.7–3.1)
LYMPHOCYTES # BLD AUTO: 2.13 10*3/MM3 (ref 0.7–3.1)
LYMPHOCYTES # BLD AUTO: 2.29 10*3/MM3 (ref 0.7–3.1)
LYMPHOCYTES NFR BLD AUTO: 10.3 % (ref 19.6–45.3)
LYMPHOCYTES NFR BLD AUTO: 12.6 % (ref 19.6–45.3)
LYMPHOCYTES NFR BLD AUTO: 14.3 % (ref 19.6–45.3)
LYMPHOCYTES NFR BLD AUTO: 15.5 % (ref 19.6–45.3)
LYMPHOCYTES NFR BLD AUTO: 15.9 % (ref 19.6–45.3)
LYMPHOCYTES NFR BLD AUTO: 19.3 % (ref 19.6–45.3)
LYMPHOCYTES NFR BLD AUTO: 34.4 % (ref 19.6–45.3)
LYMPHOCYTES NFR BLD AUTO: 8.3 % (ref 19.6–45.3)
LYMPHOCYTES NFR BLD AUTO: 8.3 % (ref 19.6–45.3)
MAGNESIUM SERPL-MCNC: 1.2 MG/DL (ref 1.6–2.4)
MAGNESIUM SERPL-MCNC: 1.9 MG/DL (ref 1.6–2.4)
MAXIMAL PREDICTED HEART RATE: 137 BPM
MCH RBC QN AUTO: 28.2 PG (ref 26.6–33)
MCH RBC QN AUTO: 28.4 PG (ref 26.6–33)
MCH RBC QN AUTO: 28.4 PG (ref 26.6–33)
MCH RBC QN AUTO: 28.7 PG (ref 26.6–33)
MCH RBC QN AUTO: 28.8 PG (ref 26.6–33)
MCH RBC QN AUTO: 29 PG (ref 26.6–33)
MCH RBC QN AUTO: 29 PG (ref 26.6–33)
MCH RBC QN AUTO: 29.2 PG (ref 26.6–33)
MCH RBC QN AUTO: 30.2 PG (ref 26.6–33)
MCHC RBC AUTO-ENTMCNC: 32.2 G/DL (ref 31.5–35.7)
MCHC RBC AUTO-ENTMCNC: 33 G/DL (ref 31.5–35.7)
MCHC RBC AUTO-ENTMCNC: 33.4 G/DL (ref 31.5–35.7)
MCHC RBC AUTO-ENTMCNC: 33.5 G/DL (ref 31.5–35.7)
MCHC RBC AUTO-ENTMCNC: 33.6 G/DL (ref 31.5–35.7)
MCHC RBC AUTO-ENTMCNC: 33.7 G/DL (ref 31.5–35.7)
MCHC RBC AUTO-ENTMCNC: 33.9 G/DL (ref 31.5–35.7)
MCHC RBC AUTO-ENTMCNC: 34.4 G/DL (ref 31.5–35.7)
MCHC RBC AUTO-ENTMCNC: 36.2 G/DL (ref 31.5–35.7)
MCV RBC AUTO: 83.4 FL (ref 79–97)
MCV RBC AUTO: 84 FL (ref 79–97)
MCV RBC AUTO: 84.3 FL (ref 79–97)
MCV RBC AUTO: 85.5 FL (ref 79–97)
MCV RBC AUTO: 85.9 FL (ref 79–97)
MCV RBC AUTO: 85.9 FL (ref 79–97)
MCV RBC AUTO: 86.1 FL (ref 79–97)
MCV RBC AUTO: 86.6 FL (ref 79–97)
MCV RBC AUTO: 88.3 FL (ref 79–97)
MONOCYTES # BLD AUTO: 0.53 10*3/MM3 (ref 0.1–0.9)
MONOCYTES # BLD AUTO: 0.68 10*3/MM3 (ref 0.1–0.9)
MONOCYTES # BLD AUTO: 0.72 10*3/MM3 (ref 0.1–0.9)
MONOCYTES # BLD AUTO: 0.72 10*3/MM3 (ref 0.1–0.9)
MONOCYTES # BLD AUTO: 1.06 10*3/MM3 (ref 0.1–0.9)
MONOCYTES # BLD AUTO: 1.33 10*3/MM3 (ref 0.1–0.9)
MONOCYTES # BLD AUTO: 1.33 10*3/MM3 (ref 0.1–0.9)
MONOCYTES # BLD AUTO: 1.44 10*3/MM3 (ref 0.1–0.9)
MONOCYTES # BLD AUTO: 1.84 10*3/MM3 (ref 0.1–0.9)
MONOCYTES NFR BLD AUTO: 10.1 % (ref 5–12)
MONOCYTES NFR BLD AUTO: 10.7 % (ref 5–12)
MONOCYTES NFR BLD AUTO: 10.8 % (ref 5–12)
MONOCYTES NFR BLD AUTO: 12.1 % (ref 5–12)
MONOCYTES NFR BLD AUTO: 12.9 % (ref 5–12)
MONOCYTES NFR BLD AUTO: 3.9 % (ref 5–12)
MONOCYTES NFR BLD AUTO: 5.3 % (ref 5–12)
MONOCYTES NFR BLD AUTO: 7 % (ref 5–12)
MONOCYTES NFR BLD AUTO: 9.1 % (ref 5–12)
NEUTROPHILS NFR BLD AUTO: 10.37 10*3/MM3 (ref 1.7–7)
NEUTROPHILS NFR BLD AUTO: 11.88 10*3/MM3 (ref 1.7–7)
NEUTROPHILS NFR BLD AUTO: 13.45 10*3/MM3 (ref 1.7–7)
NEUTROPHILS NFR BLD AUTO: 3.52 10*3/MM3 (ref 1.7–7)
NEUTROPHILS NFR BLD AUTO: 52.8 % (ref 42.7–76)
NEUTROPHILS NFR BLD AUTO: 67.7 % (ref 42.7–76)
NEUTROPHILS NFR BLD AUTO: 7.46 10*3/MM3 (ref 1.7–7)
NEUTROPHILS NFR BLD AUTO: 7.89 10*3/MM3 (ref 1.7–7)
NEUTROPHILS NFR BLD AUTO: 70.6 % (ref 42.7–76)
NEUTROPHILS NFR BLD AUTO: 73.7 % (ref 42.7–76)
NEUTROPHILS NFR BLD AUTO: 75.9 % (ref 42.7–76)
NEUTROPHILS NFR BLD AUTO: 78.4 % (ref 42.7–76)
NEUTROPHILS NFR BLD AUTO: 8.58 10*3/MM3 (ref 1.7–7)
NEUTROPHILS NFR BLD AUTO: 8.9 10*3/MM3 (ref 1.7–7)
NEUTROPHILS NFR BLD AUTO: 81.4 % (ref 42.7–76)
NEUTROPHILS NFR BLD AUTO: 83.8 % (ref 42.7–76)
NEUTROPHILS NFR BLD AUTO: 87.1 % (ref 42.7–76)
NEUTROPHILS NFR BLD AUTO: 9.74 10*3/MM3 (ref 1.7–7)
NITRITE UR QL STRIP: NEGATIVE
NRBC BLD AUTO-RTO: 0 /100 WBC (ref 0–0.2)
NT-PROBNP SERPL-MCNC: ABNORMAL PG/ML (ref 0–1800)
PH UR STRIP.AUTO: 6 [PH] (ref 5–9)
PLATELET # BLD AUTO: 118 10*3/MM3 (ref 140–450)
PLATELET # BLD AUTO: 139 10*3/MM3 (ref 140–450)
PLATELET # BLD AUTO: 144 10*3/MM3 (ref 140–450)
PLATELET # BLD AUTO: 182 10*3/MM3 (ref 140–450)
PLATELET # BLD AUTO: 191 10*3/MM3 (ref 140–450)
PLATELET # BLD AUTO: 194 10*3/MM3 (ref 140–450)
PLATELET # BLD AUTO: 229 10*3/MM3 (ref 140–450)
PLATELET # BLD AUTO: 231 10*3/MM3 (ref 140–450)
PLATELET # BLD AUTO: 252 10*3/MM3 (ref 140–450)
PMV BLD AUTO: 12.4 FL (ref 6–12)
PMV BLD AUTO: 12.5 FL (ref 6–12)
PMV BLD AUTO: 12.6 FL (ref 6–12)
PMV BLD AUTO: 12.7 FL (ref 6–12)
PMV BLD AUTO: 12.7 FL (ref 6–12)
PMV BLD AUTO: 13.1 FL (ref 6–12)
PMV BLD AUTO: 13.2 FL (ref 6–12)
PMV BLD AUTO: 13.5 FL (ref 6–12)
PMV BLD AUTO: 14.1 FL (ref 6–12)
POTASSIUM SERPL-SCNC: 3.7 MMOL/L (ref 3.5–5.2)
POTASSIUM SERPL-SCNC: 3.8 MMOL/L (ref 3.5–5.2)
POTASSIUM SERPL-SCNC: 3.8 MMOL/L (ref 3.5–5.2)
POTASSIUM SERPL-SCNC: 3.9 MMOL/L (ref 3.5–5.2)
POTASSIUM SERPL-SCNC: 3.9 MMOL/L (ref 3.5–5.2)
POTASSIUM SERPL-SCNC: 4 MMOL/L (ref 3.5–5.2)
POTASSIUM SERPL-SCNC: 4.2 MMOL/L (ref 3.5–5.2)
POTASSIUM SERPL-SCNC: 4.5 MMOL/L (ref 3.5–5.2)
POTASSIUM SERPL-SCNC: 4.6 MMOL/L (ref 3.5–5.2)
PROT SERPL-MCNC: 6.6 G/DL (ref 6–8.5)
PROT SERPL-MCNC: 6.7 G/DL (ref 6–8.5)
PROT UR QL STRIP: NEGATIVE
PROTHROMBIN TIME: 13.4 SECONDS (ref 11.1–15.3)
QT INTERVAL: 344 MS
QT INTERVAL: 374 MS
QT INTERVAL: 386 MS
QTC INTERVAL: 474 MS
QTC INTERVAL: 474 MS
QTC INTERVAL: 519 MS
RBC # BLD AUTO: 3.08 10*6/MM3 (ref 4.14–5.8)
RBC # BLD AUTO: 3.24 10*6/MM3 (ref 4.14–5.8)
RBC # BLD AUTO: 3.26 10*6/MM3 (ref 4.14–5.8)
RBC # BLD AUTO: 3.31 10*6/MM3 (ref 4.14–5.8)
RBC # BLD AUTO: 3.34 10*6/MM3 (ref 4.14–5.8)
RBC # BLD AUTO: 3.34 10*6/MM3 (ref 4.14–5.8)
RBC # BLD AUTO: 3.36 10*6/MM3 (ref 4.14–5.8)
RBC # BLD AUTO: 3.73 10*6/MM3 (ref 4.14–5.8)
RBC # BLD AUTO: 4.01 10*6/MM3 (ref 4.14–5.8)
RBC MORPH BLD: NORMAL
RBC MORPH BLD: NORMAL
SARS-COV-2 RNA RESP QL NAA+PROBE: NOT DETECTED
SMALL PLATELETS BLD QL SMEAR: ADEQUATE
SMALL PLATELETS BLD QL SMEAR: ADEQUATE
SODIUM SERPL-SCNC: 131 MMOL/L (ref 136–145)
SODIUM SERPL-SCNC: 132 MMOL/L (ref 136–145)
SODIUM SERPL-SCNC: 133 MMOL/L (ref 136–145)
SODIUM SERPL-SCNC: 133 MMOL/L (ref 136–145)
SODIUM SERPL-SCNC: 134 MMOL/L (ref 136–145)
SODIUM SERPL-SCNC: 134 MMOL/L (ref 136–145)
SODIUM SERPL-SCNC: 136 MMOL/L (ref 136–145)
SODIUM SERPL-SCNC: 137 MMOL/L (ref 136–145)
SODIUM SERPL-SCNC: 138 MMOL/L (ref 136–145)
SP GR UR STRIP: 1.02 (ref 1–1.03)
STRESS TARGET HR: 116 BPM
TRIGL SERPL-MCNC: 53 MG/DL (ref 0–150)
TROPONIN T SERPL-MCNC: 0.72 NG/ML (ref 0–0.03)
TROPONIN T SERPL-MCNC: 1.84 NG/ML (ref 0–0.03)
TROPONIN T SERPL-MCNC: 1.93 NG/ML (ref 0–0.03)
UROBILINOGEN UR QL STRIP: ABNORMAL
VLDLC SERPL-MCNC: 11 MG/DL (ref 5–40)
WBC # BLD AUTO: 10.24 10*3/MM3 (ref 3.4–10.8)
WBC # BLD AUTO: 11.01 10*3/MM3 (ref 3.4–10.8)
WBC # BLD AUTO: 11.17 10*3/MM3 (ref 3.4–10.8)
WBC # BLD AUTO: 11.71 10*3/MM3 (ref 3.4–10.8)
WBC # BLD AUTO: 12.75 10*3/MM3 (ref 3.4–10.8)
WBC # BLD AUTO: 13.22 10*3/MM3 (ref 3.4–10.8)
WBC # BLD AUTO: 13.63 10*3/MM3 (ref 3.4–10.8)
WBC # BLD AUTO: 17.16 10*3/MM3 (ref 3.4–10.8)
WBC # BLD AUTO: 6.66 10*3/MM3 (ref 3.4–10.8)
WBC MORPH BLD: NORMAL
WBC MORPH BLD: NORMAL
WHOLE BLOOD HOLD SPECIMEN: NORMAL
WHOLE BLOOD HOLD SPECIMEN: NORMAL

## 2021-01-01 PROCEDURE — 82962 GLUCOSE BLOOD TEST: CPT

## 2021-01-01 PROCEDURE — C1894 INTRO/SHEATH, NON-LASER: HCPCS | Performed by: INTERNAL MEDICINE

## 2021-01-01 PROCEDURE — 25010000002 FUROSEMIDE PER 20 MG: Performed by: NURSE PRACTITIONER

## 2021-01-01 PROCEDURE — 93010 ELECTROCARDIOGRAM REPORT: CPT | Performed by: INTERNAL MEDICINE

## 2021-01-01 PROCEDURE — 99223 1ST HOSP IP/OBS HIGH 75: CPT | Performed by: NURSE PRACTITIONER

## 2021-01-01 PROCEDURE — 92526 ORAL FUNCTION THERAPY: CPT | Performed by: SPEECH-LANGUAGE PATHOLOGIST

## 2021-01-01 PROCEDURE — 25010000002 CEFTRIAXONE: Performed by: INTERNAL MEDICINE

## 2021-01-01 PROCEDURE — 25010000002 HEPARIN (PORCINE) PER 1000 UNITS: Performed by: HOSPITALIST

## 2021-01-01 PROCEDURE — 80048 BASIC METABOLIC PNL TOTAL CA: CPT | Performed by: INTERNAL MEDICINE

## 2021-01-01 PROCEDURE — 83036 HEMOGLOBIN GLYCOSYLATED A1C: CPT | Performed by: INTERNAL MEDICINE

## 2021-01-01 PROCEDURE — 25010000002 METHYLPREDNISOLONE PER 40 MG: Performed by: NURSE PRACTITIONER

## 2021-01-01 PROCEDURE — 87040 BLOOD CULTURE FOR BACTERIA: CPT | Performed by: INTERNAL MEDICINE

## 2021-01-01 PROCEDURE — 25010000002 HEPARIN (PORCINE) PER 1000 UNITS: Performed by: INTERNAL MEDICINE

## 2021-01-01 PROCEDURE — 25010000002 MAGNESIUM SULFATE 2 GM/50ML SOLUTION: Performed by: INTERNAL MEDICINE

## 2021-01-01 PROCEDURE — 93306 TTE W/DOPPLER COMPLETE: CPT

## 2021-01-01 PROCEDURE — 93005 ELECTROCARDIOGRAM TRACING: CPT | Performed by: INTERNAL MEDICINE

## 2021-01-01 PROCEDURE — 94799 UNLISTED PULMONARY SVC/PX: CPT

## 2021-01-01 PROCEDURE — 85025 COMPLETE CBC W/AUTO DIFF WBC: CPT | Performed by: FAMILY MEDICINE

## 2021-01-01 PROCEDURE — 80053 COMPREHEN METABOLIC PANEL: CPT | Performed by: FAMILY MEDICINE

## 2021-01-01 PROCEDURE — 25010000002 MORPHINE PER 10 MG: Performed by: INTERNAL MEDICINE

## 2021-01-01 PROCEDURE — 0001A: CPT | Performed by: THORACIC SURGERY (CARDIOTHORACIC VASCULAR SURGERY)

## 2021-01-01 PROCEDURE — G0378 HOSPITAL OBSERVATION PER HR: HCPCS

## 2021-01-01 PROCEDURE — 83880 ASSAY OF NATRIURETIC PEPTIDE: CPT | Performed by: FAMILY MEDICINE

## 2021-01-01 PROCEDURE — 92610 EVALUATE SWALLOWING FUNCTION: CPT | Performed by: SPEECH-LANGUAGE PATHOLOGIST

## 2021-01-01 PROCEDURE — 25010000002 FUROSEMIDE PER 20 MG: Performed by: INTERNAL MEDICINE

## 2021-01-01 PROCEDURE — 71045 X-RAY EXAM CHEST 1 VIEW: CPT

## 2021-01-01 PROCEDURE — 85007 BL SMEAR W/DIFF WBC COUNT: CPT | Performed by: INTERNAL MEDICINE

## 2021-01-01 PROCEDURE — 25010000002 PROCHLORPERAZINE 10 MG/2ML SOLUTION: Performed by: INTERNAL MEDICINE

## 2021-01-01 PROCEDURE — 25010000002 DIGOXIN PER 500 MCG: Performed by: INTERNAL MEDICINE

## 2021-01-01 PROCEDURE — 63710000001 INSULIN ASPART PER 5 UNITS: Performed by: INTERNAL MEDICINE

## 2021-01-01 PROCEDURE — 83735 ASSAY OF MAGNESIUM: CPT | Performed by: INTERNAL MEDICINE

## 2021-01-01 PROCEDURE — 97166 OT EVAL MOD COMPLEX 45 MIN: CPT

## 2021-01-01 PROCEDURE — 5A1223Z PERFORMANCE OF CARDIAC PACING, CONTINUOUS: ICD-10-PCS | Performed by: INTERNAL MEDICINE

## 2021-01-01 PROCEDURE — 25010000002 CEFTRIAXONE PER 250 MG: Performed by: INTERNAL MEDICINE

## 2021-01-01 PROCEDURE — 92950 HEART/LUNG RESUSCITATION CPR: CPT | Performed by: INTERNAL MEDICINE

## 2021-01-01 PROCEDURE — 93454 CORONARY ARTERY ANGIO S&I: CPT | Performed by: INTERNAL MEDICINE

## 2021-01-01 PROCEDURE — 94760 N-INVAS EAR/PLS OXIMETRY 1: CPT

## 2021-01-01 PROCEDURE — 99233 SBSQ HOSP IP/OBS HIGH 50: CPT | Performed by: INTERNAL MEDICINE

## 2021-01-01 PROCEDURE — 97162 PT EVAL MOD COMPLEX 30 MIN: CPT

## 2021-01-01 PROCEDURE — 85025 COMPLETE CBC W/AUTO DIFF WBC: CPT | Performed by: INTERNAL MEDICINE

## 2021-01-01 PROCEDURE — C1769 GUIDE WIRE: HCPCS | Performed by: INTERNAL MEDICINE

## 2021-01-01 PROCEDURE — 83735 ASSAY OF MAGNESIUM: CPT | Performed by: NURSE PRACTITIONER

## 2021-01-01 PROCEDURE — 91300 HC SARSCOV02 VAC 30MCG/0.3ML IM: CPT | Performed by: THORACIC SURGERY (CARDIOTHORACIC VASCULAR SURGERY)

## 2021-01-01 PROCEDURE — 84484 ASSAY OF TROPONIN QUANT: CPT | Performed by: FAMILY MEDICINE

## 2021-01-01 PROCEDURE — 97535 SELF CARE MNGMENT TRAINING: CPT

## 2021-01-01 PROCEDURE — 25010000002 ATROPINE PER 0.01 MG: Performed by: INTERNAL MEDICINE

## 2021-01-01 PROCEDURE — 85730 THROMBOPLASTIN TIME PARTIAL: CPT | Performed by: INTERNAL MEDICINE

## 2021-01-01 PROCEDURE — 99232 SBSQ HOSP IP/OBS MODERATE 35: CPT | Performed by: INTERNAL MEDICINE

## 2021-01-01 PROCEDURE — 94640 AIRWAY INHALATION TREATMENT: CPT

## 2021-01-01 PROCEDURE — 36415 COLL VENOUS BLD VENIPUNCTURE: CPT | Performed by: INTERNAL MEDICINE

## 2021-01-01 PROCEDURE — 25010000002 EPINEPHRINE 1 MG/ML SOLUTION: Performed by: INTERNAL MEDICINE

## 2021-01-01 PROCEDURE — 84484 ASSAY OF TROPONIN QUANT: CPT | Performed by: NURSE PRACTITIONER

## 2021-01-01 PROCEDURE — 4A023N7 MEASUREMENT OF CARDIAC SAMPLING AND PRESSURE, LEFT HEART, PERCUTANEOUS APPROACH: ICD-10-PCS | Performed by: INTERNAL MEDICINE

## 2021-01-01 PROCEDURE — 97116 GAIT TRAINING THERAPY: CPT

## 2021-01-01 PROCEDURE — 0 IOPAMIDOL PER 1 ML: Performed by: INTERNAL MEDICINE

## 2021-01-01 PROCEDURE — 97530 THERAPEUTIC ACTIVITIES: CPT

## 2021-01-01 PROCEDURE — 84484 ASSAY OF TROPONIN QUANT: CPT | Performed by: INTERNAL MEDICINE

## 2021-01-01 PROCEDURE — 93306 TTE W/DOPPLER COMPLETE: CPT | Performed by: INTERNAL MEDICINE

## 2021-01-01 PROCEDURE — 93005 ELECTROCARDIOGRAM TRACING: CPT | Performed by: FAMILY MEDICINE

## 2021-01-01 PROCEDURE — 92960 CARDIOVERSION ELECTRIC EXT: CPT | Performed by: INTERNAL MEDICINE

## 2021-01-01 PROCEDURE — 85610 PROTHROMBIN TIME: CPT | Performed by: INTERNAL MEDICINE

## 2021-01-01 PROCEDURE — 83605 ASSAY OF LACTIC ACID: CPT | Performed by: FAMILY MEDICINE

## 2021-01-01 PROCEDURE — 80053 COMPREHEN METABOLIC PANEL: CPT | Performed by: INTERNAL MEDICINE

## 2021-01-01 PROCEDURE — 0002A: CPT | Performed by: THORACIC SURGERY (CARDIOTHORACIC VASCULAR SURGERY)

## 2021-01-01 PROCEDURE — 25010000002 PIPERACILLIN SOD-TAZOBACTAM PER 1 G: Performed by: FAMILY MEDICINE

## 2021-01-01 PROCEDURE — 87636 SARSCOV2 & INF A&B AMP PRB: CPT | Performed by: FAMILY MEDICINE

## 2021-01-01 PROCEDURE — 80061 LIPID PANEL: CPT | Performed by: INTERNAL MEDICINE

## 2021-01-01 PROCEDURE — 99204 OFFICE O/P NEW MOD 45 MIN: CPT | Performed by: INTERNAL MEDICINE

## 2021-01-01 PROCEDURE — 99284 EMERGENCY DEPT VISIT MOD MDM: CPT

## 2021-01-01 PROCEDURE — 25010000002 LORAZEPAM PER 2 MG: Performed by: INTERNAL MEDICINE

## 2021-01-01 PROCEDURE — B2111ZZ FLUOROSCOPY OF MULTIPLE CORONARY ARTERIES USING LOW OSMOLAR CONTRAST: ICD-10-PCS | Performed by: INTERNAL MEDICINE

## 2021-01-01 PROCEDURE — 99215 OFFICE O/P EST HI 40 MIN: CPT | Performed by: INTERNAL MEDICINE

## 2021-01-01 PROCEDURE — 81003 URINALYSIS AUTO W/O SCOPE: CPT | Performed by: NURSE PRACTITIONER

## 2021-01-01 RX ORDER — FUROSEMIDE 10 MG/ML
20 INJECTION INTRAMUSCULAR; INTRAVENOUS EVERY 12 HOURS SCHEDULED
Status: DISCONTINUED | OUTPATIENT
Start: 2021-01-01 | End: 2021-01-01

## 2021-01-01 RX ORDER — HYDROCODONE BITARTRATE AND ACETAMINOPHEN 10; 325 MG/1; MG/1
1 TABLET ORAL EVERY 6 HOURS PRN
Status: DISCONTINUED | OUTPATIENT
Start: 2021-01-01 | End: 2021-01-01

## 2021-01-01 RX ORDER — NICOTINE POLACRILEX 4 MG
15 LOZENGE BUCCAL
Status: DISCONTINUED | OUTPATIENT
Start: 2021-01-01 | End: 2021-01-01 | Stop reason: HOSPADM

## 2021-01-01 RX ORDER — TAMSULOSIN HYDROCHLORIDE 0.4 MG/1
0.4 CAPSULE ORAL NIGHTLY
Status: DISCONTINUED | OUTPATIENT
Start: 2021-01-01 | End: 2021-01-01 | Stop reason: HOSPADM

## 2021-01-01 RX ORDER — ROSUVASTATIN CALCIUM 10 MG/1
10 TABLET, COATED ORAL NIGHTLY
Status: DISCONTINUED | OUTPATIENT
Start: 2021-01-01 | End: 2021-01-01 | Stop reason: HOSPADM

## 2021-01-01 RX ORDER — ACETAMINOPHEN 325 MG/1
650 TABLET ORAL EVERY 4 HOURS PRN
Status: DISCONTINUED | OUTPATIENT
Start: 2021-01-01 | End: 2021-01-01 | Stop reason: HOSPADM

## 2021-01-01 RX ORDER — MAGNESIUM SULFATE HEPTAHYDRATE 40 MG/ML
2 INJECTION, SOLUTION INTRAVENOUS ONCE
Status: COMPLETED | OUTPATIENT
Start: 2021-01-01 | End: 2021-01-01

## 2021-01-01 RX ORDER — PROMETHAZINE HYDROCHLORIDE 25 MG/1
25 TABLET ORAL EVERY 6 HOURS PRN
COMMUNITY
Start: 2021-01-01 | End: 2021-06-13

## 2021-01-01 RX ORDER — METHYLPREDNISOLONE SODIUM SUCCINATE 40 MG/ML
40 INJECTION, POWDER, LYOPHILIZED, FOR SOLUTION INTRAMUSCULAR; INTRAVENOUS EVERY 8 HOURS
Status: DISCONTINUED | OUTPATIENT
Start: 2021-01-01 | End: 2021-01-01 | Stop reason: HOSPADM

## 2021-01-01 RX ORDER — OXYCODONE AND ACETAMINOPHEN 10; 325 MG/1; MG/1
1 TABLET ORAL EVERY 6 HOURS PRN
COMMUNITY
Start: 2021-01-01 | End: 2021-06-09

## 2021-01-01 RX ORDER — FUROSEMIDE 10 MG/ML
40 INJECTION INTRAMUSCULAR; INTRAVENOUS EVERY 12 HOURS
Status: DISCONTINUED | OUTPATIENT
Start: 2021-01-01 | End: 2021-01-01

## 2021-01-01 RX ORDER — PEN NEEDLE, DIABETIC 32GX 5/32"
NEEDLE, DISPOSABLE MISCELLANEOUS
COMMUNITY
Start: 2021-01-01 | End: 2021-07-22

## 2021-01-01 RX ORDER — RANOLAZINE 500 MG/1
500 TABLET, EXTENDED RELEASE ORAL EVERY 12 HOURS SCHEDULED
Status: DISCONTINUED | OUTPATIENT
Start: 2021-01-01 | End: 2021-01-01 | Stop reason: HOSPADM

## 2021-01-01 RX ORDER — EPINEPHRINE 1 MG/ML
INJECTION, SOLUTION, CONCENTRATE INTRAVENOUS AS NEEDED
Status: DISCONTINUED | OUTPATIENT
Start: 2021-01-01 | End: 2021-01-01 | Stop reason: HOSPADM

## 2021-01-01 RX ORDER — PEN NEEDLE, DIABETIC 32GX 5/32"
NEEDLE, DISPOSABLE MISCELLANEOUS
COMMUNITY
Start: 2021-01-01

## 2021-01-01 RX ORDER — IPRATROPIUM BROMIDE AND ALBUTEROL SULFATE 2.5; .5 MG/3ML; MG/3ML
3 SOLUTION RESPIRATORY (INHALATION)
Status: DISCONTINUED | OUTPATIENT
Start: 2021-01-01 | End: 2021-01-01 | Stop reason: HOSPADM

## 2021-01-01 RX ORDER — LOSARTAN POTASSIUM 25 MG/1
25 TABLET ORAL
Qty: 30 TABLET | Refills: 0 | Status: SHIPPED | OUTPATIENT
Start: 2021-01-01 | End: 2021-01-01

## 2021-01-01 RX ORDER — ROSUVASTATIN CALCIUM 10 MG/1
10 TABLET, COATED ORAL NIGHTLY
Qty: 30 TABLET | Refills: 0 | Status: SHIPPED | OUTPATIENT
Start: 2021-01-01 | End: 2021-01-01

## 2021-01-01 RX ORDER — SODIUM CHLORIDE 9 MG/ML
INJECTION, SOLUTION INTRAVENOUS
Status: COMPLETED
Start: 2021-01-01 | End: 2021-01-01

## 2021-01-01 RX ORDER — NITROGLYCERIN 0.4 MG/1
0.4 TABLET SUBLINGUAL
Status: DISCONTINUED | OUTPATIENT
Start: 2021-01-01 | End: 2021-01-01 | Stop reason: HOSPADM

## 2021-01-01 RX ORDER — LORAZEPAM 2 MG/ML
0.5 INJECTION INTRAMUSCULAR ONCE
Status: COMPLETED | OUTPATIENT
Start: 2021-01-01 | End: 2021-01-01

## 2021-01-01 RX ORDER — FUROSEMIDE 10 MG/ML
20 INJECTION INTRAMUSCULAR; INTRAVENOUS DAILY
Status: DISCONTINUED | OUTPATIENT
Start: 2021-01-01 | End: 2021-01-01

## 2021-01-01 RX ORDER — SODIUM CHLORIDE 0.9 % (FLUSH) 0.9 %
10 SYRINGE (ML) INJECTION AS NEEDED
Status: DISCONTINUED | OUTPATIENT
Start: 2021-01-01 | End: 2021-01-01 | Stop reason: HOSPADM

## 2021-01-01 RX ORDER — SODIUM CHLORIDE 9 MG/ML
250 INJECTION, SOLUTION INTRAVENOUS ONCE AS NEEDED
Status: DISCONTINUED | OUTPATIENT
Start: 2021-01-01 | End: 2021-01-01 | Stop reason: HOSPADM

## 2021-01-01 RX ORDER — HEPARIN SODIUM 5000 [USP'U]/ML
4000 INJECTION, SOLUTION INTRAVENOUS; SUBCUTANEOUS ONCE
Status: DISCONTINUED | OUTPATIENT
Start: 2021-01-01 | End: 2021-01-01

## 2021-01-01 RX ORDER — FUROSEMIDE 10 MG/ML
20 INJECTION INTRAMUSCULAR; INTRAVENOUS ONCE
Status: COMPLETED | OUTPATIENT
Start: 2021-01-01 | End: 2021-01-01

## 2021-01-01 RX ORDER — DEXTROSE MONOHYDRATE 25 G/50ML
25 INJECTION, SOLUTION INTRAVENOUS
Status: DISCONTINUED | OUTPATIENT
Start: 2021-01-01 | End: 2021-01-01 | Stop reason: HOSPADM

## 2021-01-01 RX ORDER — INSULIN GLARGINE 100 [IU]/ML
INJECTION, SOLUTION SUBCUTANEOUS
COMMUNITY
Start: 2021-01-01

## 2021-01-01 RX ORDER — FUROSEMIDE 10 MG/ML
INJECTION INTRAMUSCULAR; INTRAVENOUS AS NEEDED
Status: DISCONTINUED | OUTPATIENT
Start: 2021-01-01 | End: 2021-01-01 | Stop reason: HOSPADM

## 2021-01-01 RX ORDER — SODIUM CHLORIDE 0.9 % (FLUSH) 0.9 %
10 SYRINGE (ML) INJECTION EVERY 12 HOURS SCHEDULED
Status: DISCONTINUED | OUTPATIENT
Start: 2021-01-01 | End: 2021-01-01 | Stop reason: HOSPADM

## 2021-01-01 RX ORDER — LIDOCAINE HYDROCHLORIDE 20 MG/ML
INJECTION, SOLUTION INFILTRATION; PERINEURAL AS NEEDED
Status: DISCONTINUED | OUTPATIENT
Start: 2021-01-01 | End: 2021-01-01 | Stop reason: HOSPADM

## 2021-01-01 RX ORDER — ATROPINE SULFATE 1 MG/ML
INJECTION, SOLUTION INTRAMUSCULAR; INTRAVENOUS; SUBCUTANEOUS AS NEEDED
Status: DISCONTINUED | OUTPATIENT
Start: 2021-01-01 | End: 2021-01-01 | Stop reason: HOSPADM

## 2021-01-01 RX ORDER — SODIUM CHLORIDE 9 MG/ML
50 INJECTION, SOLUTION INTRAVENOUS CONTINUOUS
Status: DISCONTINUED | OUTPATIENT
Start: 2021-01-01 | End: 2021-01-01

## 2021-01-01 RX ORDER — DIGOXIN 125 MCG
125 TABLET ORAL
Status: DISCONTINUED | OUTPATIENT
Start: 2021-01-01 | End: 2021-01-01 | Stop reason: HOSPADM

## 2021-01-01 RX ORDER — CARVEDILOL 25 MG/1
25 TABLET ORAL 2 TIMES DAILY WITH MEALS
Status: DISCONTINUED | OUTPATIENT
Start: 2021-01-01 | End: 2021-01-01 | Stop reason: HOSPADM

## 2021-01-01 RX ORDER — CARVEDILOL 6.25 MG/1
6.25 TABLET ORAL 2 TIMES DAILY WITH MEALS
Status: DISCONTINUED | OUTPATIENT
Start: 2021-01-01 | End: 2021-01-01

## 2021-01-01 RX ORDER — NALOXONE HCL 0.4 MG/ML
0.4 VIAL (ML) INJECTION
Status: DISCONTINUED | OUTPATIENT
Start: 2021-01-01 | End: 2021-01-01 | Stop reason: HOSPADM

## 2021-01-01 RX ORDER — ASPIRIN 81 MG/1
81 TABLET ORAL DAILY
Qty: 30 TABLET | Refills: 0 | Status: SHIPPED | OUTPATIENT
Start: 2021-01-01

## 2021-01-01 RX ORDER — PROCHLORPERAZINE EDISYLATE 5 MG/ML
5 INJECTION INTRAMUSCULAR; INTRAVENOUS EVERY 6 HOURS PRN
Status: DISCONTINUED | OUTPATIENT
Start: 2021-01-01 | End: 2021-01-01 | Stop reason: HOSPADM

## 2021-01-01 RX ORDER — LOSARTAN POTASSIUM 25 MG/1
25 TABLET ORAL
Qty: 30 TABLET | Refills: 0 | Status: SHIPPED | OUTPATIENT
Start: 2021-01-01

## 2021-01-01 RX ORDER — FUROSEMIDE 20 MG/1
20 TABLET ORAL 2 TIMES DAILY
Qty: 60 TABLET | Refills: 0 | Status: SHIPPED | OUTPATIENT
Start: 2021-01-01 | End: 2021-01-01

## 2021-01-01 RX ORDER — PREDNISONE 20 MG/1
20 TABLET ORAL DAILY
Qty: 5 TABLET | Refills: 0 | Status: SHIPPED | OUTPATIENT
Start: 2021-01-01 | End: 2021-01-01 | Stop reason: SDUPTHER

## 2021-01-01 RX ORDER — HYDROCODONE BITARTRATE AND ACETAMINOPHEN 10; 325 MG/1; MG/1
1 TABLET ORAL EVERY 4 HOURS PRN
Status: DISCONTINUED | OUTPATIENT
Start: 2021-01-01 | End: 2021-01-01 | Stop reason: HOSPADM

## 2021-01-01 RX ORDER — MEGESTROL ACETATE 40 MG/ML
SUSPENSION ORAL
COMMUNITY
Start: 2021-01-01

## 2021-01-01 RX ORDER — FUROSEMIDE 20 MG/1
20 TABLET ORAL 2 TIMES DAILY
Qty: 60 TABLET | Refills: 0 | Status: SHIPPED | OUTPATIENT
Start: 2021-01-01

## 2021-01-01 RX ORDER — PREDNISONE 20 MG/1
20 TABLET ORAL DAILY
Qty: 5 TABLET | Refills: 0 | Status: SHIPPED | OUTPATIENT
Start: 2021-01-01

## 2021-01-01 RX ORDER — DIGOXIN 125 MCG
125 TABLET ORAL
Qty: 30 TABLET | Refills: 0 | Status: SHIPPED | OUTPATIENT
Start: 2021-01-01

## 2021-01-01 RX ORDER — NITROGLYCERIN 0.4 MG/1
0.4 TABLET SUBLINGUAL
Qty: 15 TABLET | Refills: 12 | Status: SHIPPED | OUTPATIENT
Start: 2021-01-01 | End: 2021-01-01

## 2021-01-01 RX ORDER — HEPARIN SODIUM 5000 [USP'U]/ML
5000 INJECTION, SOLUTION INTRAVENOUS; SUBCUTANEOUS EVERY 8 HOURS SCHEDULED
Status: DISCONTINUED | OUTPATIENT
Start: 2021-01-01 | End: 2021-01-01

## 2021-01-01 RX ORDER — FINASTERIDE 5 MG/1
5 TABLET, FILM COATED ORAL DAILY
Status: DISCONTINUED | OUTPATIENT
Start: 2021-01-01 | End: 2021-01-01 | Stop reason: HOSPADM

## 2021-01-01 RX ORDER — DIGOXIN 125 MCG
125 TABLET ORAL
Qty: 30 TABLET | Refills: 0 | Status: SHIPPED | OUTPATIENT
Start: 2021-01-01 | End: 2021-01-01

## 2021-01-01 RX ORDER — CALCIUM CARBONATE 200(500)MG
2 TABLET,CHEWABLE ORAL EVERY 4 HOURS PRN
Status: DISCONTINUED | OUTPATIENT
Start: 2021-01-01 | End: 2021-01-01 | Stop reason: HOSPADM

## 2021-01-01 RX ORDER — FUROSEMIDE 20 MG/1
20 TABLET ORAL
Status: DISCONTINUED | OUTPATIENT
Start: 2021-01-01 | End: 2021-01-01 | Stop reason: HOSPADM

## 2021-01-01 RX ORDER — ACETAMINOPHEN 325 MG/1
650 TABLET ORAL EVERY 4 HOURS PRN
Status: DISCONTINUED | OUTPATIENT
Start: 2021-01-01 | End: 2021-01-01 | Stop reason: SDUPTHER

## 2021-01-01 RX ORDER — ASPIRIN 81 MG/1
81 TABLET ORAL DAILY
Qty: 30 TABLET | Refills: 0 | Status: SHIPPED | OUTPATIENT
Start: 2021-01-01 | End: 2021-01-01

## 2021-01-01 RX ORDER — HEPARIN SODIUM 5000 [USP'U]/ML
4000 INJECTION, SOLUTION INTRAVENOUS; SUBCUTANEOUS AS NEEDED
Status: DISCONTINUED | OUTPATIENT
Start: 2021-01-01 | End: 2021-01-01

## 2021-01-01 RX ORDER — MORPHINE SULFATE 2 MG/ML
2 INJECTION, SOLUTION INTRAMUSCULAR; INTRAVENOUS
Status: DISCONTINUED | OUTPATIENT
Start: 2021-01-01 | End: 2021-01-01 | Stop reason: HOSPADM

## 2021-01-01 RX ORDER — ALBUTEROL SULFATE 90 UG/1
2 AEROSOL, METERED RESPIRATORY (INHALATION) EVERY 4 HOURS PRN
Qty: 8 G | Refills: 0 | Status: SHIPPED | OUTPATIENT
Start: 2021-01-01 | End: 2021-01-01 | Stop reason: SDUPTHER

## 2021-01-01 RX ORDER — HEPARIN SODIUM 5000 [USP'U]/ML
5000 INJECTION, SOLUTION INTRAVENOUS; SUBCUTANEOUS EVERY 8 HOURS SCHEDULED
Status: DISCONTINUED | OUTPATIENT
Start: 2021-01-01 | End: 2021-01-01 | Stop reason: HOSPADM

## 2021-01-01 RX ORDER — ALBUTEROL SULFATE 90 UG/1
2 AEROSOL, METERED RESPIRATORY (INHALATION) EVERY 4 HOURS PRN
Qty: 8 G | Refills: 0 | Status: SHIPPED | OUTPATIENT
Start: 2021-01-01

## 2021-01-01 RX ORDER — RANOLAZINE 500 MG/1
500 TABLET, EXTENDED RELEASE ORAL EVERY 12 HOURS SCHEDULED
Qty: 60 TABLET | Refills: 0 | Status: SHIPPED | OUTPATIENT
Start: 2021-01-01

## 2021-01-01 RX ORDER — CARVEDILOL 12.5 MG/1
12.5 TABLET ORAL 2 TIMES DAILY WITH MEALS
Status: DISCONTINUED | OUTPATIENT
Start: 2021-01-01 | End: 2021-01-01

## 2021-01-01 RX ORDER — CARVEDILOL 25 MG/1
25 TABLET ORAL 2 TIMES DAILY WITH MEALS
Qty: 60 TABLET | Refills: 0 | Status: SHIPPED | OUTPATIENT
Start: 2021-01-01

## 2021-01-01 RX ORDER — RANOLAZINE 500 MG/1
500 TABLET, EXTENDED RELEASE ORAL EVERY 12 HOURS SCHEDULED
Qty: 60 TABLET | Refills: 0 | Status: SHIPPED | OUTPATIENT
Start: 2021-01-01 | End: 2021-01-01

## 2021-01-01 RX ORDER — FUROSEMIDE 10 MG/ML
20 INJECTION INTRAMUSCULAR; INTRAVENOUS EVERY 12 HOURS
Status: DISCONTINUED | OUTPATIENT
Start: 2021-01-01 | End: 2021-01-01

## 2021-01-01 RX ORDER — ROSUVASTATIN CALCIUM 10 MG/1
10 TABLET, COATED ORAL NIGHTLY
Qty: 30 TABLET | Refills: 0 | Status: SHIPPED | OUTPATIENT
Start: 2021-01-01

## 2021-01-01 RX ORDER — HEPARIN SODIUM 10000 [USP'U]/100ML
14 INJECTION, SOLUTION INTRAVENOUS
Status: DISCONTINUED | OUTPATIENT
Start: 2021-01-01 | End: 2021-01-01

## 2021-01-01 RX ORDER — IPRATROPIUM BROMIDE AND ALBUTEROL SULFATE 2.5; .5 MG/3ML; MG/3ML
3 SOLUTION RESPIRATORY (INHALATION) EVERY 4 HOURS PRN
Status: DISCONTINUED | OUTPATIENT
Start: 2021-01-01 | End: 2021-01-01 | Stop reason: HOSPADM

## 2021-01-01 RX ORDER — ASPIRIN 81 MG/1
81 TABLET ORAL DAILY
Status: DISCONTINUED | OUTPATIENT
Start: 2021-01-01 | End: 2021-01-01 | Stop reason: HOSPADM

## 2021-01-01 RX ORDER — HEPARIN SODIUM 5000 [USP'U]/ML
30 INJECTION, SOLUTION INTRAVENOUS; SUBCUTANEOUS AS NEEDED
Status: DISCONTINUED | OUTPATIENT
Start: 2021-01-01 | End: 2021-01-01

## 2021-01-01 RX ORDER — NITROGLYCERIN 0.4 MG/1
0.4 TABLET SUBLINGUAL
Qty: 15 TABLET | Refills: 12 | Status: SHIPPED | OUTPATIENT
Start: 2021-01-01

## 2021-01-01 RX ORDER — LOSARTAN POTASSIUM 25 MG/1
25 TABLET ORAL
Status: DISCONTINUED | OUTPATIENT
Start: 2021-01-01 | End: 2021-01-01 | Stop reason: HOSPADM

## 2021-01-01 RX ORDER — ASPIRIN 81 MG/1
324 TABLET, CHEWABLE ORAL ONCE
Status: COMPLETED | OUTPATIENT
Start: 2021-01-01 | End: 2021-01-01

## 2021-01-01 RX ORDER — DIGOXIN 0.25 MG/ML
250 INJECTION INTRAMUSCULAR; INTRAVENOUS ONCE
Status: COMPLETED | OUTPATIENT
Start: 2021-01-01 | End: 2021-01-01

## 2021-01-01 RX ADMIN — INSULIN ASPART 2 UNITS: 100 INJECTION, SOLUTION INTRAVENOUS; SUBCUTANEOUS at 08:09

## 2021-01-01 RX ADMIN — IPRATROPIUM BROMIDE AND ALBUTEROL SULFATE 3 ML: 2.5; .5 SOLUTION RESPIRATORY (INHALATION) at 08:01

## 2021-01-01 RX ADMIN — ASPIRIN 81 MG: 81 TABLET, COATED ORAL at 08:37

## 2021-01-01 RX ADMIN — DIGOXIN 125 MCG: 125 TABLET ORAL at 11:18

## 2021-01-01 RX ADMIN — CARVEDILOL 25 MG: 25 TABLET, FILM COATED ORAL at 17:29

## 2021-01-01 RX ADMIN — HEPARIN SODIUM 5000 UNITS: 5000 INJECTION INTRAVENOUS; SUBCUTANEOUS at 13:34

## 2021-01-01 RX ADMIN — RANOLAZINE 500 MG: 500 TABLET, FILM COATED, EXTENDED RELEASE ORAL at 20:01

## 2021-01-01 RX ADMIN — IPRATROPIUM BROMIDE AND ALBUTEROL SULFATE 3 ML: 2.5; .5 SOLUTION RESPIRATORY (INHALATION) at 06:29

## 2021-01-01 RX ADMIN — SODIUM CHLORIDE 1000 ML: 900 INJECTION, SOLUTION INTRAVENOUS at 17:19

## 2021-01-01 RX ADMIN — PROCHLORPERAZINE EDISYLATE 5 MG: 5 INJECTION INTRAMUSCULAR; INTRAVENOUS at 05:03

## 2021-01-01 RX ADMIN — DOXYCYCLINE 100 MG: 100 INJECTION, POWDER, LYOPHILIZED, FOR SOLUTION INTRAVENOUS at 19:59

## 2021-01-01 RX ADMIN — INSULIN ASPART 4 UNITS: 100 INJECTION, SOLUTION INTRAVENOUS; SUBCUTANEOUS at 11:25

## 2021-01-01 RX ADMIN — FUROSEMIDE 20 MG: 20 TABLET ORAL at 17:18

## 2021-01-01 RX ADMIN — IPRATROPIUM BROMIDE AND ALBUTEROL SULFATE 3 ML: 2.5; .5 SOLUTION RESPIRATORY (INHALATION) at 11:14

## 2021-01-01 RX ADMIN — FINASTERIDE 5 MG: 5 TABLET, FILM COATED ORAL at 08:10

## 2021-01-01 RX ADMIN — DOXYCYCLINE 100 MG: 100 INJECTION, POWDER, LYOPHILIZED, FOR SOLUTION INTRAVENOUS at 08:10

## 2021-01-01 RX ADMIN — FUROSEMIDE 20 MG: 10 INJECTION, SOLUTION INTRAMUSCULAR; INTRAVENOUS at 18:19

## 2021-01-01 RX ADMIN — CALCIUM CARBONATE (ANTACID) CHEW TAB 500 MG 2 TABLET: 500 CHEW TAB at 20:18

## 2021-01-01 RX ADMIN — INSULIN ASPART 2 UNITS: 100 INJECTION, SOLUTION INTRAVENOUS; SUBCUTANEOUS at 18:45

## 2021-01-01 RX ADMIN — FUROSEMIDE 40 MG: 10 INJECTION, SOLUTION INTRAMUSCULAR; INTRAVENOUS at 20:40

## 2021-01-01 RX ADMIN — CALCIUM CARBONATE (ANTACID) CHEW TAB 500 MG 2 TABLET: 500 CHEW TAB at 09:59

## 2021-01-01 RX ADMIN — HEPARIN SODIUM 5000 UNITS: 5000 INJECTION, SOLUTION INTRAVENOUS; SUBCUTANEOUS at 06:18

## 2021-01-01 RX ADMIN — MORPHINE SULFATE 2 MG: 2 INJECTION, SOLUTION INTRAMUSCULAR; INTRAVENOUS at 06:52

## 2021-01-01 RX ADMIN — HYDROCODONE BITARTRATE AND ACETAMINOPHEN 1 TABLET: 10; 325 TABLET ORAL at 04:44

## 2021-01-01 RX ADMIN — CALCIUM CARBONATE (ANTACID) CHEW TAB 500 MG 2 TABLET: 500 CHEW TAB at 09:46

## 2021-01-01 RX ADMIN — FINASTERIDE 5 MG: 5 TABLET, FILM COATED ORAL at 08:45

## 2021-01-01 RX ADMIN — HYDROCODONE BITARTRATE AND ACETAMINOPHEN 1 TABLET: 10; 325 TABLET ORAL at 15:54

## 2021-01-01 RX ADMIN — INSULIN ASPART 2 UNITS: 100 INJECTION, SOLUTION INTRAVENOUS; SUBCUTANEOUS at 08:03

## 2021-01-01 RX ADMIN — DIGOXIN 125 MCG: 125 TABLET ORAL at 11:24

## 2021-01-01 RX ADMIN — SODIUM CHLORIDE, PRESERVATIVE FREE 10 ML: 5 INJECTION INTRAVENOUS at 08:46

## 2021-01-01 RX ADMIN — HYDROCODONE BITARTRATE AND ACETAMINOPHEN 1 TABLET: 10; 325 TABLET ORAL at 13:27

## 2021-01-01 RX ADMIN — DIGOXIN 125 MCG: 125 TABLET ORAL at 12:10

## 2021-01-01 RX ADMIN — CARVEDILOL 12.5 MG: 12.5 TABLET, FILM COATED ORAL at 17:42

## 2021-01-01 RX ADMIN — ASPIRIN 324 MG: 81 TABLET, CHEWABLE ORAL at 16:41

## 2021-01-01 RX ADMIN — SODIUM CHLORIDE, PRESERVATIVE FREE 10 ML: 5 INJECTION INTRAVENOUS at 20:17

## 2021-01-01 RX ADMIN — INSULIN ASPART 3 UNITS: 100 INJECTION, SOLUTION INTRAVENOUS; SUBCUTANEOUS at 12:10

## 2021-01-01 RX ADMIN — MORPHINE SULFATE 2 MG: 2 INJECTION, SOLUTION INTRAMUSCULAR; INTRAVENOUS at 23:42

## 2021-01-01 RX ADMIN — IPRATROPIUM BROMIDE AND ALBUTEROL SULFATE 3 ML: 2.5; .5 SOLUTION RESPIRATORY (INHALATION) at 23:24

## 2021-01-01 RX ADMIN — HEPARIN SODIUM 12 UNITS/KG/HR: 10000 INJECTION, SOLUTION INTRAVENOUS at 23:32

## 2021-01-01 RX ADMIN — HYDROCODONE BITARTRATE AND ACETAMINOPHEN 1 TABLET: 10; 325 TABLET ORAL at 22:36

## 2021-01-01 RX ADMIN — FINASTERIDE 5 MG: 5 TABLET, FILM COATED ORAL at 08:00

## 2021-01-01 RX ADMIN — ROSUVASTATIN CALCIUM 10 MG: 10 TABLET, FILM COATED ORAL at 20:41

## 2021-01-01 RX ADMIN — SODIUM CHLORIDE, PRESERVATIVE FREE 10 ML: 5 INJECTION INTRAVENOUS at 08:00

## 2021-01-01 RX ADMIN — HYDROCODONE BITARTRATE AND ACETAMINOPHEN 1 TABLET: 10; 325 TABLET ORAL at 01:54

## 2021-01-01 RX ADMIN — FUROSEMIDE 20 MG: 10 INJECTION, SOLUTION INTRAMUSCULAR; INTRAVENOUS at 08:03

## 2021-01-01 RX ADMIN — IPRATROPIUM BROMIDE AND ALBUTEROL SULFATE 3 ML: 2.5; .5 SOLUTION RESPIRATORY (INHALATION) at 12:44

## 2021-01-01 RX ADMIN — DOXYCYCLINE 100 MG: 100 INJECTION, POWDER, LYOPHILIZED, FOR SOLUTION INTRAVENOUS at 19:34

## 2021-01-01 RX ADMIN — HYDROCODONE BITARTRATE AND ACETAMINOPHEN 1 TABLET: 10; 325 TABLET ORAL at 13:34

## 2021-01-01 RX ADMIN — CARVEDILOL 12.5 MG: 12.5 TABLET, FILM COATED ORAL at 08:04

## 2021-01-01 RX ADMIN — CEFTRIAXONE 1 G: 1 INJECTION, POWDER, FOR SOLUTION INTRAMUSCULAR; INTRAVENOUS at 20:16

## 2021-01-01 RX ADMIN — CARVEDILOL 6.25 MG: 6.25 TABLET, FILM COATED ORAL at 07:59

## 2021-01-01 RX ADMIN — HYDROCODONE BITARTRATE AND ACETAMINOPHEN 1 TABLET: 10; 325 TABLET ORAL at 11:31

## 2021-01-01 RX ADMIN — INSULIN ASPART 2 UNITS: 100 INJECTION, SOLUTION INTRAVENOUS; SUBCUTANEOUS at 17:12

## 2021-01-01 RX ADMIN — CARVEDILOL 25 MG: 25 TABLET, FILM COATED ORAL at 08:55

## 2021-01-01 RX ADMIN — SODIUM CHLORIDE, PRESERVATIVE FREE 10 ML: 5 INJECTION INTRAVENOUS at 08:38

## 2021-01-01 RX ADMIN — HEPARIN SODIUM 5000 UNITS: 5000 INJECTION INTRAVENOUS; SUBCUTANEOUS at 05:53

## 2021-01-01 RX ADMIN — METHYLPREDNISOLONE SODIUM SUCCINATE 40 MG: 40 INJECTION, POWDER, FOR SOLUTION INTRAMUSCULAR; INTRAVENOUS at 06:18

## 2021-01-01 RX ADMIN — IPRATROPIUM BROMIDE AND ALBUTEROL SULFATE 3 ML: 2.5; .5 SOLUTION RESPIRATORY (INHALATION) at 07:48

## 2021-01-01 RX ADMIN — ASPIRIN 81 MG: 81 TABLET, COATED ORAL at 08:00

## 2021-01-01 RX ADMIN — FINASTERIDE 5 MG: 5 TABLET, FILM COATED ORAL at 08:55

## 2021-01-01 RX ADMIN — CARVEDILOL 25 MG: 25 TABLET, FILM COATED ORAL at 08:37

## 2021-01-01 RX ADMIN — CARVEDILOL 12.5 MG: 12.5 TABLET, FILM COATED ORAL at 08:10

## 2021-01-01 RX ADMIN — IPRATROPIUM BROMIDE AND ALBUTEROL SULFATE 3 ML: 2.5; .5 SOLUTION RESPIRATORY (INHALATION) at 15:36

## 2021-01-01 RX ADMIN — CEFTRIAXONE 1 G: 1 INJECTION, POWDER, FOR SOLUTION INTRAMUSCULAR; INTRAVENOUS at 21:53

## 2021-01-01 RX ADMIN — MORPHINE SULFATE 2 MG: 2 INJECTION, SOLUTION INTRAMUSCULAR; INTRAVENOUS at 08:09

## 2021-01-01 RX ADMIN — ASPIRIN 81 MG: 81 TABLET, COATED ORAL at 08:55

## 2021-01-01 RX ADMIN — PROCHLORPERAZINE EDISYLATE 5 MG: 5 INJECTION INTRAMUSCULAR; INTRAVENOUS at 11:31

## 2021-01-01 RX ADMIN — HYDROCODONE BITARTRATE AND ACETAMINOPHEN 1 TABLET: 10; 325 TABLET ORAL at 04:58

## 2021-01-01 RX ADMIN — DIGOXIN 250 MCG: 0.25 INJECTION INTRAMUSCULAR; INTRAVENOUS at 20:39

## 2021-01-01 RX ADMIN — CARVEDILOL 12.5 MG: 12.5 TABLET, FILM COATED ORAL at 18:19

## 2021-01-01 RX ADMIN — ASPIRIN 81 MG: 81 TABLET, COATED ORAL at 08:10

## 2021-01-01 RX ADMIN — FUROSEMIDE 20 MG: 20 TABLET ORAL at 08:46

## 2021-01-01 RX ADMIN — INSULIN ASPART 3 UNITS: 100 INJECTION, SOLUTION INTRAVENOUS; SUBCUTANEOUS at 17:14

## 2021-01-01 RX ADMIN — HEPARIN SODIUM 5000 UNITS: 5000 INJECTION, SOLUTION INTRAVENOUS; SUBCUTANEOUS at 21:09

## 2021-01-01 RX ADMIN — SODIUM CHLORIDE, PRESERVATIVE FREE 10 ML: 5 INJECTION INTRAVENOUS at 08:14

## 2021-01-01 RX ADMIN — HEPARIN SODIUM 5000 UNITS: 5000 INJECTION INTRAVENOUS; SUBCUTANEOUS at 20:16

## 2021-01-01 RX ADMIN — CALCIUM CARBONATE (ANTACID) CHEW TAB 500 MG 2 TABLET: 500 CHEW TAB at 05:52

## 2021-01-01 RX ADMIN — HEPARIN SODIUM 5000 UNITS: 5000 INJECTION, SOLUTION INTRAVENOUS; SUBCUTANEOUS at 05:46

## 2021-01-01 RX ADMIN — RANOLAZINE 500 MG: 500 TABLET, FILM COATED, EXTENDED RELEASE ORAL at 20:40

## 2021-01-01 RX ADMIN — ROSUVASTATIN CALCIUM 10 MG: 10 TABLET, FILM COATED ORAL at 20:17

## 2021-01-01 RX ADMIN — SODIUM CHLORIDE, PRESERVATIVE FREE 10 ML: 5 INJECTION INTRAVENOUS at 20:56

## 2021-01-01 RX ADMIN — SODIUM CHLORIDE, PRESERVATIVE FREE 10 ML: 5 INJECTION INTRAVENOUS at 20:41

## 2021-01-01 RX ADMIN — INSULIN ASPART 3 UNITS: 100 INJECTION, SOLUTION INTRAVENOUS; SUBCUTANEOUS at 16:45

## 2021-01-01 RX ADMIN — RANOLAZINE 500 MG: 500 TABLET, FILM COATED, EXTENDED RELEASE ORAL at 20:17

## 2021-01-01 RX ADMIN — HEPARIN SODIUM 5000 UNITS: 5000 INJECTION, SOLUTION INTRAVENOUS; SUBCUTANEOUS at 15:52

## 2021-01-01 RX ADMIN — PROCHLORPERAZINE EDISYLATE 5 MG: 5 INJECTION INTRAMUSCULAR; INTRAVENOUS at 21:58

## 2021-01-01 RX ADMIN — FUROSEMIDE 20 MG: 10 INJECTION, SOLUTION INTRAMUSCULAR; INTRAVENOUS at 08:00

## 2021-01-01 RX ADMIN — HYDROCODONE BITARTRATE AND ACETAMINOPHEN 1 TABLET: 10; 325 TABLET ORAL at 06:17

## 2021-01-01 RX ADMIN — CALCIUM CARBONATE (ANTACID) CHEW TAB 500 MG 2 TABLET: 500 CHEW TAB at 20:59

## 2021-01-01 RX ADMIN — CARVEDILOL 25 MG: 25 TABLET, FILM COATED ORAL at 08:46

## 2021-01-01 RX ADMIN — SODIUM CHLORIDE 50 ML/HR: 9 INJECTION, SOLUTION INTRAVENOUS at 15:08

## 2021-01-01 RX ADMIN — CALCIUM CARBONATE (ANTACID) CHEW TAB 500 MG 2 TABLET: 500 CHEW TAB at 16:28

## 2021-01-01 RX ADMIN — CARVEDILOL 25 MG: 25 TABLET, FILM COATED ORAL at 17:15

## 2021-01-01 RX ADMIN — HEPARIN SODIUM 5000 UNITS: 5000 INJECTION, SOLUTION INTRAVENOUS; SUBCUTANEOUS at 20:01

## 2021-01-01 RX ADMIN — IPRATROPIUM BROMIDE AND ALBUTEROL SULFATE 3 ML: 2.5; .5 SOLUTION RESPIRATORY (INHALATION) at 02:11

## 2021-01-01 RX ADMIN — FUROSEMIDE 20 MG: 10 INJECTION, SOLUTION INTRAMUSCULAR; INTRAVENOUS at 08:10

## 2021-01-01 RX ADMIN — TAMSULOSIN HYDROCHLORIDE 0.4 MG: 0.4 CAPSULE ORAL at 20:17

## 2021-01-01 RX ADMIN — PROCHLORPERAZINE EDISYLATE 5 MG: 5 INJECTION INTRAMUSCULAR; INTRAVENOUS at 20:39

## 2021-01-01 RX ADMIN — LOSARTAN POTASSIUM 25 MG: 25 TABLET, FILM COATED ORAL at 08:55

## 2021-01-01 RX ADMIN — RANOLAZINE 500 MG: 500 TABLET, FILM COATED, EXTENDED RELEASE ORAL at 08:55

## 2021-01-01 RX ADMIN — ASPIRIN 81 MG: 81 TABLET, COATED ORAL at 08:11

## 2021-01-01 RX ADMIN — HYDROCODONE BITARTRATE AND ACETAMINOPHEN 1 TABLET: 10; 325 TABLET ORAL at 08:04

## 2021-01-01 RX ADMIN — HEPARIN SODIUM 5000 UNITS: 5000 INJECTION INTRAVENOUS; SUBCUTANEOUS at 15:53

## 2021-01-01 RX ADMIN — MORPHINE SULFATE 2 MG: 2 INJECTION, SOLUTION INTRAMUSCULAR; INTRAVENOUS at 20:02

## 2021-01-01 RX ADMIN — TAMSULOSIN HYDROCHLORIDE 0.4 MG: 0.4 CAPSULE ORAL at 22:02

## 2021-01-01 RX ADMIN — SODIUM CHLORIDE, PRESERVATIVE FREE 10 ML: 5 INJECTION INTRAVENOUS at 20:01

## 2021-01-01 RX ADMIN — CALCIUM CARBONATE (ANTACID) CHEW TAB 500 MG 2 TABLET: 500 CHEW TAB at 01:24

## 2021-01-01 RX ADMIN — HEPARIN SODIUM 5000 UNITS: 5000 INJECTION, SOLUTION INTRAVENOUS; SUBCUTANEOUS at 21:21

## 2021-01-01 RX ADMIN — CEFTRIAXONE 1 G: 1 INJECTION, POWDER, FOR SOLUTION INTRAMUSCULAR; INTRAVENOUS at 20:40

## 2021-01-01 RX ADMIN — CEFTRIAXONE 1 G: 1 INJECTION, POWDER, FOR SOLUTION INTRAMUSCULAR; INTRAVENOUS at 21:03

## 2021-01-01 RX ADMIN — CARVEDILOL 12.5 MG: 12.5 TABLET, FILM COATED ORAL at 17:58

## 2021-01-01 RX ADMIN — FUROSEMIDE 20 MG: 20 TABLET ORAL at 08:10

## 2021-01-01 RX ADMIN — NITROGLYCERIN 0.4 MG: 0.4 TABLET SUBLINGUAL at 00:29

## 2021-01-01 RX ADMIN — TAMSULOSIN HYDROCHLORIDE 0.4 MG: 0.4 CAPSULE ORAL at 20:16

## 2021-01-01 RX ADMIN — CALCIUM CARBONATE (ANTACID) CHEW TAB 500 MG 2 TABLET: 500 CHEW TAB at 20:00

## 2021-01-01 RX ADMIN — HEPARIN SODIUM 5000 UNITS: 5000 INJECTION, SOLUTION INTRAVENOUS; SUBCUTANEOUS at 13:35

## 2021-01-01 RX ADMIN — FUROSEMIDE 20 MG: 10 INJECTION, SOLUTION INTRAMUSCULAR; INTRAVENOUS at 09:59

## 2021-01-01 RX ADMIN — CEFTRIAXONE 1 G: 1 INJECTION, POWDER, FOR SOLUTION INTRAMUSCULAR; INTRAVENOUS at 20:41

## 2021-01-01 RX ADMIN — FINASTERIDE 5 MG: 5 TABLET, FILM COATED ORAL at 08:37

## 2021-01-01 RX ADMIN — IPRATROPIUM BROMIDE AND ALBUTEROL SULFATE 3 ML: 2.5; .5 SOLUTION RESPIRATORY (INHALATION) at 15:38

## 2021-01-01 RX ADMIN — ASPIRIN 81 MG: 81 TABLET, COATED ORAL at 08:45

## 2021-01-01 RX ADMIN — RANOLAZINE 500 MG: 500 TABLET, FILM COATED, EXTENDED RELEASE ORAL at 08:10

## 2021-01-01 RX ADMIN — INSULIN ASPART 3 UNITS: 100 INJECTION, SOLUTION INTRAVENOUS; SUBCUTANEOUS at 17:28

## 2021-01-01 RX ADMIN — MORPHINE SULFATE 2 MG: 2 INJECTION, SOLUTION INTRAMUSCULAR; INTRAVENOUS at 03:33

## 2021-01-01 RX ADMIN — CARVEDILOL 25 MG: 25 TABLET, FILM COATED ORAL at 08:11

## 2021-01-01 RX ADMIN — IPRATROPIUM BROMIDE AND ALBUTEROL SULFATE 3 ML: 2.5; .5 SOLUTION RESPIRATORY (INHALATION) at 11:16

## 2021-01-01 RX ADMIN — DOXYCYCLINE 100 MG: 100 INJECTION, POWDER, LYOPHILIZED, FOR SOLUTION INTRAVENOUS at 07:22

## 2021-01-01 RX ADMIN — SODIUM CHLORIDE, PRESERVATIVE FREE 10 ML: 5 INJECTION INTRAVENOUS at 20:28

## 2021-01-01 RX ADMIN — INSULIN ASPART 3 UNITS: 100 INJECTION, SOLUTION INTRAVENOUS; SUBCUTANEOUS at 08:14

## 2021-01-01 RX ADMIN — TAMSULOSIN HYDROCHLORIDE 0.4 MG: 0.4 CAPSULE ORAL at 20:01

## 2021-01-01 RX ADMIN — DOXYCYCLINE 100 MG: 100 INJECTION, POWDER, LYOPHILIZED, FOR SOLUTION INTRAVENOUS at 06:58

## 2021-01-01 RX ADMIN — INSULIN ASPART 2 UNITS: 100 INJECTION, SOLUTION INTRAVENOUS; SUBCUTANEOUS at 17:53

## 2021-01-01 RX ADMIN — FUROSEMIDE 20 MG: 20 TABLET ORAL at 17:29

## 2021-01-01 RX ADMIN — INSULIN ASPART 5 UNITS: 100 INJECTION, SOLUTION INTRAVENOUS; SUBCUTANEOUS at 07:22

## 2021-01-01 RX ADMIN — LORAZEPAM 0.5 MG: 2 INJECTION INTRAMUSCULAR; INTRAVENOUS at 02:24

## 2021-01-01 RX ADMIN — FUROSEMIDE 20 MG: 20 TABLET ORAL at 08:55

## 2021-01-01 RX ADMIN — SODIUM CHLORIDE, PRESERVATIVE FREE 10 ML: 5 INJECTION INTRAVENOUS at 08:54

## 2021-01-01 RX ADMIN — METHYLPREDNISOLONE SODIUM SUCCINATE 40 MG: 40 INJECTION, POWDER, FOR SOLUTION INTRAMUSCULAR; INTRAVENOUS at 20:01

## 2021-01-01 RX ADMIN — FUROSEMIDE 20 MG: 10 INJECTION, SOLUTION INTRAMUSCULAR; INTRAVENOUS at 16:14

## 2021-01-01 RX ADMIN — SODIUM CHLORIDE, PRESERVATIVE FREE 10 ML: 5 INJECTION INTRAVENOUS at 21:32

## 2021-01-01 RX ADMIN — TAMSULOSIN HYDROCHLORIDE 0.4 MG: 0.4 CAPSULE ORAL at 20:40

## 2021-01-01 RX ADMIN — FUROSEMIDE 20 MG: 20 TABLET ORAL at 17:15

## 2021-01-01 RX ADMIN — ROSUVASTATIN CALCIUM 10 MG: 10 TABLET, FILM COATED ORAL at 21:27

## 2021-01-01 RX ADMIN — IPRATROPIUM BROMIDE AND ALBUTEROL SULFATE 3 ML: 2.5; .5 SOLUTION RESPIRATORY (INHALATION) at 02:22

## 2021-01-01 RX ADMIN — IPRATROPIUM BROMIDE AND ALBUTEROL SULFATE 3 ML: 2.5; .5 SOLUTION RESPIRATORY (INHALATION) at 19:45

## 2021-01-01 RX ADMIN — CALCIUM CARBONATE (ANTACID) CHEW TAB 500 MG 2 TABLET: 500 CHEW TAB at 15:53

## 2021-01-01 RX ADMIN — MORPHINE SULFATE 2 MG: 2 INJECTION, SOLUTION INTRAMUSCULAR; INTRAVENOUS at 21:58

## 2021-01-01 RX ADMIN — HYDROCODONE BITARTRATE AND ACETAMINOPHEN 1 TABLET: 10; 325 TABLET ORAL at 20:16

## 2021-01-01 RX ADMIN — MORPHINE SULFATE 2 MG: 2 INJECTION, SOLUTION INTRAMUSCULAR; INTRAVENOUS at 16:10

## 2021-01-01 RX ADMIN — HYDROCODONE BITARTRATE AND ACETAMINOPHEN 1 TABLET: 10; 325 TABLET ORAL at 18:31

## 2021-01-01 RX ADMIN — HEPARIN SODIUM 5000 UNITS: 5000 INJECTION, SOLUTION INTRAVENOUS; SUBCUTANEOUS at 14:31

## 2021-01-01 RX ADMIN — CARVEDILOL 25 MG: 25 TABLET, FILM COATED ORAL at 17:18

## 2021-01-01 RX ADMIN — HYDROCODONE BITARTRATE AND ACETAMINOPHEN 1 TABLET: 10; 325 TABLET ORAL at 17:28

## 2021-01-01 RX ADMIN — INSULIN ASPART 3 UNITS: 100 INJECTION, SOLUTION INTRAVENOUS; SUBCUTANEOUS at 12:24

## 2021-01-01 RX ADMIN — HYDROCODONE BITARTRATE AND ACETAMINOPHEN 1 TABLET: 10; 325 TABLET ORAL at 11:17

## 2021-01-01 RX ADMIN — HYDROCODONE BITARTRATE AND ACETAMINOPHEN 1 TABLET: 10; 325 TABLET ORAL at 06:02

## 2021-01-01 RX ADMIN — DOXYCYCLINE 100 MG: 100 INJECTION, POWDER, LYOPHILIZED, FOR SOLUTION INTRAVENOUS at 18:19

## 2021-01-01 RX ADMIN — RANOLAZINE 500 MG: 500 TABLET, FILM COATED, EXTENDED RELEASE ORAL at 08:37

## 2021-01-01 RX ADMIN — DIGOXIN 125 MCG: 125 TABLET ORAL at 11:31

## 2021-01-01 RX ADMIN — FUROSEMIDE 40 MG: 10 INJECTION, SOLUTION INTRAMUSCULAR; INTRAVENOUS at 20:16

## 2021-01-01 RX ADMIN — MAGNESIUM SULFATE HEPTAHYDRATE 2 G: 40 INJECTION, SOLUTION INTRAVENOUS at 20:56

## 2021-01-01 RX ADMIN — PROCHLORPERAZINE EDISYLATE 5 MG: 5 INJECTION INTRAMUSCULAR; INTRAVENOUS at 20:17

## 2021-01-01 RX ADMIN — METHYLPREDNISOLONE SODIUM SUCCINATE 40 MG: 40 INJECTION, POWDER, FOR SOLUTION INTRAMUSCULAR; INTRAVENOUS at 14:31

## 2021-01-01 RX ADMIN — CALCIUM CARBONATE (ANTACID) CHEW TAB 500 MG 2 TABLET: 500 CHEW TAB at 06:02

## 2021-01-01 RX ADMIN — HYDROCODONE BITARTRATE AND ACETAMINOPHEN 1 TABLET: 10; 325 TABLET ORAL at 01:02

## 2021-01-01 RX ADMIN — HYDROCODONE BITARTRATE AND ACETAMINOPHEN 1 TABLET: 10; 325 TABLET ORAL at 23:52

## 2021-01-01 RX ADMIN — DOXYCYCLINE 100 MG: 100 INJECTION, POWDER, LYOPHILIZED, FOR SOLUTION INTRAVENOUS at 18:39

## 2021-01-01 RX ADMIN — FUROSEMIDE 20 MG: 10 INJECTION, SOLUTION INTRAMUSCULAR; INTRAVENOUS at 06:02

## 2021-01-01 RX ADMIN — MORPHINE SULFATE 2 MG: 2 INJECTION, SOLUTION INTRAMUSCULAR; INTRAVENOUS at 03:45

## 2021-01-01 RX ADMIN — TAMSULOSIN HYDROCHLORIDE 0.4 MG: 0.4 CAPSULE ORAL at 21:27

## 2021-01-01 RX ADMIN — IPRATROPIUM BROMIDE AND ALBUTEROL SULFATE 3 ML: 2.5; .5 SOLUTION RESPIRATORY (INHALATION) at 19:27

## 2021-01-01 RX ADMIN — SODIUM CHLORIDE, PRESERVATIVE FREE 10 ML: 5 INJECTION INTRAVENOUS at 21:58

## 2021-01-01 RX ADMIN — PROCHLORPERAZINE EDISYLATE 5 MG: 5 INJECTION INTRAMUSCULAR; INTRAVENOUS at 03:46

## 2021-01-01 RX ADMIN — CEFTRIAXONE 1 G: 1 INJECTION, POWDER, FOR SOLUTION INTRAMUSCULAR; INTRAVENOUS at 20:28

## 2021-01-01 RX ADMIN — INSULIN ASPART 4 UNITS: 100 INJECTION, SOLUTION INTRAVENOUS; SUBCUTANEOUS at 08:47

## 2021-01-01 RX ADMIN — HYDROCODONE BITARTRATE AND ACETAMINOPHEN 1 TABLET: 10; 325 TABLET ORAL at 20:22

## 2021-01-01 RX ADMIN — CEFTRIAXONE 1 G: 1 INJECTION, POWDER, FOR SOLUTION INTRAMUSCULAR; INTRAVENOUS at 22:37

## 2021-01-01 RX ADMIN — IPRATROPIUM BROMIDE AND ALBUTEROL SULFATE 3 ML: 2.5; .5 SOLUTION RESPIRATORY (INHALATION) at 14:58

## 2021-01-01 RX ADMIN — SODIUM CHLORIDE, PRESERVATIVE FREE 10 ML: 5 INJECTION INTRAVENOUS at 08:08

## 2021-01-01 RX ADMIN — METHYLPREDNISOLONE SODIUM SUCCINATE 40 MG: 40 INJECTION, POWDER, FOR SOLUTION INTRAMUSCULAR; INTRAVENOUS at 05:46

## 2021-01-01 RX ADMIN — IPRATROPIUM BROMIDE AND ALBUTEROL SULFATE 3 ML: 2.5; .5 SOLUTION RESPIRATORY (INHALATION) at 13:51

## 2021-01-01 RX ADMIN — MORPHINE SULFATE 2 MG: 2 INJECTION, SOLUTION INTRAMUSCULAR; INTRAVENOUS at 16:28

## 2021-01-01 RX ADMIN — INSULIN ASPART 2 UNITS: 100 INJECTION, SOLUTION INTRAVENOUS; SUBCUTANEOUS at 17:42

## 2021-01-01 RX ADMIN — PROCHLORPERAZINE EDISYLATE 5 MG: 5 INJECTION INTRAMUSCULAR; INTRAVENOUS at 10:49

## 2021-01-01 RX ADMIN — METHYLPREDNISOLONE SODIUM SUCCINATE 40 MG: 40 INJECTION, POWDER, FOR SOLUTION INTRAMUSCULAR; INTRAVENOUS at 21:47

## 2021-01-01 RX ADMIN — HYDROCODONE BITARTRATE AND ACETAMINOPHEN 1 TABLET: 10; 325 TABLET ORAL at 20:17

## 2021-01-01 RX ADMIN — DOXYCYCLINE 100 MG: 100 INJECTION, POWDER, LYOPHILIZED, FOR SOLUTION INTRAVENOUS at 17:58

## 2021-01-01 RX ADMIN — MORPHINE SULFATE 2 MG: 2 INJECTION, SOLUTION INTRAMUSCULAR; INTRAVENOUS at 21:46

## 2021-01-01 RX ADMIN — MORPHINE SULFATE 2 MG: 2 INJECTION, SOLUTION INTRAMUSCULAR; INTRAVENOUS at 20:41

## 2021-01-01 RX ADMIN — DOXYCYCLINE 100 MG: 100 INJECTION, POWDER, LYOPHILIZED, FOR SOLUTION INTRAVENOUS at 05:52

## 2021-01-01 RX ADMIN — IPRATROPIUM BROMIDE AND ALBUTEROL SULFATE 3 ML: 2.5; .5 SOLUTION RESPIRATORY (INHALATION) at 09:51

## 2021-01-01 RX ADMIN — SODIUM CHLORIDE, PRESERVATIVE FREE 10 ML: 5 INJECTION INTRAVENOUS at 08:03

## 2021-01-01 RX ADMIN — LOSARTAN POTASSIUM 25 MG: 25 TABLET, FILM COATED ORAL at 08:37

## 2021-01-01 RX ADMIN — PROCHLORPERAZINE EDISYLATE 5 MG: 5 INJECTION INTRAMUSCULAR; INTRAVENOUS at 01:54

## 2021-01-01 RX ADMIN — LOSARTAN POTASSIUM 25 MG: 25 TABLET, FILM COATED ORAL at 08:46

## 2021-01-01 RX ADMIN — CARVEDILOL 6.25 MG: 6.25 TABLET, FILM COATED ORAL at 18:19

## 2021-01-01 RX ADMIN — INSULIN ASPART 4 UNITS: 100 INJECTION, SOLUTION INTRAVENOUS; SUBCUTANEOUS at 11:31

## 2021-01-01 RX ADMIN — METHYLPREDNISOLONE SODIUM SUCCINATE 40 MG: 40 INJECTION, POWDER, FOR SOLUTION INTRAMUSCULAR; INTRAVENOUS at 13:35

## 2021-01-01 RX ADMIN — MORPHINE SULFATE 2 MG: 2 INJECTION, SOLUTION INTRAMUSCULAR; INTRAVENOUS at 08:56

## 2021-01-01 RX ADMIN — HEPARIN SODIUM 5000 UNITS: 5000 INJECTION INTRAVENOUS; SUBCUTANEOUS at 20:40

## 2021-01-01 RX ADMIN — ROSUVASTATIN CALCIUM 10 MG: 10 TABLET, FILM COATED ORAL at 20:01

## 2021-01-01 RX ADMIN — LOSARTAN POTASSIUM 25 MG: 25 TABLET, FILM COATED ORAL at 08:10

## 2021-01-01 RX ADMIN — FINASTERIDE 5 MG: 5 TABLET, FILM COATED ORAL at 08:04

## 2021-01-01 RX ADMIN — RANOLAZINE 500 MG: 500 TABLET, FILM COATED, EXTENDED RELEASE ORAL at 08:45

## 2021-04-10 PROBLEM — K21.9 GASTROESOPHAGEAL REFLUX DISEASE: Status: ACTIVE | Noted: 2020-01-01

## 2021-04-10 PROBLEM — M54.50 CHRONIC LOW BACK PAIN WITHOUT SCIATICA: Status: ACTIVE | Noted: 2019-07-30

## 2021-04-10 PROBLEM — J18.9 PNEUMONIA DUE TO INFECTIOUS ORGANISM: Status: ACTIVE | Noted: 2021-01-01

## 2021-04-10 PROBLEM — J44.9 CHRONIC OBSTRUCTIVE PULMONARY DISEASE (HCC): Status: ACTIVE | Noted: 2020-01-01

## 2021-04-10 PROBLEM — E11.9 TYPE 2 DIABETES MELLITUS WITHOUT COMPLICATION, WITHOUT LONG-TERM CURRENT USE OF INSULIN (HCC): Status: ACTIVE | Noted: 2019-07-30

## 2021-04-10 PROBLEM — M25.50 MULTIPLE JOINT PAIN: Status: ACTIVE | Noted: 2019-07-30

## 2021-04-10 PROBLEM — Z91.81 AT HIGH RISK FOR FALLS: Status: ACTIVE | Noted: 2021-01-01

## 2021-04-10 PROBLEM — I21.4 NSTEMI (NON-ST ELEVATED MYOCARDIAL INFARCTION) (HCC): Status: ACTIVE | Noted: 2021-01-01

## 2021-04-10 PROBLEM — I10 HYPERTENSION: Status: ACTIVE | Noted: 2019-10-22

## 2021-04-10 PROBLEM — G89.29 CHRONIC LOW BACK PAIN WITHOUT SCIATICA: Status: ACTIVE | Noted: 2019-07-30

## 2021-04-10 NOTE — ED PROVIDER NOTES
Subjective     History provided by:  Patient   used: No    Patient is 85 years old male with past medical his diabetes, hypertension who presented here today because of shortness of breath which is progressing getting worse.  Also, have some chest pain off and on all day today.  Denies any sweating denies any nausea vomiting.  Patient said that he was sitting down when the pain started.  Denies any radiation of pain.    Review of Systems   Respiratory: Positive for shortness of breath.    Cardiovascular: Positive for chest pain.   All other systems reviewed and are negative.      Past Medical History:   Diagnosis Date   • Arthritis    • Broken ribs 2018   • Diabetes mellitus (CMS/HCC)    • Hypertension    • Inguinal hernia        Allergies   Allergen Reactions   • Zofran [Ondansetron Hcl] Other (See Comments)     Headache         Past Surgical History:   Procedure Laterality Date   • APPENDECTOMY     • CYSTOSCOPY N/A 2018    Procedure: CYSTOSCOPY;  Surgeon: Michael Forbes MD;  Location: [facility];  Service: Urology   • CYSTOSCOPY TRANSURETHRAL RESECTION OF PROSTATE N/A 2018    Procedure: CYSTOSCOPY TRANSURETHRAL RESECTION OF PROSTATE; PLACEMENT SUPRAPUBIC CATHETER;  Surgeon: Michael Forbes MD;  Location: [facility];  Service: Urology   • INGUINAL HERNIA REPAIR Left 2018    Procedure: OPEN REPAIR OF A LARGE INGUINAL HERNIA;  Surgeon: Ramin Collado MD;  Location: [facility];  Service: General       Family History   Problem Relation Age of Onset   • Heart disease Mother         MI   • No Known Problems Father        Social History     Socioeconomic History   • Marital status:      Spouse name: Not on file   • Number of children: Not on file   • Years of education: Not on file   • Highest education level: Not on file   Tobacco Use   • Smoking status: Former Smoker     Packs/day: 1.00     Types: Cigarettes     Quit date:      Years since quittin.2   •  "Smokeless tobacco: Never Used   • Tobacco comment: smoke for 50 years   Substance and Sexual Activity   • Alcohol use: Yes     Comment: occasionally   • Drug use: No   • Sexual activity: Defer       /67 (BP Location: Right arm, Patient Position: Lying)   Pulse 102   Temp 98.2 °F (36.8 °C) (Axillary)   Resp 22   Ht 170.2 cm (67\")   Wt 69.1 kg (152 lb 6.4 oz)   SpO2 91%   BMI 23.87 kg/m²     Objective   Physical Exam  Vitals and nursing note reviewed.   Constitutional:       Appearance: He is well-developed and normal weight.   HENT:      Head: Normocephalic and atraumatic.      Mouth/Throat:      Mouth: Mucous membranes are moist.   Eyes:      Extraocular Movements: Extraocular movements intact.      Pupils: Pupils are equal, round, and reactive to light.   Cardiovascular:      Rate and Rhythm: Normal rate and regular rhythm.   Pulmonary:      Effort: Pulmonary effort is normal.      Breath sounds: Normal breath sounds.   Abdominal:      General: Bowel sounds are normal.      Palpations: Abdomen is soft.   Musculoskeletal:         General: Normal range of motion.      Cervical back: Normal range of motion and neck supple.   Skin:     General: Skin is warm.      Capillary Refill: Capillary refill takes less than 2 seconds.   Neurological:      General: No focal deficit present.      Mental Status: He is alert.   Psychiatric:         Mood and Affect: Mood normal.         Behavior: Behavior normal.         Procedures           ED Course  ED Course as of Apr 11 0714   Sat Apr 10, 2021   1637 Results were discussed with patient and family member.Dr. Shannon was called who accepted patient.    [MO]      ED Course User Index  [MO] Lars Parker MD            Labs Reviewed   COMPREHENSIVE METABOLIC PANEL - Abnormal; Notable for the following components:       Result Value    Glucose 198 (*)     Sodium 132 (*)     Chloride 95 (*)     AST (SGOT) 48 (*)     All other components within normal limits    " Narrative:     GFR Normal >60  Chronic Kidney Disease <60  Kidney Failure <15     BNP (IN-HOUSE) - Abnormal; Notable for the following components:    proBNP 14,408.0 (*)     All other components within normal limits    Narrative:     Among patients with dyspnea, NT-proBNP is highly sensitive for the detection of acute congestive heart failure. In addition NT-proBNP of <300 pg/ml effectively rules out acute congestive heart failure with 99% negative predictive value.    Results may be falsely decreased if patient taking Biotin.     TROPONIN (IN-HOUSE) - Abnormal; Notable for the following components:    Troponin T 0.722 (*)     All other components within normal limits    Narrative:     Troponin T Reference Range:  <= 0.03 ng/mL-   Negative for AMI  >0.03 ng/mL-     Abnormal for myocardial necrosis.  Clinicians would have to utilize clinical acumen, EKG, Troponin and serial changes to determine if it is an Acute Myocardial Infarction or myocardial injury due to an underlying chronic condition.       Results may be falsely decreased if patient taking Biotin.     CBC WITH AUTO DIFFERENTIAL - Abnormal; Notable for the following components:    WBC 17.16 (*)     RBC 3.73 (*)     Hemoglobin 10.8 (*)     Hematocrit 31.9 (*)     MPV 12.7 (*)     Neutrophil % 78.4 (*)     Lymphocyte % 10.3 (*)     Eosinophil % 0.0 (*)     Neutrophils, Absolute 13.45 (*)     Monocytes, Absolute 1.84 (*)     Immature Grans, Absolute 0.07 (*)     All other components within normal limits   MAGNESIUM - Abnormal; Notable for the following components:    Magnesium 1.2 (*)     All other components within normal limits   TROPONIN (IN-HOUSE) - Abnormal; Notable for the following components:    Troponin T 1.930 (*)     All other components within normal limits    Narrative:     Troponin T Reference Range:  <= 0.03 ng/mL-   Negative for AMI  >0.03 ng/mL-     Abnormal for myocardial necrosis.  Clinicians would have to utilize clinical acumen, EKG,  Troponin and serial changes to determine if it is an Acute Myocardial Infarction or myocardial injury due to an underlying chronic condition.       Results may be falsely decreased if patient taking Biotin.     APTT - Abnormal; Notable for the following components:    PTT 56.2 (*)     All other components within normal limits    Narrative:     The recommended Heparin therapeutic range is 68-97 seconds.   BASIC METABOLIC PANEL - Abnormal; Notable for the following components:    Glucose 168 (*)     BUN 24 (*)     Sodium 131 (*)     Chloride 96 (*)     eGFR Non  Amer 56 (*)     All other components within normal limits    Narrative:     GFR Normal >60  Chronic Kidney Disease <60  Kidney Failure <15     HEMOGLOBIN A1C - Abnormal; Notable for the following components:    Hemoglobin A1C 7.20 (*)     All other components within normal limits    Narrative:     Hemoglobin A1C Ranges:    Increased Risk for Diabetes  5.7% to 6.4%  Diabetes                     >= 6.5%  Diabetic Goal                < 7.0%   CBC WITH AUTO DIFFERENTIAL - Abnormal; Notable for the following components:    WBC 11.01 (*)     RBC 3.08 (*)     Hemoglobin 9.3 (*)     Hematocrit 25.7 (*)     MCHC 36.2 (*)     MPV 12.6 (*)     Platelets 118 (*)     Lymphocyte % 19.3 (*)     Monocyte % 12.1 (*)     Eosinophil % 0.1 (*)     Neutrophils, Absolute 7.46 (*)     Monocytes, Absolute 1.33 (*)     All other components within normal limits   POCT GLUCOSE FINGERSTICK - Abnormal; Notable for the following components:    Glucose 178 (*)     All other components within normal limits   POCT GLUCOSE FINGERSTICK - Abnormal; Notable for the following components:    Glucose 193 (*)     All other components within normal limits   POCT GLUCOSE FINGERSTICK - Abnormal; Notable for the following components:    Glucose 152 (*)     All other components within normal limits   COVID-19 AND FLU A/B, NP SWAB IN TRANSPORT MEDIA 8-12 HR TAT - Normal    Narrative:     Fact sheet  for providers: https://www.fda.gov/media/583428/download    Fact sheet for patients: https://www.fda.gov/media/497438/download    Test performed by PCR.   LACTIC ACID, PLASMA - Normal   PROTIME-INR - Normal    Narrative:     Therapeutic range for most indications is 2.0-3.0 INR,  or 2.5-3.5 for mechanical heart valves.   APTT - Normal    Narrative:     The recommended Heparin therapeutic range is 68-97 seconds.   BLOOD CULTURE   BLOOD CULTURE   LEGIONELLA ANTIGEN, URINE   STREP PNEUMO AG, URINE OR CSF   RAINBOW DRAW    Narrative:     The following orders were created for panel order Melvin Draw.  Procedure                               Abnormality         Status                     ---------                               -----------         ------                     Light Blue Top[[id number]]                                   Final result               Green Top (Gel)[[id number]]                                  Final result               Lavender Top[[id number]]                                     Final result               Gold Top - SST[[id number]]                                   Final result                 Please view results for these tests on the individual orders.   LIPID PANEL    Narrative:     Cholesterol Reference Ranges  (U.S. Department of Health and Human Services ATP III Classifications)    Desirable          <200 mg/dL  Borderline High    200-239 mg/dL  High Risk          >240 mg/dL      Triglyceride Reference Ranges  (U.S. Department of Health and Human Services ATP III Classifications)    Normal           <150 mg/dL  Borderline High  150-199 mg/dL  High             200-499 mg/dL  Very High        >500 mg/dL    HDL Reference Ranges  (U.S. Department of Health and Human Services ATP III Classifcations)    Low     <40 mg/dl (major risk factor for CHD)  High    >60 mg/dl ('negative' risk factor for CHD)        LDL Reference Ranges  (U.S. Department of Health and Human Services ATP III  Classifcations)    Optimal          <100 mg/dL  Near Optimal     100-129 mg/dL  Borderline High  130-159 mg/dL  High             160-189 mg/dL  Very High        >189 mg/dL   URINALYSIS W/ MICROSCOPIC IF INDICATED (NO CULTURE)   POCT GLUCOSE FINGERSTICK   POCT GLUCOSE FINGERSTICK   POCT GLUCOSE FINGERSTICK   POCT GLUCOSE FINGERSTICK   POCT GLUCOSE FINGERSTICK   POCT GLUCOSE FINGERSTICK   CBC AND DIFFERENTIAL    Narrative:     The following orders were created for panel order CBC & Differential.  Procedure                               Abnormality         Status                     ---------                               -----------         ------                     CBC Auto Differential[[id number]]        Abnormal            Final result                 Please view results for these tests on the individual orders.   LIGHT BLUE TOP   GREEN TOP   LAVENDER TOP   GOLD TOP - SST   CBC AND DIFFERENTIAL    Narrative:     The following orders were created for panel order CBC & Differential.  Procedure                               Abnormality         Status                     ---------                               -----------         ------                     CBC Auto Differential[[id number]]        Abnormal            Final result                 Please view results for these tests on the individual orders.       XR Chest 1 View   Final Result   Interstitial opacities with a few scattered areas of airspace   opacification, may represent edema or pneumonia.         Electronically signed by:  Kiki Benton MD  4/10/2021 4:33 PM CDT   Workstation: [id number]                                        [facility]    Final diagnoses:   Pneumonia due to infectious organism, unspecified laterality, unspecified part of lung   NSTEMI (non-ST elevated myocardial infarction) (CMS/[facility])       ED Disposition  ED Disposition     ED Disposition Condition Comment    Decision to Admit  Level of Care: Telemetry [5]   Diagnosis: Pneumonia due to  infectious organism, unspecified laterality, unspecified part of lung [[id number]]   Admitting Physician: RUTH GARDUNO [[id number]]   Attending Physician: RUTH GARDUNO [[id number]]            No follow-up provider specified.       Medication List      No changes were made to your prescriptions during this visit.          Lars Parker MD  04/11/21 0714

## 2021-04-10 NOTE — H&P
[facility] Medicine Services  INPATIENT HISTORY AND PHYSICAL       Patient Care Team:  Vielka Ramirez DO as PCP - General    Chief complaint   Chief Complaint   Patient presents with   • Shortness of Breath   • Chest Pain   • Heartburn     Subjective     Patient is a 85 y.o. male with a past medical history of type 2 diabetes mellitus, essential hypertension, chronic pain, osteoarthritis and BPH who presented with complaints of shortness of breath and fatigue of 1 week duration and chest pain of 1 day duration.    Patient has been having shortness of breath and easy fatigability over the last 1 week.  He also noticed fatigue. Yesterday he developed chest pain while at rest which was retrosternal and mostly involved the left side of his chest. The chest pain was burning in nature and he felt it was heartburn. His chest pain subsequently resolved. This morning he developed another episode of squeezing retrosternal and left-sided chest pain with diaphoresis that persisted. EMS was called and he was brought to the emergency room.  Patient received nitroglycerin by EMS and received aspirin in the emergency room with subsequent relief of his chest pain. He was slightly tachycardic on presentation with low normal blood pressure.  EKG showed ST segment depressions in leads II, aVF and V5 and V6.  T wave inversions in lead II and aVF. Troponin was elevated at 0.772. His proBNP was markedly elevated at 14,408. Patient also had leukocytosis. Chest x-ray showed interstitial opacities concerning for edema or pneumonia.    Review of Systems   Constitutional: Positive for diaphoresis. Negative for activity change, appetite change, chills, fatigue and fever.   HENT: Positive for rhinorrhea. Negative for congestion, ear pain, sore throat and trouble swallowing.    Respiratory: Positive for shortness of breath. Negative for cough, chest tightness and wheezing.    Cardiovascular: Positive  for chest pain. Negative for palpitations and leg swelling.   Gastrointestinal: Negative for abdominal distention, abdominal pain, diarrhea, nausea and vomiting.   Genitourinary: Negative for difficulty urinating, dysuria and hematuria.   Musculoskeletal: Negative for arthralgias, back pain and myalgias.   Skin: Negative for pallor and rash.   Neurological: Negative for dizziness, syncope, weakness, light-headedness and headaches.   Hematological: Negative for adenopathy. Does not bruise/bleed easily.   Psychiatric/Behavioral: Negative for agitation and confusion. The patient is not nervous/anxious.      History  Past Medical History:   Diagnosis Date   • Arthritis    • Broken ribs 2018   • Diabetes mellitus (CMS/[facility])    • Hypertension    • Inguinal hernia      Past Surgical History:   Procedure Laterality Date   • APPENDECTOMY     • CYSTOSCOPY N/A 2018    Procedure: CYSTOSCOPY;  Surgeon: Michael Forbes MD;  Location: [facility];  Service: Urology   • CYSTOSCOPY TRANSURETHRAL RESECTION OF PROSTATE N/A 2018    Procedure: CYSTOSCOPY TRANSURETHRAL RESECTION OF PROSTATE; PLACEMENT SUPRAPUBIC CATHETER;  Surgeon: Michael Forbes MD;  Location: [facility];  Service: Urology   • INGUINAL HERNIA REPAIR Left 2018    Procedure: OPEN REPAIR OF A LARGE INGUINAL HERNIA;  Surgeon: Ramin Collado MD;  Location: [facility];  Service: General     Family History   Problem Relation Age of Onset   • Heart disease Mother         MI   • No Known Problems Father      Social History     Tobacco Use   • Smoking status: Former Smoker     Packs/day: 1.00     Types: Cigarettes     Quit date:      Years since quittin.2   • Smokeless tobacco: Never Used   • Tobacco comment: smoke for 50 years   Substance Use Topics   • Alcohol use: Yes     Comment: occasionally   • Drug use: No     Medications Prior to Admission   Medication Sig Dispense Refill Last Dose   • carvedilol (COREG) 12.5 MG tablet Take 12.5 mg by mouth  2 (Two) Times a Day With Meals.   4/10/2021 at 0900   • finasteride (PROSCAR) 5 MG tablet Take 5 mg by mouth Daily.   4/10/2021 at 0900   • HYDROcodone-acetaminophen (NORCO)  MG per tablet TAKE 1 TABLET BY MOUTH EVERY SIX HOURS AS NEEDED FOR PAIN   4/10/2021 at 0900   • lisinopril (PRINIVIL,ZESTRIL) 10 MG tablet Take 10 mg by mouth Daily.   4/10/2021 at 0900   • metFORMIN (GLUCOPHAGE) 500 MG tablet Take 500 mg by mouth 2 (Two) Times a Day With Meals.   4/10/2021 at 0900   • NIFEdipine XL (PROCARDIA XL) 30 MG 24 hr tablet Take 30 mg by mouth Daily.   4/10/2021 at Unknown time   • tamsulosin (FLOMAX) 0.4 MG capsule 24 hr capsule Take 1 capsule by mouth Every Night.   4/9/2021 at Unknown time     Allergies:  Zofran [ondansetron hcl]  Prior to Admission medications    Medication Sig Start Date End Date Taking? Authorizing Provider   carvedilol (COREG) 12.5 MG tablet Take 12.5 mg by mouth 2 (Two) Times a Day With Meals. 3/13/18  Yes Provider, Historical, MD   finasteride (PROSCAR) 5 MG tablet Take 5 mg by mouth Daily. 3/13/18  Yes Provider, Historical, MD   HYDROcodone-acetaminophen (NORCO)  MG per tablet TAKE 1 TABLET BY MOUTH EVERY SIX HOURS AS NEEDED FOR PAIN 2/23/21 4/22/21 Yes Emergency, Nurse Peterson RN   lisinopril (PRINIVIL,ZESTRIL) 10 MG tablet Take 10 mg by mouth Daily. 3/13/18  Yes Provider, Historical, MD   metFORMIN (GLUCOPHAGE) 500 MG tablet Take 500 mg by mouth 2 (Two) Times a Day With Meals. 3/13/18  Yes Provider, Historical, MD   NIFEdipine XL (PROCARDIA XL) 30 MG 24 hr tablet Take 30 mg by mouth Daily. 1/11/21  Yes Emergency, Nurse Peterson RN   tamsulosin (FLOMAX) 0.4 MG capsule 24 hr capsule Take 1 capsule by mouth Every Night.   Yes Provider, Historical, MD   albuterol (PROVENTIL HFA;VENTOLIN HFA) 108 (90 Base) MCG/ACT inhaler Inhale 2 puffs Every 6 (Six) Hours As Needed for Wheezing. 1/5/18 4/10/21  Boris Zamora MD   glipiZIDE (GLUCOTROL) 5 MG tablet Take 5 mg by mouth 2 (Two) Times a Day  Before Meals.  4/10/21  Provider, MD Yanira   Multiple Vitamins-Calcium (ONE-A-DAY WITHIN PO) Take 1 tablet by mouth Daily.  4/10/21  Yanira Schofield MD   NIFEdipine (PROCARDIA) 10 MG capsule Take 10 mg by mouth Daily.  4/10/21  Yanira Schofield MD   oxyCODONE-acetaminophen (PERCOCET) 7.5-325 MG per tablet Take 1 tablet by mouth Every 4 (Four) Hours As Needed (Pain) for up to 15 doses. 5/10/18 4/10/21  [place], Michael GAVIRIA MD       I have reviewed the patient's current medications    Objective        Vital Signs  Temp:  [97.4 °F (36.3 °C)-98.4 °F (36.9 °C)] 97.4 °F (36.3 °C)  Heart Rate:  [] 98  Resp:  [18-20] 18  BP: (113-182)/(68-82) 182/68    Physical Exam  Constitutional:       General: He is not in acute distress.     Appearance: Normal appearance. He is well-developed. He is not diaphoretic.   HENT:      Head: Normocephalic and atraumatic.      Mouth/Throat:      Pharynx: No oropharyngeal exudate or posterior oropharyngeal erythema.   Eyes:      General: No scleral icterus.     Conjunctiva/sclera: Conjunctivae normal.      Pupils: Pupils are equal, round, and reactive to light.   Neck:      Thyroid: No thyromegaly.      Vascular: No JVD.      Trachea: No tracheal deviation.   Cardiovascular:      Rate and Rhythm: Normal rate and regular rhythm.      Heart sounds: Normal heart sounds. No murmur heard.   No friction rub. No gallop.    Pulmonary:      Effort: Pulmonary effort is normal. No respiratory distress.      Breath sounds: Normal breath sounds. No stridor. No wheezing or rales.   Chest:      Chest wall: No tenderness.   Abdominal:      General: Bowel sounds are normal. There is no distension.      Palpations: Abdomen is soft. There is no mass.      Tenderness: There is no abdominal tenderness. There is no guarding or rebound.      Hernia: No hernia is present.   Musculoskeletal:         General: No tenderness or deformity. Normal range of motion.      Cervical back: Normal range of  motion and neck supple.      Right lower leg: Edema present.      Left lower leg: Edema present.   Lymphadenopathy:      Cervical: No cervical adenopathy.   Skin:     General: Skin is warm and dry.      Coloration: Skin is not pale.      Findings: No erythema or rash.   Neurological:      Mental Status: He is alert and oriented to person, place, and time.      Cranial Nerves: No cranial nerve deficit.      Sensory: No sensory deficit.      Motor: No abnormal muscle tone.      Coordination: Coordination normal.   Psychiatric:         Behavior: Behavior normal.         Thought Content: Thought content normal.         Judgment: Judgment normal.       Results Review:     Results from last 7 days   Lab Units 04/10/21  1539   SODIUM mmol/L 132*   POTASSIUM mmol/L 3.7   CHLORIDE mmol/L 95*   CO2 mmol/L 26.0   BUN mg/dL 19   CREATININE mg/dL 1.09   GLUCOSE mg/dL 198*   CALCIUM mg/dL 9.7   BILIRUBIN mg/dL 0.8   ALK PHOS U/L 74   ALT (SGPT) U/L 14   AST (SGOT) U/L 48*       Results from last 7 days   Lab Units 04/10/21  1539   MAGNESIUM mg/dL 1.2*       Results from last 7 days   Lab Units 04/10/21  1539   WBC 10*3/mm3 17.16*   HEMOGLOBIN g/dL 10.8*   HEMATOCRIT % 31.9*   PLATELETS 10*3/mm3 191     Results from last 7 days   Lab Units 04/10/21  1539   INR  0.98     Imaging Results (Last 7 Days)     Procedure Component Value Units Date/Time    XR Chest 1 View [[id number]] Collected: 04/10/21 1605     Updated: 04/10/21 1634    Narrative:      PROCEDURE: XR CHEST 1 VW    VIEWS:Single    INDICATION: Shortness of breath    COMPARISON: CXR: 1/5/2018    FINDINGS:       - lines/tubes: None    - cardiac: Size within normal limits.    - mediastinum: Contour within normal limits.     - lungs: No evidence of a focal air space process. Interstitial  and airspace opacities bilaterally, may represent edema or  pneumonia  - pleura: No evidence of  fluid.      - osseous: Unremarkable for age.      Impression:      Interstitial opacities with a  few scattered areas of airspace  opacification, may represent edema or pneumonia.      Electronically signed by:  Kiki Benton MD  4/10/2021 4:33 PM CDT  Workstation: [id number]          Assessment / Plan     Hospital Problem List:  Principal Problem:    NSTEMI (non-ST elevated myocardial infarction) (CMS/[facility])  Active Problems:    Type 2 diabetes mellitus without complication, without long-term current use of insulin (CMS/[facility])    Hypertension    Pneumonia due to infectious organism  Acute diastolic congestive heart failure  Chronic pain syndrome    Plan  -Patient symptoms are concerning for NSTEMI with EKG changes and elevated troponin in the setting of markedly elevated proBNP  -We will start patient on a heparin drip and start Coreg and continue aspirin  -Cardiology consultation  -Echocardiogram  -Monitor on telemetry  -Replete electrolytes aggressively  -Pain control with as needed nitroglycerin sublingually and IV morphine as needed  -IV Lasix 20 mg daily and will increase dose if blood pressure allows  -Monitor fluid input and output closely  -Start IV antibiotic with IV ceftriaxone and IV doxycycline  -NovoLog sliding scale for type 2 diabetes mellitus  -CODE STATUS is full code    I discussed the patient's findings and my recommendations with patient and his wife    Warren Shannon MD  04/10/21  19:11 CDT        Part of this note may be an electronic transcription/translation of spoken language to printed text using the Dragon Dictation System.

## 2021-04-10 NOTE — ED NOTES
Telemetry applied to patient and verified transmission with tele tech     Ele Escobar RN  04/10/21 8997

## 2021-04-11 NOTE — NURSING NOTE
Patient is refusing second IV. This RN explained the importance of secondary IV due to Heparin gtt et IV Antibiotics. Patient declines second IV at this time.

## 2021-04-11 NOTE — CONSULTS
CARDIOLOGY CONSULTATION NOTE    Referring Provider: Lars Parker, */ Hospitalist Service    Reason for Consultation: Evaluation of progressive shortness of breath, chest pain, abnormal troponin suggestive of non-ST segment elevation myocardial infarction    Eze Downing  1935  85 y.o. male      HPI  Eze Downing is an 85-year-old male with history of type 2 diabetes mellitus, hypertension, degenerative joint disease, chronic pain syndrome, BPH who presented to the emergency room with a apparently a 1 week history of increasing shortness of breath and intermittent chest pain which has been worse for about a day prior to admission.   The patient was confused/agitated at the time of my examination and most of the information was obtained from the medical records.  Apparently his chest pain was burning in character and felt like a heartburn which subsequently resolved.  There was some degree of diaphoresis as well.  He did respond to nitroglycerin given by EMS.  EKG performed in the ER showed sinus sinus tachycardia with right bundle branch block, rightward axis, diffuse ST-T wave changes which were nonspecific.  EKG today also showed similar findings.  X-ray of the chest showed interstitial infiltrates and differential diagnosis included pneumonia/interstitial edema.  Cardiac troponins have been elevated at 0.722 on admission and today was 1.93.  BNP was markedly elevated at [address].    The patient did not voice any complaints at the time of my examination but was clearly confused and trying to crawl out of bed.  He was not in any acute distress.  The patient has apparently been refusing blood draws.    SUBJECTIVE    Past Medical History:   Diagnosis Date   • Arthritis    • Broken ribs 01/05/2018   • Diabetes mellitus (CMS/HCC)    • Hypertension    • Inguinal hernia          Past Surgical History:   Procedure Laterality Date   • APPENDECTOMY  1980's   • CYSTOSCOPY N/A 4/25/2018    Procedure:  CYSTOSCOPY;  Surgeon: Michael Forbes MD;  Location: [facility];  Service: Urology   • CYSTOSCOPY TRANSURETHRAL RESECTION OF PROSTATE N/A 2018    Procedure: CYSTOSCOPY TRANSURETHRAL RESECTION OF PROSTATE; PLACEMENT SUPRAPUBIC CATHETER;  Surgeon: Michael Forbes MD;  Location: [facility];  Service: Urology   • INGUINAL HERNIA REPAIR Left 2018    Procedure: OPEN REPAIR OF A LARGE INGUINAL HERNIA;  Surgeon: Ramin Collado MD;  Location: [facility];  Service: General         Family History   Problem Relation Age of Onset   • Heart disease Mother         MI   • No Known Problems Father          Social History     Socioeconomic History   • Marital status:      Spouse name: Not on file   • Number of children: Not on file   • Years of education: Not on file   • Highest education level: Not on file   Tobacco Use   • Smoking status: Former Smoker     Packs/day: 1.00     Types: Cigarettes     Quit date:      Years since quittin.2   • Smokeless tobacco: Never Used   • Tobacco comment: smoke for 50 years   Substance and Sexual Activity   • Alcohol use: Yes     Comment: occasionally   • Drug use: No   • Sexual activity: Defer         Allergies   Allergen Reactions   • Zofran [Ondansetron Hcl] Other (See Comments)     Headache           Current Facility-Administered Medications   Medication Dose Route Frequency Provider Last Rate Last Admin   • acetaminophen (TYLENOL) tablet 650 mg  650 mg Oral Q4H PRN Warren Shannon MD       • aspirin EC tablet 81 mg  81 mg Oral Daily Warren Shannon MD   81 mg at 21 0800   • carvedilol (COREG) tablet 6.25 mg  6.25 mg Oral BID With Meals Warren Shannon MD   6.25 mg at 21 0759   • cefTRIAXone (ROCEPHIN) 1 g/100 mL 0.9% NS (MBP)  1 g Intravenous Q24H Warren Shannon MD   Stopped at 21 0200   • dextrose (D50W) 25 g/ 50mL Intravenous Solution 25 g  25 g Intravenous Q15 Min PRN Warren Shannon MD       • dextrose (GLUTOSE) oral gel 15 g  15  "g Oral Q15 Min PRN Warren Shannon MD       • doxycycline (VIBRAMYCIN) 100 mg/100 mL 0.9% NS MBP  100 mg Intravenous Q12H Warren Shannon MD   Stopped at 04/10/21 2100   • finasteride (PROSCAR) tablet 5 mg  5 mg Oral Daily Warren Shannon MD   5 mg at 04/11/21 0800   • furosemide (LASIX) injection 20 mg  20 mg Intravenous Daily Warren Shannon MD   20 mg at 04/11/21 0800   • glucagon (human recombinant) (GLUCAGEN DIAGNOSTIC) injection 1 mg  1 mg Subcutaneous Q15 Min PRN Warren Shannon MD       • HYDROcodone-acetaminophen (NORCO)  MG per tablet 1 tablet  1 tablet Oral Q6H PRN Warren Shannon MD   1 tablet at 04/11/21 0102   • insulin aspart (novoLOG) injection 0-7 Units  0-7 Units Subcutaneous TID AC Warren Shannon MD   2 Units at 04/10/21 1845   • ipratropium-albuterol (DUO-NEB) nebulizer solution 3 mL  3 mL Nebulization Q4H PRN Imer Chappell MD   3 mL at 04/11/21 0629   • morphine injection 2 mg  2 mg Intravenous Q2H PRN Warren Shannon MD   2 mg at 04/10/21 2158    And   • naloxone (NARCAN) injection 0.4 mg  0.4 mg Intravenous Q5 Min PRN Warren Shannon MD       • nitroglycerin (NITROSTAT) SL tablet 0.4 mg  0.4 mg Sublingual Q5 Min PRN Warren Shannon MD       • prochlorperazine (COMPAZINE) injection 5 mg  5 mg Intravenous Q6H PRN Warren Shannon MD   5 mg at 04/10/21 2158   • sodium chloride 0.9 % flush 10 mL  10 mL Intravenous PRN Warren Shannon MD       • sodium chloride 0.9 % flush 10 mL  10 mL Intravenous Q12H Warren Shannon MD   10 mL at 04/11/21 0800   • sodium chloride 0.9 % flush 10 mL  10 mL Intravenous PRN Warren Shannon MD       • tamsulosin (FLOMAX) 24 hr capsule 0.4 mg  0.4 mg Oral Nightly Warren Shannon MD   0.4 mg at 04/10/21 2202         OBJECTIVE    /69 (BP Location: Right arm, Patient Position: Lying)   Pulse 100   Temp 98.7 °F (37.1 °C) (Axillary)   Resp 18   Ht 170.2 cm (67\")   Wt 69.1 kg (152 lb 6.4 oz)   " SpO2 91%   BMI 23.87 kg/m²       Review of Systems : Could not be obtained.  Patient confused/agitated    Physical Exam:     Constitutional: Cooperative, alert and oriented,  in no acute distress.     HENT:   Head: Normocephalic, normal hair patterns, no masses or tenderness.  Ears, Nose, and Throat: No gross abnormalities. No pallor or cyanosis.   Eyes: EOMS intact, PERRL, conjunctivae and lids unremarkable. Fundoscopic exam and visual fields not performed.   Neck: No palpable masses or adenopathy, no thyromegaly, no JVD, carotid pulses are full and equal bilaterally and without bruit.     Cardiovascular: Regular rhythm, S1 and S2 normal, no S3 or S4. No murmurs, gallops, or rubs detected.     Pulmonary/Chest: Chest: Mild increased AP diameter of the chest.  No intercostal retraction, no use of accessory muscles.   Pulmonary: Coarse breath sounds, few right basal rales.  No rhonchi    Abdominal: Abdomen soft, bowel sounds normoactive, no masses, no hepatosplenomegaly, nontender, no bruits.     Musculoskeletal: No deformities, clubbing, cyanosis, erythema, or edema observed.     Neurological: No gross motor or sensory deficits noted.  Patient moving all 4 extremities.  He is confused/agitated    RESULTS  Lab Results (last 24 hours)     Procedure Component Value Units Date/Time    POC Glucose Once [[id number]]  (Abnormal) Collected: 04/11/21 0638    Specimen: Blood Updated: 04/11/21 0709     Glucose 152 mg/dL      Comment: RN NotifiedOperator: [id number] Midkiff ELRITAMeter ID: [id number]       aPTT [[id number]]  (Abnormal) Collected: 04/11/21 0554    Specimen: Blood Updated: 04/11/21 0704     PTT 56.2 seconds     Narrative:      The recommended Heparin therapeutic range is 68-97 seconds.    Troponin [[id number]]  (Abnormal) Collected: 04/11/21 0554    Specimen: Blood Updated: 04/11/21 0642     Troponin T 1.930 ng/mL     Narrative:      Troponin T Reference Range:  <= 0.03 ng/mL-   Negative for AMI  >0.03 ng/mL-      Abnormal for myocardial necrosis.  Clinicians would have to utilize clinical acumen, EKG, Troponin and serial changes to determine if it is an Acute Myocardial Infarction or myocardial injury due to an underlying chronic condition.       Results may be falsely decreased if patient taking Biotin.      Basic Metabolic Panel [[id number]]  (Abnormal) Collected: 04/11/21 0554    Specimen: Blood Updated: 04/11/21 0631     Glucose 168 mg/dL      BUN 24 mg/dL      Creatinine 1.24 mg/dL      Sodium 131 mmol/L      Potassium 3.9 mmol/L      Chloride 96 mmol/L      CO2 27.0 mmol/L      Calcium 9.0 mg/dL      eGFR Non African Amer 56 mL/min/1.73      BUN/Creatinine Ratio 19.4     Anion Gap 8.0 mmol/L     Narrative:      GFR Normal >60  Chronic Kidney Disease <60  Kidney Failure <15      Lipid Panel [[id number]] Collected: 04/11/21 0554    Specimen: Blood Updated: 04/11/21 0631     Total Cholesterol 152 mg/dL      Triglycerides 53 mg/dL      HDL Cholesterol 57 mg/dL      LDL Cholesterol  84 mg/dL      VLDL Cholesterol 11 mg/dL      LDL/HDL Ratio 1.48    Narrative:      Cholesterol Reference Ranges  (U.S. Department of Health and Human Services ATP III Classifications)    Desirable          <200 mg/dL  Borderline High    200-239 mg/dL  High Risk          >240 mg/dL      Triglyceride Reference Ranges  (U.S. Department of Health and Human Services ATP III Classifications)    Normal           <150 mg/dL  Borderline High  150-199 mg/dL  High             200-499 mg/dL  Very High        >500 mg/dL    HDL Reference Ranges  (U.S. Department of Health and Human Services ATP III Classifcations)    Low     <40 mg/dl (major risk factor for CHD)  High    >60 mg/dl ('negative' risk factor for CHD)        LDL Reference Ranges  (U.S. Department of Health and Human Services ATP III Classifcations)    Optimal          <100 mg/dL  Near Optimal     100-129 mg/dL  Borderline High  130-159 mg/dL  High             160-189 mg/dL  Very High        >189  mg/dL    Hemoglobin A1c [[id number]]  (Abnormal) Collected: 04/11/21 0555    Specimen: Blood Updated: 04/11/21 0623     Hemoglobin A1C 7.20 %     Narrative:      Hemoglobin A1C Ranges:    Increased Risk for Diabetes  5.7% to 6.4%  Diabetes                     >= 6.5%  Diabetic Goal                < 7.0%    CBC & Differential [[id number]]  (Abnormal) Collected: 04/11/21 0555    Specimen: Blood Updated: 04/11/21 0605    Narrative:      The following orders were created for panel order CBC & Differential.  Procedure                               Abnormality         Status                     ---------                               -----------         ------                     CBC Auto Differential[[id number]]        Abnormal            Final result                 Please view results for these tests on the individual orders.    CBC Auto Differential [[id number]]  (Abnormal) Collected: 04/11/21 0555    Specimen: Blood Updated: 04/11/21 0605     WBC 11.01 10*3/mm3      RBC 3.08 10*6/mm3      Hemoglobin 9.3 g/dL      Hematocrit 25.7 %      MCV 83.4 fL      MCH 30.2 pg      MCHC 36.2 g/dL      RDW 13.2 %      RDW-SD 39.8 fl      MPV 12.6 fL      Platelets 118 10*3/mm3      Neutrophil % 67.7 %      Lymphocyte % 19.3 %      Monocyte % 12.1 %      Eosinophil % 0.1 %      Basophil % 0.4 %      Immature Grans % 0.4 %      Neutrophils, Absolute 7.46 10*3/mm3      Lymphocytes, Absolute 2.13 10*3/mm3      Monocytes, Absolute 1.33 10*3/mm3      Eosinophils, Absolute 0.01 10*3/mm3      Basophils, Absolute 0.04 10*3/mm3      Immature Grans, Absolute 0.04 10*3/mm3      nRBC 0.0 /100 WBC     POC Glucose Once [[id number]]  (Abnormal) Collected: 04/10/21 2024    Specimen: Blood Updated: 04/10/21 2043     Glucose 193 mg/dL      Comment: RN NotifiedOperator: [id number] DARYL ELRITAMeter ID: [id number]       POC Glucose Once [[id number]]  (Abnormal) Collected: 04/10/21 1818    Specimen: Blood Updated: 04/10/21 1841     Glucose 178 mg/dL       Comment: RN NotifiedOperator: [id number] LAMBERTO NOAHMeter ID: [id number]       Magnesium [[id number]]  (Abnormal) Collected: 04/10/21 1539    Specimen: Blood Updated: 04/10/21 1827     Magnesium 1.2 mg/dL     Protime-INR [[id number]]  (Normal) Collected: 04/10/21 1539    Specimen: Blood Updated: 04/10/21 1804     Protime 13.4 Seconds      INR 0.98    Narrative:      Therapeutic range for most indications is 2.0-3.0 INR,  or 2.5-3.5 for mechanical heart valves.    aPTT [[id number]]  (Normal) Collected: 04/10/21 1539    Specimen: Blood Updated: 04/10/21 1804     PTT 29.1 seconds     Narrative:      The recommended Heparin therapeutic range is 68-97 seconds.    Lactic Acid, Plasma [[id number]]  (Normal) Collected: 04/10/21 1651    Specimen: Blood Updated: 04/10/21 1708     Lactate 1.7 mmol/L     Blood Culture - Blood, Arm, Left [[id number]] Collected: 04/10/21 1651    Specimen: Blood from Arm, Left Updated: 04/10/21 1655    [place] Draw [[id number]] Collected: 04/10/21 1539    Specimen: Blood Updated: 04/10/21 1645    Narrative:      The following orders were created for panel order [place] Draw.  Procedure                               Abnormality         Status                     ---------                               -----------         ------                     Light Blue Top[[id number]]                                   Final result               Green Top (Gel)[[id number]]                                  Final result               Lavender Top[[id number]]                                     Final result               Gold Top - SST[[id number]]                                   Final result                 Please view results for these tests on the individual orders.    Light Blue Top [[id number]] Collected: 04/10/21 1539    Specimen: Blood Updated: 04/10/21 1645     Extra Tube hold for add-on     Comment: Auto resulted       Green Top (Gel) [[id number]] Collected: 04/10/21 1539    Specimen: Blood Updated: 04/10/21 1645       Extra Tube Hold for add-ons.     Comment: Auto resulted.       Lavender Top [[id number]] Collected: 04/10/21 1539    Specimen: Blood Updated: 04/10/21 1645     Extra Tube hold for add-on     Comment: Auto resulted       Gold Top - SST [[id number]] Collected: 04/10/21 1539    Specimen: Blood Updated: 04/10/21 1645     Extra Tube Hold for add-ons.     Comment: Auto resulted.       COVID-19 and FLU A/B PCR - Swab, Nasopharynx [[id number]]  (Normal) Collected: 04/10/21 1539    Specimen: Swab from Nasopharynx Updated: 04/10/21 1639     COVID19 Not Detected     Influenza A PCR Not Detected     Influenza B PCR Not Detected    Narrative:      Fact sheet for providers: https://www.fda.gov/media/674508/download    Fact sheet for patients: https://www.fda.gov/media/807416/download    Test performed by PCR.    Troponin [[id number]]  (Abnormal) Collected: 04/10/21 1539    Specimen: Blood Updated: 04/10/21 1631     Troponin T 0.722 ng/mL     Narrative:      Troponin T Reference Range:  <= 0.03 ng/mL-   Negative for AMI  >0.03 ng/mL-     Abnormal for myocardial necrosis.  Clinicians would have to utilize clinical acumen, EKG, Troponin and serial changes to determine if it is an Acute Myocardial Infarction or myocardial injury due to an underlying chronic condition.       Results may be falsely decreased if patient taking Biotin.      Comprehensive Metabolic Panel [[id number]]  (Abnormal) Collected: 04/10/21 1539    Specimen: Blood Updated: 04/10/21 1605     Glucose 198 mg/dL      BUN 19 mg/dL      Creatinine 1.09 mg/dL      Sodium 132 mmol/L      Potassium 3.7 mmol/L      Chloride 95 mmol/L      CO2 26.0 mmol/L      Calcium 9.7 mg/dL      Total Protein 6.7 g/dL      Albumin 4.20 g/dL      ALT (SGPT) 14 U/L      AST (SGOT) 48 U/L      Alkaline Phosphatase 74 U/L      Total Bilirubin 0.8 mg/dL      eGFR Non African Amer 65 mL/min/1.73      Globulin 2.5 gm/dL      A/G Ratio 1.7 g/dL      BUN/Creatinine Ratio 17.4     Anion Gap 11.0  mmol/L     Narrative:      GFR Normal >60  Chronic Kidney Disease <60  Kidney Failure <15      BNP [[id number]]  (Abnormal) Collected: 04/10/21 1539    Specimen: Blood Updated: 04/10/21 1603     proBNP 14,408.0 pg/mL     Narrative:      Among patients with dyspnea, NT-proBNP is highly sensitive for the detection of acute congestive heart failure. In addition NT-proBNP of <300 pg/ml effectively rules out acute congestive heart failure with 99% negative predictive value.    Results may be falsely decreased if patient taking Biotin.      CBC & Differential [[id number]]  (Abnormal) Collected: 04/10/21 1539    Specimen: Blood Updated: 04/10/21 1546    Narrative:      The following orders were created for panel order CBC & Differential.  Procedure                               Abnormality         Status                     ---------                               -----------         ------                     CBC Auto Differential[[id number]]        Abnormal            Final result                 Please view results for these tests on the individual orders.    CBC Auto Differential [[id number]]  (Abnormal) Collected: 04/10/21 1539    Specimen: Blood Updated: 04/10/21 1546     WBC 17.16 10*3/mm3      RBC 3.73 10*6/mm3      Hemoglobin 10.8 g/dL      Hematocrit 31.9 %      MCV 85.5 fL      MCH 29.0 pg      MCHC 33.9 g/dL      RDW 13.2 %      RDW-SD 41.0 fl      MPV 12.7 fL      Platelets 191 10*3/mm3      Neutrophil % 78.4 %      Lymphocyte % 10.3 %      Monocyte % 10.7 %      Eosinophil % 0.0 %      Basophil % 0.2 %      Immature Grans % 0.4 %      Neutrophils, Absolute 13.45 10*3/mm3      Lymphocytes, Absolute 1.76 10*3/mm3      Monocytes, Absolute 1.84 10*3/mm3      Eosinophils, Absolute 0.00 10*3/mm3      Basophils, Absolute 0.04 10*3/mm3      Immature Grans, Absolute 0.07 10*3/mm3      nRBC 0.0 /100 WBC         Imaging Results (Last 24 Hours)     Procedure Component Value Units Date/Time    XR Chest 1 View [[id number]]  Collected: 04/10/21 1605     Updated: 04/10/21 1634    Narrative:      PROCEDURE: XR CHEST 1 VW    VIEWS:Single    INDICATION: Shortness of breath    COMPARISON: CXR: 1/5/2018    FINDINGS:       - lines/tubes: None    - cardiac: Size within normal limits.    - mediastinum: Contour within normal limits.     - lungs: No evidence of a focal air space process. Interstitial  and airspace opacities bilaterally, may represent edema or  pneumonia  - pleura: No evidence of  fluid.      - osseous: Unremarkable for age.      Impression:      Interstitial opacities with a few scattered areas of airspace  opacification, may represent edema or pneumonia.      Electronically signed by:  Kiki Benton MD  4/10/2021 4:33 PM CDT  Workstation: [id number]            ASSESSMENT AND PLAN  Eze Downing is an 85-year-old  male with history of hypertension, diabetes mellitus, degenerative joint disease, chronic pain syndrome who has presented with a 1 week history of increasing dyspnea and intermittent chest pain with EKG showing sinus tachycardia with diffuse nonspecific ST-T changes and abnormal troponins and BNP suggesting a non-ST segment elevation myocardial infarction.  He has evidence of congestive heart failure which is most likely secondary to HFrEF.    Ischemic cardiomyopathy likely though he may have a component of pneumonia with elevated white cell count.    However, though performing a cardiac catheterization to definitively evaluate his coronary status will be desirable, his current clinical status with mental confusion, agitation and inability to lie in bed would prevent us from doing this procedure at this time.    Maximal medical management would be appropriate at this time.  I agree with continuing antiplatelet therapy with aspirin, beta blockade with carvedilol 6.25 mg twice a day, IV Lasix,  would be appropriate.  We will consider low-dose ARB if the blood pressure permits.  Since the patient is refusing blood  draws it will be prudent to change him to subacute heparin 5000 units every 8 hours.    Carvedilol could be changed to IV Lopressor 5 mg every 6 hours if he is not able to take p.o. medications.    An echocardiogram to assess left ventricular and valvular function will be arranged.  I understand that his wife is on the way and further treatment plans and CODE STATUS will need to be discussed with her.    Thank you for asked me to see this patient.    Addendum at 10 AM:  I spoke with the patient's wife at length about his cardiac status and explained his change in mental status overnight.  She fully understands and comprehends the situation.  Agrees with not proceeding with invasive work-up at this time.  The plan will be to continue maximal medical management and consider any further work-up depending on his mental status improvement.      Juli Damon MD  4/11/2021  08:03 CDT

## 2021-04-11 NOTE — NURSING NOTE
Pt family states that pt wallet is missing and contained approx $800.  RN that did the admission on pt arrival to unit states that pt was asked if he wanted to secure wallet with security at that time and patient refused.  Pt did not tell RN that he had a large amount of cash on him.  Wallet was placed in pt belonging bag with pt clothing at that time.  Pt states he got up at some point during the night and placed his wallet in his bed.  Room and belongings have been searched and wallet was not found.  Bed was changed at midnight, 0235, and 1010 am.  Security was called and notified, housekeeping, Marie, was called and notified and states they will contact the line service as all dirty linens have been picked up.

## 2021-04-11 NOTE — THERAPY EVALUATION
Acute Care - Speech Language Pathology   Swallow Initial Evaluation [facility]     Patient Name: Eze Downing  : 1935  MRN: [id number]  Today's Date: 2021               Admit Date: 4/10/2021  Pt and family at bedside educated for upright posture, slow rate, do not eat if become short of air, consuming easier to eat foods/liquids when short of breath.   Kelsi Deluca, CCC-SLP 2021 14:52 CDT    Visit Dx:     ICD-10-CM ICD-9-CM   1. Pneumonia due to infectious organism, unspecified laterality, unspecified part of lung  J18.9 486   2. NSTEMI (non-ST elevated myocardial infarction) (CMS/HCC)  I21.4 410.70   3. Oral phase dysphagia  R13.11 787.21     Patient Active Problem List   Diagnosis   • Non-recurrent unilateral inguinal hernia without obstruction or gangrene   • Retention of urine   • Enlarged prostate with urinary obstruction   • BPH (benign prostatic hyperplasia)   • Type 2 diabetes mellitus without complication, without long-term current use of insulin (CMS/[facility])   • Multiple joint pain   • Hypertension   • Gastroesophageal reflux disease   • Chronic obstructive pulmonary disease (CMS/HCC)   • Chronic low back pain without sciatica   • At high risk for falls   • Pneumonia due to infectious organism   • NSTEMI (non-ST elevated myocardial infarction) (CMS/HCC)     Past Medical History:   Diagnosis Date   • Arthritis    • Broken ribs 2018   • Diabetes mellitus (CMS/HCC)    • Hypertension    • Inguinal hernia      Past Surgical History:   Procedure Laterality Date   • APPENDECTOMY     • CYSTOSCOPY N/A 2018    Procedure: CYSTOSCOPY;  Surgeon: Michael Forbes MD;  Location: [facility] OR;  Service: Urology   • CYSTOSCOPY TRANSURETHRAL RESECTION OF PROSTATE N/A 2018    Procedure: CYSTOSCOPY TRANSURETHRAL RESECTION OF PROSTATE; PLACEMENT SUPRAPUBIC CATHETER;  Surgeon: Michael Forbes MD;  Location: [facility] OR;  Service: Urology   • INGUINAL HERNIA REPAIR Left  4/19/2018    Procedure: OPEN REPAIR OF A LARGE INGUINAL HERNIA;  Surgeon: Ramin Collado MD;  Location: [facility];  Service: General        SWALLOW EVALUATION (last 72 hours)      SLP Adult Swallow Evaluation     Row Name 04/11/21 1414                   Rehab Evaluation    Document Type  evaluation  -EC        Subjective Information  no complaints  -EC        Patient Observations  alert;cooperative;agree to therapy  -EC        Patient/Family/Caregiver Comments/Observations  daughter and wife present  -EC        Care Plan Review  evaluation/treatment results reviewed;care plan/treatment goals reviewed;risks/benefits reviewed;current/potential barriers reviewed;patient/other agree to care plan  -EC        Care Plan Review, Other Participant(s)  daughter;spouse  -EC        Patient Effort  good  -EC        Comment  pt o2 with 5L nc ranged from 84-88%  nsg informed  -EC        Symptoms Noted During/After Treatment  none  -EC           General Information    Patient Profile Reviewed  yes  -EC        Pertinent History Of Current Problem  admitted with confusion which has improved since familly is present  -EC        Current Method of Nutrition  NPO  -EC           Pain    Additional Documentation  Pain Scale: Numbers Pre/Post-Treatment (Group)  -EC           Pain Scale: Numbers Pre/Post-Treatment    Pretreatment Pain Rating  7/10  -EC        Posttreatment Pain Rating  7/10  -EC        Pain Location  back  -EC        Pre/Posttreatment Pain Comment  nsg aware  -EC           Oral Motor Structure and Function    Dentition Assessment  edentulous  -EC        Secretion Management  WNL/WFL  -EC        Mucosal Quality  moist, healthy  -EC        Gag Response  WFL  -EC        Volitional Swallow  WFL  -EC        Volitional Cough  WFL  -EC           General Eating/Swallowing Observations    Respiratory Support Currently in Use  nasal cannula 4L.  upped to 5L during eval  -EC        Eating/Swallowing Skills  self-fed;fed by SLP  -EC         Positioning During Eating  upright 90 degree;upright in bed  -EC        Utensils Used  cup;straw  -EC        Consistencies Trialed  thin liquids;soft textures  -EC        Pre SpO2 (%)  84  -EC        Post SpO2 (%)  88  -EC           Clinical Swallow Eval    Clinical Swallow Evaluation Summary  pt with increased work of breathing noted during eval.  pt with increased risk of aspiration d/t SOA.  no overt s/s of aspiration noted  -EC           Clinical Impression    Daily Summary of Progress (SLP)  progress toward functional goals as expected  -EC        Barriers to Overall Progress (SLP)  short of air  -EC        SLP Swallowing Diagnosis  functional oral phase;functional pharyngeal phase  -EC        Rehab Potential/Prognosis, Swallowing  good, to achieve stated therapy goals  -EC        Swallow Criteria for Skilled Therapeutic Interventions Met  demonstrates skilled criteria  -EC        Plan for Continued Treatment (SLP)  initiate diet.  f/u for tolerance and use of compensatory strategies  -EC           Recommendations    Therapy Frequency (Swallow)  1 day per week;2 days per week  -EC        Predicted Duration Therapy Intervention (Days)  until discharge  -EC        SLP Diet Recommendation  soft textures;ground;thin liquids  -EC        SLP Rec. for Method of Medication Administration  meds whole;with thin liquids  -EC        Monitor for Signs of Aspiration  yes;notify SLP if any concerns  -EC        Anticipated Discharge Disposition (SLP)  unknown  -EC           Swallow Goals (SLP)    Oral Nutrition/Hydration Goal Selection (SLP)  oral nutrition/hydration, SLP goal 1  -EC           Oral Nutrition/Hydration Goal 1 (SLP)    Oral Nutrition/Hydration Goal 1, SLP  pt to use compensatory strategies during PO to decrease risk of aspiration  -EC        Time Frame (Oral Nutrition/Hydration Goal 1, SLP)  by discharge  -EC        Barriers (Oral Nutrition/Hydration Goal 1, SLP)  short of air  -EC        Progress/Outcomes  (Oral Nutrition/Hydration Goal 1, SLP)  other (see comments) new goal  -EC          User Key  (r) = Recorded By, (t) = Taken By, (c) = Cosigned By    Initials Name Effective Dates    Kelsi Chaudhry CCC-SLP 02/11/20 -           EDUCATION  The patient has been educated in the following areas:   Dysphagia (Swallowing Impairment) Modified Diet Instruction.    SLP Recommendation and Plan  SLP Swallowing Diagnosis: functional oral phase, functional pharyngeal phase  SLP Diet Recommendation: soft textures, ground, thin liquids     SLP Rec. for Method of Medication Administration: meds whole, with thin liquids     Monitor for Signs of Aspiration: yes, notify SLP if any concerns     Swallow Criteria for Skilled Therapeutic Interventions Met: demonstrates skilled criteria  Anticipated Discharge Disposition (SLP): unknown  Rehab Potential/Prognosis, Swallowing: good, to achieve stated therapy goals  Therapy Frequency (Swallow): 1 day per week, 2 days per week  Predicted Duration Therapy Intervention (Days): until discharge     Daily Summary of Progress (SLP): progress toward functional goals as expected    Plan for Continued Treatment (SLP): initiate diet.  f/u for tolerance and use of compensatory strategies              Plan of Care Reviewed With: patient  Outcome Summary: ST bedside swallow evaluation completed this date.  pt was aggitated and confused requiring restraints which has improved with family being at bedside.  pt had diff maintaining 02 sats above 90% which increases risk of aspiration.  nsg aware.  SLP to f/u for use of compensatory strategies and diet toleration.    SLP GOALS     Row Name 04/11/21 1414             Oral Nutrition/Hydration Goal 1 (SLP)    Oral Nutrition/Hydration Goal 1, SLP  pt to use compensatory strategies during PO to decrease risk of aspiration  -EC      Time Frame (Oral Nutrition/Hydration Goal 1, SLP)  by discharge  -EC      Barriers (Oral Nutrition/Hydration Goal 1, SLP)  short  of air  -EC      Progress/Outcomes (Oral Nutrition/Hydration Goal 1, SLP)  other (see comments) new goal  -EC        User Key  (r) = Recorded By, (t) = Taken By, (c) = Cosigned By    Initials Name Provider Type    Kelsi Chaudhry CCC-SLP Speech and Language Pathologist             Time Calculation:   Time Calculation- SLP     Row Name 04/11/21 1450             Time Calculation- SLP    SLP Start Time  1414  -EC      SLP Stop Time  1439  -EC      SLP Time Calculation (min)  25 min  -EC      Total Timed Code Minutes- SLP  25 minute(s)  -EC      SLP Received On  04/11/21  -EC      SLP Goal Re-Cert Due Date  04/25/21  -EC        User Key  (r) = Recorded By, (t) = Taken By, (c) = Cosigned By    Initials Name Provider Type    Kelsi Chaudhry CCC-SLP Speech and Language Pathologist          Therapy Charges for Today     Code Description Service Date Service Provider Modifiers Qty    [id number] HC ST EVAL ORAL PHARYNG SWALLOW 2 4/11/2021 Kelsi Deluca CCC-SLP GN 1               JAMAR Shah  4/11/2021

## 2021-04-11 NOTE — PROGRESS NOTES
[facility]  INPATIENT PROGRESS NOTE    Length of Stay: 0  Date of Admission: 4/10/2021  Primary Care Physician: Vielka Ramirez DO    Subjective   Chief Complaint: No complaints    HPI:      4/11/2021: The patient has been confused and agitated overnight and refusing lab draws.  Wife is at bedside.  Dr. Damon spoke with the patient's wife and explained that cardiac catheterization would be desirable evaluate his coronary status, but his current clinical status with mental confusion and agitation would prevent this procedure from being done.  Medical management would be appropriate at this time.    I spoke with the patient's daughter and wife and they state the patient has been demonstrating signs of dementia for a while.  They have bought him a helmet due to his increase in falls.  They state he gets confused and agitated often, but not to this degree.    H&P by Dr. Shannon: Patient is a 85 y.o. male with a past medical history of type 2 diabetes mellitus, essential hypertension, chronic pain, osteoarthritis and BPH who presented with complaints of shortness of breath and fatigue of 1 week duration and chest pain of 1 day duration.     Patient has been having shortness of breath and easy fatigability over the last 1 week.  He also noticed fatigue. Yesterday he developed chest pain while at rest which was retrosternal and mostly involved the left side of his chest. The chest pain was burning in nature and he felt it was heartburn. His chest pain subsequently resolved. This morning he developed another episode of squeezing retrosternal and left-sided chest pain with diaphoresis that persisted. EMS was called and he was brought to the emergency room.  Patient received nitroglycerin by EMS and received aspirin in the emergency room with subsequent relief of his chest pain. He was slightly tachycardic on presentation with low normal blood pressure.  EKG  showed ST segment depressions in leads II, aVF and V5 and V6.  T wave inversions in lead II and aVF. Troponin was elevated at 0.772. His proBNP was markedly elevated at 14,408. Patient also had leukocytosis. Chest x-ray showed interstitial opacities concerning for edema or pneumonia.    Review of Systems   Unable to perform ROS: Mental status change        Objective    Temp:  [97.4 °F (36.3 °C)-98.7 °F (37.1 °C)] 98.7 °F (37.1 °C)  Heart Rate:  [] 100  Resp:  [18-22] 18  BP: (100-182)/(67-82) 124/69    Physical Exam  Constitutional:       Appearance: He is well-developed.   HENT:      Head: Normocephalic and atraumatic.   Eyes:      Pupils: Pupils are equal, round, and reactive to light.   Cardiovascular:      Rate and Rhythm: Normal rate and regular rhythm.   Pulmonary:      Effort: Pulmonary effort is normal.      Breath sounds: Normal breath sounds.   Abdominal:      General: Bowel sounds are normal.      Palpations: Abdomen is soft.   Musculoskeletal:         General: Normal range of motion.      Cervical back: Normal range of motion and neck supple.   Skin:     General: Skin is warm and dry.   Neurological:      Mental Status: He is alert.       Results Review:  I have reviewed the labs, radiology results, and diagnostic studies.    Laboratory Data:   Results from last 7 days   Lab Units 04/11/21  0554 04/10/21  1539   SODIUM mmol/L 131* 132*   POTASSIUM mmol/L 3.9 3.7   CHLORIDE mmol/L 96* 95*   CO2 mmol/L 27.0 26.0   BUN mg/dL 24* 19   CREATININE mg/dL 1.24 1.09   GLUCOSE mg/dL 168* 198*   CALCIUM mg/dL 9.0 9.7   BILIRUBIN mg/dL  --  0.8   ALK PHOS U/L  --  74   ALT (SGPT) U/L  --  14   AST (SGOT) U/L  --  48*   ANION GAP mmol/L 8.0 11.0     Estimated Creatinine Clearance: 44.1 mL/min (by C-G formula based on SCr of 1.24 mg/dL).  Results from last 7 days   Lab Units 04/10/21  1539   MAGNESIUM mg/dL 1.2*         Results from last 7 days   Lab Units 04/11/21  0555 04/10/21  1539   WBC 10*3/mm3 11.01*  17.16*   HEMOGLOBIN g/dL 9.3* 10.8*   HEMATOCRIT % 25.7* 31.9*   PLATELETS 10*3/mm3 118* 191     Results from last 7 days   Lab Units 04/10/21  1539   INR  0.98       Culture Data:   No results found for: BLOODCX  No results found for: URINECX  No results found for: RESPCX  No results found for: WOUNDCX  No results found for: STOOLCX  No components found for: BODYFLD    Radiology Data:   Imaging Results (Last 24 Hours)     Procedure Component Value Units Date/Time    XR Chest 1 View [[id number]] Collected: 04/10/21 1605     Updated: 04/10/21 1634    Narrative:      PROCEDURE: XR CHEST 1 VW    VIEWS:Single    INDICATION: Shortness of breath    COMPARISON: CXR: 1/5/2018    FINDINGS:       - lines/tubes: None    - cardiac: Size within normal limits.    - mediastinum: Contour within normal limits.     - lungs: No evidence of a focal air space process. Interstitial  and airspace opacities bilaterally, may represent edema or  pneumonia  - pleura: No evidence of  fluid.      - osseous: Unremarkable for age.      Impression:      Interstitial opacities with a few scattered areas of airspace  opacification, may represent edema or pneumonia.      Electronically signed by:  Kiki Benton MD  4/10/2021 4:33 PM CDT  Workstation: [id number]          I have reviewed the patient's current medications.     Assessment/Plan     Active Hospital Problems    Diagnosis  POA   • **NSTEMI (non-ST elevated myocardial infarction) (CMS/HCC) [I21.4]  Yes   • Pneumonia due to infectious organism [J18.9]  Yes   • Hypertension [I10]  Yes   • Type 2 diabetes mellitus without complication, without long-term current use of insulin (CMS/[facility]) [E11.9]  Yes       Plan:    1.  NSTEMI: Cardiology consult appreciated.  Due to the patient's confusion and agitation, coronary angiogram cannot be performed at this time.  Continue antiplatelet therapy with aspirin, carvedilol 6.25 mg twice a day and IV Lasix.  Since the patient is refusing lab draws, heparin drip  has been discontinued subcutaneous heparin has been started 5000 units every 8 hours.  Echocardiogram pending.  2.  Pneumonia, bilateral: Continue IV Rocephin and doxycycline.  Continue DuoNebs.  Incentive spirometry.  3.  Delirium: Likely in the setting of dementia.  Safety precautions.  Continue restraints for safety.  Recommend family to stay with him as much as possible.  Will have speech therapy assess his swallow and recommend diet.  We will start physical and occupational therapy once the patient is less confused.  4.  Diabetes mellitus, type II: Continue SSI.  5.  BPH: Continue home Proscar and Flomax.  6.  Acute respiratory failure with hypoxia:  Patient currently on 3-4 liters. Will continue to wean as appropriate.       Discharge Planning: I expect patient to be discharged to home in 2-3 days.      This document has been electronically signed by MART Reyes on April 11, 2021 11:19 CDT

## 2021-04-11 NOTE — PLAN OF CARE
Goal Outcome Evaluation:  Plan of Care Reviewed With: patient     Outcome Summary: ST bedside swallow evaluation completed this date.  pt was aggitated and confused requiring restraints which has improved with family being at bedside.  pt had diff maintaining 02 sats above 90% which increases risk of aspiration.  nsg aware.  SLP to f/u for use of compensatory strategies and diet toleration.

## 2021-04-12 NOTE — PROGRESS NOTES
"[facility] Progress Note   LOS: 0 days   Patient Care Team:  Vielka Ramirez DO as PCP - General    Chief Complaint:    Chief Complaint   Patient presents with    Shortness of Breath    Chest Pain    Heartburn        Subjective     Interval History:   Patient Denies: chest pain  Patient Complaints: shortness of air  History taken from: patient chart family     Patient alert and calm today.  Wife is at bedside.  Continues to have mild tachycardia.  Coreg was increased.  Will add digoxin p.o. today.  Plan is for left heart catheterization tomorrow evening.  Patient will be n.p.o. after light breakfast tomorrow.    Objective     Vital Sign Min/Max for last 24 hours  Temp  Min: 97.3 °F (36.3 °C)  Max: 98.2 °F (36.8 °C)   BP  Min: 99/63  Max: 129/83   Pulse  Min: 101  Max: 122   Resp  Min: 18  Max: 29   SpO2  Min: 90 %  Max: 96 %   Flow (L/min)  Min: 3  Max: 5   Weight  Min: 69.1 kg (152 lb 5.4 oz)  Max: 69.1 kg (152 lb 5.4 oz)     Flowsheet Rows        First Filed Value   Admission Height  170.2 cm (67\") Documented at 04/10/2021 1525   Admission Weight  68 kg (150 lb) Documented at 04/10/2021 1525              04/10/21  1525 04/11/21  0601 04/11/21  1606   Weight: 68 kg (150 lb) 69.1 kg (152 lb 6.4 oz) 69.1 kg (152 lb 5.4 oz)       Physical Exam:  Physical Exam  Vitals and nursing note reviewed.   Constitutional:       General: He is not in acute distress.     Appearance: Normal appearance. He is well-developed. He is not diaphoretic.      Interventions: Nasal cannula in place.   HENT:      Head: Normocephalic and atraumatic.      Right Ear: External ear normal.      Left Ear: External ear normal.   Eyes:      General: Lids are normal.      Conjunctiva/sclera: Conjunctivae normal.      Pupils: Pupils are equal, round, and reactive to light.   Neck:      Vascular: No carotid bruit.   Cardiovascular:      Rate and Rhythm: Regular rhythm. Tachycardia present.      Chest Wall: PMI is not displaced.      Heart sounds: S1 " normal and S2 normal. No murmur heard.   No friction rub. No gallop. No S3 sounds.    Pulmonary:      Effort: Pulmonary effort is normal. No respiratory distress.      Breath sounds: No stridor. Wheezing and rales present.   Chest:      Chest wall: No tenderness.   Abdominal:      General: Bowel sounds are normal. There is no distension.      Palpations: Abdomen is soft.      Tenderness: There is no abdominal tenderness.   Musculoskeletal:      Cervical back: Normal range of motion and neck supple.      Right lower leg: No edema.      Left lower leg: No edema.   Skin:     General: Skin is warm and dry.      Capillary Refill: Capillary refill takes 2 to 3 seconds.      Findings: No erythema or rash.   Neurological:      Mental Status: He is alert. Mental status is at baseline.      Cranial Nerves: No cranial nerve deficit.      Comments: Intermittent confusion   Psychiatric:      Comments: Intermittent confusion          Results Review:     Results from last 7 days   Lab Units 04/12/21  0606 04/11/21  0554 04/10/21  1539   SODIUM mmol/L 133* 131* 132*   POTASSIUM mmol/L 3.8 3.9 3.7   CHLORIDE mmol/L 95* 96* 95*   CO2 mmol/L 25.0 27.0 26.0   BUN mg/dL 36* 24* 19   CREATININE mg/dL 1.29* 1.24 1.09   CALCIUM mg/dL 9.2 9.0 9.7   BILIRUBIN mg/dL  --   --  0.8   ALK PHOS U/L  --   --  74   ALT (SGPT) U/L  --   --  14   AST (SGOT) U/L  --   --  48*   GLUCOSE mg/dL 187* 168* 198*       Estimated Creatinine Clearance: 42.4 mL/min (A) (by C-G formula based on SCr of 1.29 mg/dL (H)).    Results from last 7 days   Lab Units 04/10/21  1539   MAGNESIUM mg/dL 1.2*             Results from last 7 days   Lab Units 04/12/21  0606 04/11/21  0555 04/10/21  1539   WBC 10*3/mm3 13.22* 11.01* 17.16*   HEMOGLOBIN g/dL 9.6* 9.3* 10.8*   PLATELETS 10*3/mm3 139* 118* 191       Results from last 7 days   Lab Units 04/10/21  1539   INR  0.98     Lab Results   Component Value Date    PROBNP 14,408.0 (H) 04/10/2021       I/O last 3 completed  shifts:  In: 840 [P.O.:840]  Out: -     Cardiographics:  ECG/EMG Results (last 24 hours)       Procedure Component Value Units Date/Time    Adult Transthoracic Echo Complete W/ Cont if Necessary Per Protocol [[id number]] Collected: 04/11/21 1447     Updated: 04/11/21 1808     BSA 1.8 m^2      RVIDd 2.7 cm      IVSd 1.1 cm      LVIDd 5.5 cm      LVIDs 4.9 cm      LVPWd 0.88 cm      IVS/LVPW 1.2     FS 11.1 %      EDV(Teich) 147.4 ml      ESV(Teich) 112.3 ml      EF(Teich) 23.8 %      EDV(cubed) 166.4 ml      ESV(cubed) 116.9 ml      EF(cubed) 29.7 %      LV mass(C)d 208.3 grams      LV mass(C)dI 115.7 grams/m^2      SV(Teich) 35.1 ml      SI(Teich) 19.5 ml/m^2      SV(cubed) 49.4 ml      SI(cubed) 27.5 ml/m^2      Ao root diam 3.5 cm      Ao root area 9.6 cm^2      ACS 1.8 cm      LA dimension 4.0 cm      asc Aorta Diam 3.2 cm      LA/Ao 1.1     LVOT diam 1.9 cm      LVOT area 2.8 cm^2      LVOT area(traced) 2.8 cm^2      LVLd ap4 8.9 cm      EDV(MOD-sp4) 136.0 ml      LVLs ap4 9.0 cm      ESV(MOD-sp4) 95.9 ml      EF(MOD-sp4) 29.5 %      LVLd ap2 9.3 cm      EDV(MOD-sp2) 145.0 ml      LVLs ap2 9.0 cm      ESV(MOD-sp2) 130.0 ml      EF(MOD-sp2) 10.3 %      SV(MOD-sp4) 40.1 ml      SI(MOD-sp4) 22.3 ml/m^2      SV(MOD-sp2) 15.0 ml      SI(MOD-sp2) 8.3 ml/m^2      Ao root area (BSA corrected) 1.9     LV Tim Vol (BSA corrected) 75.6 ml/m^2      LV Sys Vol (BSA corrected) 53.3 ml/m^2      MV E max leonarda 90.0 cm/sec      MV A max leonarda 112.0 cm/sec      MV E/A 0.8     MV V2 max 120.0 cm/sec      MV max PG 5.8 mmHg      MV V2 mean 91.5 cm/sec      MV mean PG 4.0 mmHg      MV V2 VTI 17.0 cm      MVA(VTI) 1.8 cm^2      MV P1/2t max leonarda 104.0 cm/sec      MV P1/2t 42.7 msec      MVA(P1/2t) 5.2 cm^2      MV dec slope 714.0 cm/sec^2      Ao pk leonarda 128.0 cm/sec      Ao max PG 6.6 mmHg      Ao max PG (full) 2.6 mmHg      Ao V2 mean 90.1 cm/sec      Ao mean PG 4.0 mmHg      Ao mean PG (full) 2.0 mmHg      Ao V2 VTI 16.8 cm      MARKIE(I,A)  1.8 cm^2      MARKIE(I,D) 1.8 cm^2      MARKIE(V,A) 2.2 cm^2      MARKIE(V,D) 2.2 cm^2      LV V1 max PG 4.0 mmHg      LV V1 mean PG 2.0 mmHg      LV V1 max 99.9 cm/sec      LV V1 mean 60.7 cm/sec      LV V1 VTI 10.9 cm      MR max leonarda 385.0 cm/sec      MR max PG 59.3 mmHg      SV(Ao) 161.6 ml      SI(Ao) 89.8 ml/m^2      SV(LVOT) 30.9 ml      SI(LVOT) 17.2 ml/m^2      PA V2 max 104.0 cm/sec      PA max PG 4.3 mmHg      TR max leonarda 366.0 cm/sec      RVSP(TR) 63.6 mmHg      RAP systole 10.0 mmHg      MVA P1/2T LCG 2.1 cm^2       CV ECHO CRISTIANA - BZI_BMI 23.8 kilograms/m^2       CV ECHO CRISTIANA - BSA(HAYCOCK) 1.8 m^2       CV ECHO CRISTIANA - BZI_METRIC_WEIGHT 68.9 kg       CV ECHO CRISTIANA - BZI_METRIC_HEIGHT 170.2 cm      Target HR (85%) 116 bpm      Max. Pred. HR (100%) 137 bpm      Echo EF Estimated 25 %     Narrative:      · Estimated left ventricular EF = 25% Left ventricular ejection fraction   appears to be 21 - 25%. Left ventricular systolic function is moderately   decreased. The left ventricular cavity is mildly dilated. Mid/ Distal   Septum, [place], Anterior wall severely hypokinetic/ dyskinetic Left   ventricular diastolic function is consistent with (grade Ia w/high LAP)   impaired relaxation.  · Mild to moderate mitral valve regurgitation is present.  · Estimated right ventricular systolic pressure from tricuspid   regurgitation is markedly elevated (>55 mmHg).             Results for orders placed during the hospital encounter of 04/10/21    Adult Transthoracic Echo Complete W/ Cont if Necessary Per Protocol    Interpretation Summary  · Estimated left ventricular EF = 25% Left ventricular ejection fraction appears to be 21 - 25%. Left ventricular systolic function is moderately decreased. The left ventricular cavity is mildly dilated. Mid/ Distal Septum, [place], Anterior wall severely hypokinetic/ dyskinetic Left ventricular diastolic function is consistent with (grade Ia w/high LAP) impaired relaxation.  · Mild to  moderate mitral valve regurgitation is present.  · Estimated right ventricular systolic pressure from tricuspid regurgitation is markedly elevated (>55 mmHg).      Imaging Results (Most Recent)       Procedure Component Value Units Date/Time    XR Chest 1 View [[id number]] Collected: 04/10/21 1605     Updated: 04/10/21 1634    Narrative:      PROCEDURE: XR CHEST 1 VW    VIEWS:Single    INDICATION: Shortness of breath    COMPARISON: CXR: 1/5/2018    FINDINGS:       - lines/tubes: None    - cardiac: Size within normal limits.    - mediastinum: Contour within normal limits.     - lungs: No evidence of a focal air space process. Interstitial  and airspace opacities bilaterally, may represent edema or  pneumonia  - pleura: No evidence of  fluid.      - osseous: Unremarkable for age.      Impression:      Interstitial opacities with a few scattered areas of airspace  opacification, may represent edema or pneumonia.      Electronically signed by:  Kiki Benton MD  4/10/2021 4:33 PM CDT  Workstation: [id number]            XR Chest 1 View    Result Date: 4/10/2021  Interstitial opacities with a few scattered areas of airspace opacification, may represent edema or pneumonia. Electronically signed by:  Kiki Benton MD  4/10/2021 4:33 PM CDT Workstation: [id number]      Medication Review:     Current Facility-Administered Medications:     acetaminophen (TYLENOL) tablet 650 mg, 650 mg, Oral, Q4H PRN, Warren Shannon MD    aspirin EC tablet 81 mg, 81 mg, Oral, Daily, Warren Shannon MD, 81 mg at 04/12/21 0810    calcium carbonate (TUMS) chewable tablet 500 mg (200 mg elemental), 2 tablet, Oral, Q4H PRN, Imer Chappell MD, 2 tablet at 04/12/21 0959    carvedilol (COREG) tablet 12.5 mg, 12.5 mg, Oral, BID With Meals, Levill, Cha G, APRN, 12.5 mg at 04/12/21 0810    cefTRIAXone (ROCEPHIN) 1 g/100 mL 0.9% NS (MBP), 1 g, Intravenous, Q24H, Warren Shannon MD, Stopped at 04/12/21 0222    dextrose (D50W) 25 g/ 50mL  Intravenous Solution 25 g, 25 g, Intravenous, Q15 Min PRN, Warren Shannon MD    dextrose (GLUTOSE) oral gel 15 g, 15 g, Oral, Q15 Min PRN, Warren Shannon MD    digoxin (LANOXIN) tablet 125 mcg, 125 mcg, Oral, Daily, Sera Fergusno, APRN    doxycycline (VIBRAMYCIN) 100 mg/100 mL 0.9% NS MBP, 100 mg, Intravenous, Q12H, Warren Shannon MD, Stopped at 04/12/21 0925    finasteride (PROSCAR) tablet 5 mg, 5 mg, Oral, Daily, Warren Shannon MD, 5 mg at 04/12/21 0810    furosemide (LASIX) injection 40 mg, 40 mg, Intravenous, Q12H, Cha Garay, APRN    glucagon (human recombinant) (GLUCAGEN DIAGNOSTIC) injection 1 mg, 1 mg, Subcutaneous, Q15 Min PRN, Warren Shannon MD    heparin (porcine) 5000 UNIT/ML injection 5,000 Units, 5,000 Units, Subcutaneous, Q8H, Juli Damon MD, 5,000 Units at 04/12/21 0553    HYDROcodone-acetaminophen (NORCO)  MG per tablet 1 tablet, 1 tablet, Oral, Q6H PRN, Warren Shannon MD, 1 tablet at 04/11/21 1554    insulin aspart (novoLOG) injection 0-7 Units, 0-7 Units, Subcutaneous, TID AC, Warren Shannon MD, 2 Units at 04/12/21 0809    ipratropium-albuterol (DUO-NEB) nebulizer solution 3 mL, 3 mL, Nebulization, Q4H PRN, Imer Chappell MD, 3 mL at 04/11/21 1536    morphine injection 2 mg, 2 mg, Intravenous, Q2H PRN, 2 mg at 04/12/21 0809 **AND** naloxone (NARCAN) injection 0.4 mg, 0.4 mg, Intravenous, Q5 Min PRN, Warren Shannon MD    nitroglycerin (NITROSTAT) SL tablet 0.4 mg, 0.4 mg, Sublingual, Q5 Min PRN, Warren Shannon MD    prochlorperazine (COMPAZINE) injection 5 mg, 5 mg, Intravenous, Q6H PRN, Warren Shannon MD, 5 mg at 04/11/21 2039    sodium chloride 0.9 % flush 10 mL, 10 mL, Intravenous, PRN, Warren Shannon MD    sodium chloride 0.9 % flush 10 mL, 10 mL, Intravenous, Q12H, Warren Shannon MD, 10 mL at 04/12/21 0808    sodium chloride 0.9 % flush 10 mL, 10 mL, Intravenous, PRN, Warren Shannon MD     tamsulosin (FLOMAX) 24 hr capsule 0.4 mg, 0.4 mg, Oral, Nightly, Warren Shannon MD, 0.4 mg at 04/11/21 2040    Assessment/Plan       NSTEMI (non-ST elevated myocardial infarction) (CMS/[facility])    Type 2 diabetes mellitus without complication, without long-term current use of insulin (CMS/HCC)    Hypertension    Pneumonia due to infectious organism    NSTEMI: Mr. Eze Downing is a 85-year-old  male with past medical history of type 2 diabetes, hypertension, degenerative disc disease, chronic pain, BPH who presented to the ER with shortness of breath and intermittent chest pain.  EKG was performed showing sinus tachycardia with right bundle branch block, rightward axis, diffuse ST-T changes with nonspecific findings.  Troponin levels have been elevated at 0.722 on admission and and peaked at 1.930 consistent with non-ST segment elevation myocardial infarction.  Left heart catheterization was postponed as patient was agitated and confused yesterday.  Lengthy/detailed discussion of options with wife.  Options were discussed including noninvasive medical management versus invasive work-up.  Wife fully understands and comprehends the risk and benefits and wishes for invasive left heart catheterization.  Patient is slightly short of breath and wet today.  We will diurese today.  Plan for left heart catheterization tomorrow.    -C tomorrow evening.    -NPO after light breakfast in AM.    The indications, risks and benefits of diagnostic left heart cardiac catheterization, angiography, conscious sedation, and possible blood transfusion were discussed in detail with the patient. The potential complications of 1/2000 chance of death, 1/1000 chance of heart attack or stroke, 1/500 chance of bleeding or clotting of the femoral artery, and 1/500 chance of allergic reaction to contrast were discussed. We also reviewed possible complications of infection and kidney dysfunction. If PCI were performed and intra-coronary  stents indicated, we discussed the details about  MOLLY. This included a review of the risks of the infrequent, but relatively higher incidence of late thrombosis with MOLLY. The importance of maintaining a consistent daily regimen of aspirin and an additional anti-platelet agent  for as long as directed after implantation was emphasized. No contraindications were found.  The patient  appeared to understand and agree to the above.  [facility] with Dr. Damon.     ASA 3, Mallampati 2.     -Continue aspirin 81 mg daily.  -Continue Coreg 12.5 mg twice daily  -Continue subacute heparin 5000 units every 8 hours.    first presentation of severe systolic heart failure. Etiology: likely ICM. LVEF 25%.  NYHA stage C. FC-IV.   Patient is currently volume overloaded. and with adequate perfusion. The patient's hemodynamics are currently unacceptable.- tachycardia  -BB: Coreg 12.5 mg twice daily  -ACE/ARB/ENTRESTO: Hold for now due to kidney function planning for cath; likely will add prior to discharge  - DIGOXIN: Start 125mcg  - IMDUR/HYDRALAZINE: n/a  -DIURETIC: Lasix 40 mg twice daily  -MRA: The patient is FC-NYHA Class IV and MRA is indicated.   - 6MWT: Will complete as outpatient  -Fluid restriction: 1500 mL a day  -Sodium restriction: 2000 mg day   -ICD:  Indicated.   QRS: 130ms    Bilateral pneumonia: Continue IV antibiotics per primary.  DuoNebs.    Delirium: Likely in the setting of dementia.  Improved today.    Diabetes mellitus type 2: Continue sliding scale insulin    Acute respiratory failure with hypoxia: Currently has stable at 3 to 4 L.  Wean as condition will allow.    Plan for disposition: Continue current location.            This document has been electronically signed by MART Zamorano on April 12, 2021 11:09 CDT     Electronically signed by MART Zamorano, 04/12/21, 11:23 AM CDT.    Mr. Eze Downing is a 85-year-old  male with past medical history of type 2 diabetes, hypertension,  degenerative disc disease, chronic pain, BPH who presented to the ER with shortness of breath and intermittent chest pain.  EKG was performed showing sinus tachycardia with right bundle branch block, rightward axis, diffuse ST-T changes with nonspecific findings.  Troponin levels have been elevated at 0.722 on admission and and peaked at 1.930 consistent with non-ST segment elevation myocardial infarction.  The patient was quite agitated yesterday and confused, crawling out of bed.  This however seems to have improved overnight.    Patient much more alert today.  Patient appropriately responding to question.  Not agitated.  Denied any chest pain or shortness of breath.    Echocardiogram performed yesterday showed:  Estimated left ventricular EF = 25% Left ventricular ejection fraction appears to be 21 - 25%. Left ventricular systolic function is moderately decreased. The left ventricular cavity is mildly dilated. Mid/ Distal Septum, [place], Anterior wall severely hypokinetic/ dyskinetic Left ventricular diastolic function is consistent with (grade Ia w/high LAP) impaired relaxation.  Mild to moderate mitral valve regurgitation is present.  Estimated right ventricular systolic pressure from tricuspid regurgitation is markedly elevated (>55 mmHg).    On examination heart rate was 96 bpm regular,  blood pressure 110/75 mmHg  Exam of the heart showed normal first and second heart sounds with no S3 or S4.  No heart murmurs or pericardial rub.  Lung exam showed increased AP diameter of the chest, normal breath sounds with few bibasilar rales audible.  Abdomen was soft with no mass or tenderness.  Periphery showed no edema.  No neurological deficit.    Labs reviewed in detail.  Troponin 1.8 today.  BUN 36 and creatinine 1.29.  Hemoglobin 9.6 and platelet 139    I had a long discussion with the patient and his wife.  Definitive evaluation by cardiac catheterization will be appropriate.  Pros and cons discussed in detail.   They are fully aware of the increased risk of respiratory failure requiring intubation/ventilation, CHF, worsening renal function.  They wish to proceed with cardiac catheterization.  Patient will be scheduled for tomorrow.  All risks and benefits have been explained in detail and he will be ASA 3 and Mallampati 2    Continue present management with carvedilol 12.5 mg twice a day, digoxin 125 mcg daily, Lasix 40 mg IV twice daily.  Patient on subcutaneous heparin.  Antiplatelet therapy with aspirin.  Crestor 10 mg daily will be started.      Electronically signed by Juli Damon MD, 04/12/21, 12:12 PM CDT.

## 2021-04-12 NOTE — PLAN OF CARE
Goal Outcome Evaluation:  Plan of Care Reviewed With: patient  Progress: no change   Pt was disoriented but manageable today with the help of family staying in the room. Heart rate continues to be above 100. Digoxin was added to med list today. Pt will be allowed a light breakfast Tuesday morning and then kept NPO for a heart cath Tuesday afternoon.

## 2021-04-12 NOTE — THERAPY TREATMENT NOTE
Acute Care - Speech Language Pathology   Swallow Treatment Note [facility]     Patient Name: Eze Downing  : 1935  MRN: [id number]  Today's Date: 2021               Admit Date: 4/10/2021     Goal:  Patient will safely tolerate least restricted diet w/no overt s/s of aspiration for adequate nutrition and hydration:  Pt seen for skilled ST services this date to address oropharyngeal dysphagia. Pt was seen w/AM meal and was able to self feed w/tray setup by SLP student. Pt consumed thin liquids via cup w/no overt s/s of aspiration. Pt consumed singled trial of thin liquids via straw w/cough. Pt consumed mech soft solids (biscuit w/gravy, scrambled eggs, potato wedges) w/prolonged mastication and delayed AP transit d/t lack of dentition. Pt demonstrated mild oral residue, but cleared indpendently w/liquid wash. SLP recommends additional f/u for diet safety/toleration and education/implementation of compensatory strategies. Continue current diet at this time and encouraged pt to increase PO intake. Pt and wife educated on POC and safe swallowing strategies. Pt's wife was able to repeat safe swallowing strategies and in agreement w/recommendations.      Visit Dx:     ICD-10-CM ICD-9-CM   1. Pneumonia due to infectious organism, unspecified laterality, unspecified part of lung  J18.9 486   2. NSTEMI (non-ST elevated myocardial infarction) (CMS/HCC)  I21.4 410.70   3. Oral phase dysphagia  R13.11 787.21     Patient Active Problem List   Diagnosis   • Non-recurrent unilateral inguinal hernia without obstruction or gangrene   • Retention of urine   • Enlarged prostate with urinary obstruction   • BPH (benign prostatic hyperplasia)   • Type 2 diabetes mellitus without complication, without long-term current use of insulin (CMS/[facility])   • Multiple joint pain   • Hypertension   • Gastroesophageal reflux disease   • Chronic obstructive pulmonary disease (CMS/HCC)   • Chronic low back pain without sciatica    • At high risk for falls   • Pneumonia due to infectious organism   • NSTEMI (non-ST elevated myocardial infarction) (CMS/[facility])     Past Medical History:   Diagnosis Date   • Arthritis    • Broken ribs 01/05/2018   • Diabetes mellitus (CMS/[facility])    • Hypertension    • Inguinal hernia      Past Surgical History:   Procedure Laterality Date   • APPENDECTOMY  1980's   • CYSTOSCOPY N/A 4/25/2018    Procedure: CYSTOSCOPY;  Surgeon: Michael Forbes MD;  Location: [facility];  Service: Urology   • CYSTOSCOPY TRANSURETHRAL RESECTION OF PROSTATE N/A 5/9/2018    Procedure: CYSTOSCOPY TRANSURETHRAL RESECTION OF PROSTATE; PLACEMENT SUPRAPUBIC CATHETER;  Surgeon: Michael Forbes MD;  Location: [facility];  Service: Urology   • INGUINAL HERNIA REPAIR Left 4/19/2018    Procedure: OPEN REPAIR OF A LARGE INGUINAL HERNIA;  Surgeon: Ramin Collado MD;  Location: [facility];  Service: General        SWALLOW EVALUATION (last 72 hours)      SLP Adult Swallow Evaluation     Row Name 04/12/21 0716 04/11/21 0594                Rehab Evaluation    Document Type  therapy note (daily note)  -CK  evaluation  -EC       Total Evaluation Minutes, SLP  40  -CK  --       Subjective Information  --  no complaints  -EC       Patient Observations  --  alert;cooperative;agree to therapy  -EC       Patient/Family/Caregiver Comments/Observations  --  daughter and wife present  -EC       Care Plan Review  --  evaluation/treatment results reviewed;care plan/treatment goals reviewed;risks/benefits reviewed;current/potential barriers reviewed;patient/other agree to care plan  -EC       Care Plan Review, Other Participant(s)  --  daughter;spouse  -EC       Patient Effort  good  -CK  good  -EC       Comment  --  pt o2 with 5L nc ranged from 84-88%  nsg informed  -EC       Symptoms Noted During/After Treatment  none  -CK  none  -EC          General Information    Patient Profile Reviewed  yes  -CK  yes  -EC       Pertinent History Of Current Problem  --  admitted  with confusion which has improved since familly is present  -EC       Current Method of Nutrition  soft textures;ground;thin liquids  -CK  NPO  -EC          Pain    Additional Documentation  --  Pain Scale: Numbers Pre/Post-Treatment (Group)  -EC          Pain Scale: Numbers Pre/Post-Treatment    Pretreatment Pain Rating  --  7/10  -EC       Posttreatment Pain Rating  --  7/10  -EC       Pain Location  --  back  -EC       Pre/Posttreatment Pain Comment  --  nsg aware  -EC          Oral Motor Structure and Function    Dentition Assessment  edentulous  -CK  edentulous  -EC       Secretion Management  WNL/WFL  -CK  WNL/WFL  -EC       Mucosal Quality  moist, healthy  -CK  moist, healthy  -EC       Gag Response  WFL  -CK  WFL  -EC       Volitional Swallow  WFL  -CK  WFL  -EC       Volitional Cough  WFL  -CK  WFL  -EC          General Eating/Swallowing Observations    Respiratory Support Currently in Use  nasal cannula  -CK  nasal cannula 4L.  upped to 5L during eval  -EC       Eating/Swallowing Skills  self-fed  -CK  self-fed;fed by SLP  -EC       Positioning During Eating  upright 90 degree;upright in bed  -CK  upright 90 degree;upright in bed  -EC       Utensils Used  cup;straw  -CK  cup;straw  -EC       Consistencies Trialed  thin liquids;soft textures  -CK  thin liquids;soft textures  -EC       Pre SpO2 (%)  --  84  -EC       Post SpO2 (%)  --  88  -EC          Respiratory    Respiratory Status  increase in respiratory rate;during swallowing/eating  -CK  --          Clinical Swallow Eval    Oral Prep Phase  impaired  -CK  --       Oral Transit  impaired  -CK  --       Oral Residue  impaired  -CK  --       Pharyngeal Phase  suspected pharyngeal impairment  -CK  --       Esophageal Phase  unremarkable  -CK  --       Clinical Swallow Evaluation Summary  --  pt with increased work of breathing noted during eval.  pt with increased risk of aspiration d/t SOA.  no overt s/s of aspiration noted  -EC          Clinical  Impression    Daily Summary of Progress (SLP)  progress toward functional goals as expected  -CK  progress toward functional goals as expected  -EC       Barriers to Overall Progress (SLP)  SOA  -CK  short of air  -EC       SLP Swallowing Diagnosis  functional oral phase;functional pharyngeal phase  -CK  functional oral phase;functional pharyngeal phase  -EC       Rehab Potential/Prognosis, Swallowing  good, to achieve stated therapy goals  -CK  good, to achieve stated therapy goals  -EC       Swallow Criteria for Skilled Therapeutic Interventions Met  demonstrates skilled criteria  -CK  demonstrates skilled criteria  -EC       Plan for Continued Treatment (SLP)  f/u for additional session for diet safety d/t limited PO intake  -CK  initiate diet.  f/u for tolerance and use of compensatory strategies  -EC          Recommendations    Therapy Frequency (Swallow)  1 day per week;2 days per week  -CK  1 day per week;2 days per week  -EC       Predicted Duration Therapy Intervention (Days)  until discharge  -CK  until discharge  -EC       SLP Diet Recommendation  soft textures;ground;thin liquids  -CK  soft textures;ground;thin liquids  -EC       SLP Rec. for Method of Medication Administration  meds whole;with thin liquids  -CK  meds whole;with thin liquids  -EC       Monitor for Signs of Aspiration  yes;notify SLP if any concerns  -CK  yes;notify SLP if any concerns  -EC       Anticipated Discharge Disposition (SLP)  unknown  -CK  unknown  -EC          Swallow Goals (SLP)    Oral Nutrition/Hydration Goal Selection (SLP)  oral nutrition/hydration, SLP goal 1  -CK  oral nutrition/hydration, SLP goal 1  -EC          Oral Nutrition/Hydration Goal 1 (SLP)    Oral Nutrition/Hydration Goal 1, SLP  pt to use compensatory strategies during PO to decrease risk of aspiration  -CK  pt to use compensatory strategies during PO to decrease risk of aspiration  -EC       Time Frame (Oral Nutrition/Hydration Goal 1, SLP)  by discharge   -CK  by discharge  -EC       Barriers (Oral Nutrition/Hydration Goal 1, SLP)  short of air  -CK  short of air  -EC       Progress/Outcomes (Oral Nutrition/Hydration Goal 1, SLP)  goal partially met  -CK  other (see comments) new goal  -EC         User Key  (r) = Recorded By, (t) = Taken By, (c) = Cosigned By    Initials Name Effective Dates    Kelsi Chaudhry, CCC-SLP 02/11/20 -     CK Carlee De La Cruz, MS CCC-SLP 02/11/20 -           EDUCATION  The patient has been educated in the following areas:   Dysphagia (Swallowing Impairment) Modified Diet Instruction.    SLP Recommendation and Plan  SLP Swallowing Diagnosis: functional oral phase, functional pharyngeal phase  SLP Diet Recommendation: soft textures, ground, thin liquids     SLP Rec. for Method of Medication Administration: meds whole, with thin liquids     Monitor for Signs of Aspiration: yes, notify SLP if any concerns     Swallow Criteria for Skilled Therapeutic Interventions Met: demonstrates skilled criteria  Anticipated Discharge Disposition (SLP): unknown  Rehab Potential/Prognosis, Swallowing: good, to achieve stated therapy goals  Therapy Frequency (Swallow): 1 day per week, 2 days per week  Predicted Duration Therapy Intervention (Days): until discharge     Daily Summary of Progress (SLP): progress toward functional goals as expected    Plan for Continued Treatment (SLP): f/u for additional session for diet safety d/t limited PO intake              Plan of Care Reviewed With: patient, spouse  Progress: no change  Outcome Summary: Pt seen for skilled ST services this date to address oropharyngeal dysphagia.  Pt was seen w/AM meal and was able to self feed w/tray setup by SLP student.  Pt consumed thin liquids via cup w/no overt s/s of aspiration.  Pt consumed singled trial of thin liquids via straw w/cough.  Pt consumed mech soft solids (biscuit w/gravy, scrambled eggs, potato wedges) w/prolonged mastication and delayed AP transit d/t lack  of dentition.  Pt demonstrated mild oral residue, but cleared indpendently w/liquid wash.  SLP recommends additional f/u for diet safety/toleration and education/implementation of compensatory strategies.  Continue current diet at this time and encouraged pt to increase PO intake.  Pt and wife educated on POC and safe swallowing strategies.  Pt's wife was able to repeat safe swallowing strategies and in agreement w/recommendations.    SLP GOALS     Row Name 04/12/21 0716 04/11/21 1414          Oral Nutrition/Hydration Goal 1 (SLP)    Oral Nutrition/Hydration Goal 1, SLP  pt to use compensatory strategies during PO to decrease risk of aspiration  -CK  pt to use compensatory strategies during PO to decrease risk of aspiration  -EC     Time Frame (Oral Nutrition/Hydration Goal 1, SLP)  by discharge  -CK  by discharge  -EC     Barriers (Oral Nutrition/Hydration Goal 1, SLP)  short of air  -CK  short of air  -EC     Progress/Outcomes (Oral Nutrition/Hydration Goal 1, SLP)  goal partially met  -CK  other (see comments) new goal  -EC       User Key  (r) = Recorded By, (t) = Taken By, (c) = Cosigned By    Initials Name Provider Type    Kelsi Chaudhry CCC-SLP Speech and Language Pathologist    Carlee Bray, MS CCC-SLP Speech and Language Pathologist             Time Calculation:   Time Calculation- SLP     Row Name 04/12/21 0756             Time Calculation- SLP    SLP Start Time  0716  -CK      SLP Stop Time  0756  -CK      SLP Time Calculation (min)  40 min  -CK      Total Timed Code Minutes- SLP  40 minute(s)  -CK      SLP Received On  04/12/21  -CK      SLP Goal Re-Cert Due Date  04/25/21  -CK        User Key  (r) = Recorded By, (t) = Taken By, (c) = Cosigned By    Initials Name Provider Type    Carlee Bray, MS CCC-SLP Speech and Language Pathologist          Therapy Charges for Today     Code Description Service Date Service Provider Modifiers Qty    [id number] HC ST TREATMENT SWALLOW 3  4/12/2021 Carlee De La Cruz, MS CCC-SLP GN 1               Carlee De La Cruz, MS COHEN-SLP  4/12/2021

## 2021-04-12 NOTE — PLAN OF CARE
Goal Outcome Evaluation:  Plan of Care Reviewed With: patient, spouse  Progress: no change  Outcome Summary: Pt seen for skilled ST services this date to address oropharyngeal dysphagia.  Pt was seen w/AM meal and was able to self feed w/tray setup by SLP student.  Pt consumed thin liquids via cup w/no overt s/s of aspiration.  Pt consumed singled trial of thin liquids via straw w/cough.  Pt consumed mech soft solids (biscuit w/gravy, scrambled eggs, potato wedges) w/prolonged mastication and delayed AP transit d/t lack of dentition.  Pt demonstrated mild oral residue, but cleared indpendently w/liquid wash.  SLP recommends additional f/u for diet safety/toleration and education/implementation of compensatory strategies.  Continue current diet at this time and encouraged pt to increase PO intake.  Pt and wife educated on POC and safe swallowing strategies.  Pt's wife was able to repeat safe swallowing strategies and in agreement w/recommendations.

## 2021-04-12 NOTE — PROGRESS NOTES
[facility] Medicine Services  INPATIENT PROGRESS NOTE    Length of Stay: 0  Date of Admission: 4/10/2021  Primary Care Physician: Vielka Ramirez DO    Subjective   Chief Complaint: No complaints    HPI:      4/12/2021:  Patient is alert and answering questions appropriately this am.  He remains tachycardiac despite Coreg being increased last night.  Digoxin started by cardiology today.  Heart cath planned for tomorrow afternoon.     4/11/2021: The patient has been confused and agitated overnight and refusing lab draws.  Wife is at bedside.  Dr. Damon spoke with the patient's wife and explained that cardiac catheterization would be desirable evaluate his coronary status, but his current clinical status with mental confusion and agitation would prevent this procedure from being done.  Medical management would be appropriate at this time.    I spoke with the patient's daughter and wife and they state the patient has been demonstrating signs of dementia for a while.  They have bought him a helmet due to his increase in falls.  They state he gets confused and agitated often, but not to this degree.    H&P by Dr. Shannon: Patient is a 85 y.o. male with a past medical history of type 2 diabetes mellitus, essential hypertension, chronic pain, osteoarthritis and BPH who presented with complaints of shortness of breath and fatigue of 1 week duration and chest pain of 1 day duration.     Patient has been having shortness of breath and easy fatigability over the last 1 week.  He also noticed fatigue. Yesterday he developed chest pain while at rest which was retrosternal and mostly involved the left side of his chest. The chest pain was burning in nature and he felt it was heartburn. His chest pain subsequently resolved. This morning he developed another episode of squeezing retrosternal and left-sided chest pain with diaphoresis that persisted. EMS was called and he was brought to  the emergency room.  Patient received nitroglycerin by EMS and received aspirin in the emergency room with subsequent relief of his chest pain. He was slightly tachycardic on presentation with low normal blood pressure.  EKG showed ST segment depressions in leads II, aVF and V5 and V6.  T wave inversions in lead II and aVF. Troponin was elevated at 0.772. His proBNP was markedly elevated at 14,408. Patient also had leukocytosis. Chest x-ray showed interstitial opacities concerning for edema or pneumonia.    Review of Systems   Unable to perform ROS: Mental status change        Objective    Temp:  [97.3 °F (36.3 °C)-98.2 °F (36.8 °C)] 98 °F (36.7 °C)  Heart Rate:  [101-122] 101  Resp:  [18-29] 22  BP: ()/(63-83) 129/83    Physical Exam  Constitutional:       Appearance: He is well-developed.   HENT:      Head: Normocephalic and atraumatic.   Eyes:      Pupils: Pupils are equal, round, and reactive to light.   Cardiovascular:      Rate and Rhythm: Normal rate and regular rhythm.   Pulmonary:      Effort: Pulmonary effort is normal.      Breath sounds: Normal breath sounds.   Abdominal:      General: Bowel sounds are normal.      Palpations: Abdomen is soft.   Musculoskeletal:         General: Normal range of motion.      Cervical back: Normal range of motion and neck supple.   Skin:     General: Skin is warm and dry.   Neurological:      Mental Status: He is alert.       Results Review:  I have reviewed the labs, radiology results, and diagnostic studies.    Laboratory Data:   Results from last 7 days   Lab Units 04/12/21  0606 04/11/21  0554 04/10/21  1539   SODIUM mmol/L 133* 131* 132*   POTASSIUM mmol/L 3.8 3.9 3.7   CHLORIDE mmol/L 95* 96* 95*   CO2 mmol/L 25.0 27.0 26.0   BUN mg/dL 36* 24* 19   CREATININE mg/dL 1.29* 1.24 1.09   GLUCOSE mg/dL 187* 168* 198*   CALCIUM mg/dL 9.2 9.0 9.7   BILIRUBIN mg/dL  --   --  0.8   ALK PHOS U/L  --   --  74   ALT (SGPT) U/L  --   --  14   AST (SGOT) U/L  --   --  48*      ANION GAP mmol/L 13.0 8.0 11.0     Estimated Creatinine Clearance: 42.4 mL/min (A) (by C-G formula based on SCr of 1.29 mg/dL (H)).  Results from last 7 days   Lab Units 04/10/21  1539   MAGNESIUM mg/dL 1.2*         Results from last 7 days   Lab Units 04/12/21  0606 04/11/21  0555 04/10/21  1539   WBC 10*3/mm3 13.22* 11.01* 17.16*   HEMOGLOBIN g/dL 9.6* 9.3* 10.8*   HEMATOCRIT % 28.7* 25.7* 31.9*   PLATELETS 10*3/mm3 139* 118* 191     Results from last 7 days   Lab Units 04/10/21  1539   INR  0.98       Culture Data:   Blood Culture   Date Value Ref Range Status   04/10/2021 No growth at 24 hours  Preliminary     No results found for: URINECX  No results found for: RESPCX  No results found for: WOUNDCX  No results found for: STOOLCX  No components found for: BODYFLD    Radiology Data:   Imaging Results (Last 24 Hours)     ** No results found for the last 24 hours. **          I have reviewed the patient's current medications.     Assessment/Plan     Active Hospital Problems    Diagnosis  POA   • **NSTEMI (non-ST elevated myocardial infarction) (CMS/[facility]) [I21.4]  Yes   • Pneumonia due to infectious organism [J18.9]  Yes   • Hypertension [I10]  Yes   • Type 2 diabetes mellitus without complication, without long-term current use of insulin (CMS/[facility]) [E11.9]  Yes       Plan:    1.  NSTEMI: Cardiology consult appreciated.  Due to the patient's confusion and agitation, coronary angiogram could not be performed on admission.   Continue antiplatelet therapy with aspirin, carvedilol 12.5 mg twice a day and IV Lasix.  Echocardiogram shows EF of 25%.  Digoxin started by cardiology today.  Heart cath planned tomorrow afternoon.   2.  Pneumonia, bilateral: Continue IV Rocephin and doxycycline.  Continue DuoNebs.  Incentive spirometry.  3.  Delirium, improved: Likely in the setting of dementia.  Safety precautions.  Continue restraints for safety.  Recommend family to stay with him as much as possible. Speech therapy consult  appreciated.   4.  Diabetes mellitus, type II: Continue SSI.  5.  BPH: Continue home Proscar and Flomax.  6.  Acute respiratory failure with hypoxia:  Patient currently on 3-4 liters. Will continue to wean as appropriate.       Discharge Planning: I expect patient to be discharged to home in 2-3 days.      This document has been electronically signed by MART Reyes on April 12, 2021 11:27 CDT

## 2021-04-13 NOTE — CONSULTS
CVTS CONSULTATION          Patient Care Team:  Vielka Ramirez DO as PCP - General  Requesting Provider: Donnell    Chief complaint: CAD      SUBJECTIVE       History of Present Illness:  85 y.o. male with HTN(stable, increased risk stroke, rupture), Hyperlipidemia(stable, increased risk cardiovascular events) and Diabetes Mellitus(stable, increased risk cardiovascular events)  DJD. Limited mobility. Chronic pain. Dementia.  former smoker.  No TIA, stroke, or amaurosis.  No MI or claudication. No other associated signs, symptoms or modifying factors. Presented to ER with chest pain, diaphoresis improved with nitroglycerin. EKG with RBBB, elevated troponin. Cardiac cath performed demonstrated severe CAD with reduction EF 25% severe hypokinesis/dyskinesis. CT surgery consulted for surgical opinion.    The following portions of the patient's history were reviewed and updated as appropriate: allergies, current medications, past family history, past medical history, past social history, past surgical history and problem list.  Recent images independently reviewed.  Available laboratory values reviewed.    Review of Systems   Unable to perform ROS: Dementia        Past Medical History:   Diagnosis Date   • Arthritis    • Broken ribs 01/05/2018   • Diabetes mellitus (CMS/HCC)    • Hypertension    • Inguinal hernia      Past Surgical History:   Procedure Laterality Date   • APPENDECTOMY  1980's   • CYSTOSCOPY N/A 4/25/2018    Procedure: CYSTOSCOPY;  Surgeon: Michael Forbes MD;  Location: [facility] OR;  Service: Urology   • CYSTOSCOPY TRANSURETHRAL RESECTION OF PROSTATE N/A 5/9/2018    Procedure: CYSTOSCOPY TRANSURETHRAL RESECTION OF PROSTATE; PLACEMENT SUPRAPUBIC CATHETER;  Surgeon: Michael Forbes MD;  Location: [facility] OR;  Service: Urology   • INGUINAL HERNIA REPAIR Left 4/19/2018    Procedure: OPEN REPAIR OF A LARGE INGUINAL HERNIA;  Surgeon: Ramin Collado MD;  Location: [facility] OR;  Service: General     Family History      Problem Relation Age of Onset   • Heart disease Mother         MI   • No Known Problems Father      Social History     Tobacco Use   • Smoking status: Former Smoker     Packs/day: 1.00     Types: Cigarettes     Quit date:      Years since quittin.2   • Smokeless tobacco: Never Used   • Tobacco comment: smoke for 50 years   Substance Use Topics   • Alcohol use: Yes     Comment: occasionally   • Drug use: No     Medications Prior to Admission   Medication Sig Dispense Refill Last Dose   • carvedilol (COREG) 12.5 MG tablet Take 12.5 mg by mouth 2 (Two) Times a Day With Meals.   4/10/2021 at 0900   • finasteride (PROSCAR) 5 MG tablet Take 5 mg by mouth Daily.   4/10/2021 at 0900   • HYDROcodone-acetaminophen (NORCO)  MG per tablet TAKE 1 TABLET BY MOUTH EVERY SIX HOURS AS NEEDED FOR PAIN   4/10/2021 at 0900   • lisinopril (PRINIVIL,ZESTRIL) 10 MG tablet Take 10 mg by mouth Daily.   4/10/2021 at 0900   • metFORMIN (GLUCOPHAGE) 500 MG tablet Take 500 mg by mouth 2 (Two) Times a Day With Meals.   4/10/2021 at 0900   • NIFEdipine XL (PROCARDIA XL) 30 MG 24 hr tablet Take 30 mg by mouth Daily.   4/10/2021 at Unknown time   • tamsulosin (FLOMAX) 0.4 MG capsule 24 hr capsule Take 1 capsule by mouth Every Night.   2021 at Unknown time     aspirin, 81 mg, Oral, Daily  carvedilol, 12.5 mg, Oral, BID With Meals  cefTRIAXone, 1 g, Intravenous, Q24H  digoxin, 125 mcg, Oral, Daily  doxycycline, 100 mg, Intravenous, Q12H  finasteride, 5 mg, Oral, Daily  furosemide, 20 mg, Intravenous, Q12H  insulin aspart, 0-7 Units, Subcutaneous, TID AC  rosuvastatin, 10 mg, Oral, Nightly  sodium chloride, 10 mL, Intravenous, Q12H  tamsulosin, 0.4 mg, Oral, Nightly      Allergies:  Zofran [ondansetron hcl]    OBJECTIVE        Vital Signs  Temp:  [96.6 °F (35.9 °C)-97.3 °F (36.3 °C)] 96.8 °F (36 °C)  Heart Rate:  [] 98  Resp:  [18-22] 18  BP: (107-137)/(62-82) 126/75  Arterial Line BP: (138)/(76) 138/76    Flowsheet Rows      " First Filed Value   Admission Height  170.2 cm (67\") Documented at 04/10/2021 1525   Admission Weight  68 kg (150 lb) Documented at 04/10/2021 1525        170.2 cm (67.01\")    Physical Exam  Vitals reviewed.   Constitutional:       Appearance: He is ill-appearing.   HENT:      Head: Normocephalic.      Mouth/Throat:      Mouth: Mucous membranes are moist.   Eyes:      Pupils: Pupils are equal, round, and reactive to light.   Neck:      Vascular: No carotid bruit.   Cardiovascular:      Rate and Rhythm: Regular rhythm.      Pulses: Normal pulses.      Heart sounds: Normal heart sounds.   Pulmonary:      Effort: Pulmonary effort is normal.      Breath sounds: Normal breath sounds.   Abdominal:      General: Bowel sounds are normal.      Palpations: Abdomen is soft.   Musculoskeletal:      Cervical back: Normal range of motion.      Right lower leg: No edema.      Left lower leg: No edema.   Skin:     General: Skin is warm and dry.      Capillary Refill: Capillary refill takes less than 2 seconds.      Coloration: Skin is pale.   Neurological:      General: No focal deficit present.      Mental Status: Mental status is at baseline.      Gait: Gait abnormal (limited mobility).         Results Review:   Lab Results   Component Value Date    WBC 11.71 (H) 04/13/2021    HGB 9.4 (L) 04/13/2021    HCT 28.0 (L) 04/13/2021    MCV 85.9 04/13/2021     04/13/2021     Lab Results   Component Value Date    GLUCOSE 176 (H) 04/13/2021    BUN 45 (H) 04/13/2021    CREATININE 1.30 (H) 04/13/2021    EGFRIFNONA 53 (L) 04/13/2021    BCR 34.6 (H) 04/13/2021    K 4.6 04/13/2021    CO2 21.0 (L) 04/13/2021    CALCIUM 9.6 04/13/2021    ALBUMIN 4.20 04/10/2021    AST 48 (H) 04/10/2021    ALT 14 04/10/2021     Results for orders placed during the hospital encounter of 04/10/21    Adult Transthoracic Echo Complete W/ Cont if Necessary Per Protocol    Interpretation Summary  · Estimated left ventricular EF = 25% Left ventricular ejection " fraction appears to be 21 - 25%. Left ventricular systolic function is moderately decreased. The left ventricular cavity is mildly dilated. Mid/ Distal Septum, [place], Anterior wall severely hypokinetic/ dyskinetic Left ventricular diastolic function is consistent with (grade Ia w/high LAP) impaired relaxation.  · Mild to moderate mitral valve regurgitation is present.  · Estimated right ventricular systolic pressure from tricuspid regurgitation is markedly elevated (>55 mmHg).           ASSESSMENT/PLAN         NSTEMI (non-ST elevated myocardial infarction) (CMS/[facility])    Type 2 diabetes mellitus without complication, without long-term current use of insulin (CMS/[facility])    Hypertension    Pneumonia due to infectious organism      Assessment & Plan    Detailed discussion with Eze Downing wife and daughter regarding situation options and plans.  Severe symptomatic CAD with collateral flow, severe reduction EF 25%. Poor targets for bypass. Severe physical deconditioning. CABG would be high risk for morbidity/mortality. Recommend medical management and follow with Cardiology.    Thank you for the opportunity to participate in this patient's care.       I discussed the patients findings and my recommendations with Dr. Hope and family.              This document has been electronically signed by MART Garner on April 13, 2021 16:14 CDT

## 2021-04-13 NOTE — PROGRESS NOTES
[facility] Medicine Services  INPATIENT PROGRESS NOTE    Length of Stay: 1  Date of Admission: 4/10/2021  Primary Care Physician: Vielka Ramirez DO    Subjective   Chief Complaint: No complaints    HPI:      4/13/2021:  He patient is alert and cooperative today.  He is scheduled for heart cath this afternoon.     4/12/2021:  Patient is alert and answering questions appropriately this am.  He remains tachycardiac despite Coreg being increased last night.  Digoxin started by cardiology today.  Heart cath planned for tomorrow afternoon.     4/11/2021: The patient has been confused and agitated overnight and refusing lab draws.  Wife is at bedside.  Dr. Damon spoke with the patient's wife and explained that cardiac catheterization would be desirable evaluate his coronary status, but his current clinical status with mental confusion and agitation would prevent this procedure from being done.  Medical management would be appropriate at this time.    I spoke with the patient's daughter and wife and they state the patient has been demonstrating signs of dementia for a while.  They have bought him a helmet due to his increase in falls.  They state he gets confused and agitated often, but not to this degree.    H&P by Dr. Shannon: Patient is a 85 y.o. male with a past medical history of type 2 diabetes mellitus, essential hypertension, chronic pain, osteoarthritis and BPH who presented with complaints of shortness of breath and fatigue of 1 week duration and chest pain of 1 day duration.     Patient has been having shortness of breath and easy fatigability over the last 1 week.  He also noticed fatigue. Yesterday he developed chest pain while at rest which was retrosternal and mostly involved the left side of his chest. The chest pain was burning in nature and he felt it was heartburn. His chest pain subsequently resolved. This morning he developed another episode of squeezing  retrosternal and left-sided chest pain with diaphoresis that persisted. EMS was called and he was brought to the emergency room.  Patient received nitroglycerin by EMS and received aspirin in the emergency room with subsequent relief of his chest pain. He was slightly tachycardic on presentation with low normal blood pressure.  EKG showed ST segment depressions in leads II, aVF and V5 and V6.  T wave inversions in lead II and aVF. Troponin was elevated at 0.772. His proBNP was markedly elevated at 14,408. Patient also had leukocytosis. Chest x-ray showed interstitial opacities concerning for edema or pneumonia.    Review of Systems   Unable to perform ROS: Mental status change        Objective    Temp:  [96.6 °F (35.9 °C)-97.8 °F (36.6 °C)] 96.8 °F (36 °C)  Heart Rate:  [] 81  Resp:  [18-22] 20  BP: (107-137)/(62-82) 134/74    Physical Exam  Constitutional:       Appearance: He is well-developed.   HENT:      Head: Normocephalic and atraumatic.   Eyes:      Pupils: Pupils are equal, round, and reactive to light.   Cardiovascular:      Rate and Rhythm: Normal rate and regular rhythm.   Pulmonary:      Effort: Pulmonary effort is normal.      Breath sounds: Normal breath sounds.   Abdominal:      General: Bowel sounds are normal.      Palpations: Abdomen is soft.   Musculoskeletal:         General: Normal range of motion.      Cervical back: Normal range of motion and neck supple.   Skin:     General: Skin is warm and dry.   Neurological:      Mental Status: He is alert.       Results Review:  I have reviewed the labs, radiology results, and diagnostic studies.    Laboratory Data:   Results from last 7 days   Lab Units 04/13/21  0523 04/12/21  0606 04/11/21  0554 04/10/21  1539   SODIUM mmol/L 133* 133* 131* 132*   POTASSIUM mmol/L 4.6 3.8 3.9 3.7   CHLORIDE mmol/L 97* 95* 96* 95*   CO2 mmol/L 21.0* 25.0 27.0 26.0   BUN mg/dL 45* 36* 24* 19   CREATININE mg/dL 1.30* 1.29* 1.24 1.09   GLUCOSE mg/dL 176* 187* 168*  198*   CALCIUM mg/dL 9.6 9.2 9.0 9.7   BILIRUBIN mg/dL  --   --   --  0.8   ALK PHOS U/L  --   --   --  74   ALT (SGPT) U/L  --   --   --  14   AST (SGOT) U/L  --   --   --  48*   ANION GAP mmol/L 15.0 13.0 8.0 11.0     Estimated Creatinine Clearance: 41.6 mL/min (A) (by C-G formula based on SCr of 1.3 mg/dL (H)).  Results from last 7 days   Lab Units 04/10/21  1539   MAGNESIUM mg/dL 1.2*         Results from last 7 days   Lab Units 04/13/21  0655 04/12/21  0606 04/11/21  0555 04/10/21  1539   WBC 10*3/mm3 11.71* 13.22* 11.01* 17.16*   HEMOGLOBIN g/dL 9.4* 9.6* 9.3* 10.8*   HEMATOCRIT % 28.0* 28.7* 25.7* 31.9*   PLATELETS 10*3/mm3 144 139* 118* 191     Results from last 7 days   Lab Units 04/10/21  1539   INR  0.98       Culture Data:   Blood Culture   Date Value Ref Range Status   04/10/2021 No growth at 2 days  Preliminary     No results found for: URINECX  No results found for: RESPCX  No results found for: WOUNDCX  No results found for: STOOLCX  No components found for: BODYFLD    Radiology Data:   Imaging Results (Last 24 Hours)     ** No results found for the last 24 hours. **          I have reviewed the patient's current medications.     Assessment/Plan     Active Hospital Problems    Diagnosis  POA   • **NSTEMI (non-ST elevated myocardial infarction) (CMS/Former[facility]) [I21.4]  Yes   • Pneumonia due to infectious organism [J18.9]  Yes   • Hypertension [I10]  Yes   • Type 2 diabetes mellitus without complication, without long-term current use of insulin (CMS/[facility]) [E11.9]  Yes       Plan:    1.  NSTEMI: Cardiology consult appreciated.  Due to the patient's confusion and agitation, coronary angiogram could not be performed on admission.  Heart cath scheduled this afternoon. Continue antiplatelet therapy with aspirin, carvedilol 12.5 mg twice a day and IV Lasix.  Echocardiogram shows EF of 25%.  Continue Digoxin.   2.  Pneumonia, bilateral: Continue IV Rocephin and doxycycline.  Continue DuoNebs.  Incentive  spirometry.  3.  Delirium, improved: Likely in the setting of dementia.  Safety precautions.  Continue restraints for safety.  Recommend family to stay with him as much as possible. Speech therapy consult appreciated.   4.  Diabetes mellitus, type II: Continue SSI.  5.  BPH: Continue home Proscar and Flomax.  6.  Acute respiratory failure with hypoxia:  Patient currently on 3-4 liters. Will continue to wean as appropriate.       Discharge Planning: I expect patient to be discharged to home in 2-3 days.      This document has been electronically signed by MART Reyes on April 13, 2021 13:56 CDT

## 2021-04-13 NOTE — PLAN OF CARE
Goal Outcome Evaluation:  Plan of Care Reviewed With: patient  Progress: no change  Outcome Summary: VSS, pt remains on 4L NC, c/o CP and indegestion/nausea often. PRN meds given. pt is to have heart cath today at lunchtime with Dr Jacobo, npo after light breakfast. pt family at bedside. pt still confused but more seems to be more oriented than previously.

## 2021-04-13 NOTE — PLAN OF CARE
Goal Outcome Evaluation:  Plan of Care Reviewed With: patient, daughter  Progress: no change  Outcome Summary: Pt seen for skilled ST services this date to address oropharyngeal dysphagia.  Pt seen w/AM meal and was able to self feed.  Pt continues to present w/poor PO intake even w/encouragement from staff and family.  Pt was educated on increased PO intake, food choices to improve intake/nutrition, and supplements.  Pt and dtr verbalized understanding.  Pt consumed mech soft solids (scrambled eggs, oatmeal) w/efficient mastication, good oral clearance, and timely AP transit.  Pt did present w/more controlled breathing during PO intake, but still slightly increased respiratory rate.  Pt consumed thin liquids via cup rim w/no overt s/s of aspiration.  SLP discussed d/c instructions and recommendations w/pt and dtr who were in agreement.

## 2021-04-13 NOTE — THERAPY DISCHARGE NOTE
Acute Care - Speech Language Pathology   Swallow Treatment Note/Discharge [facility]     Patient Name: Eze Downing  : 1935  MRN: [id number]  Today's Date: 2021               Admit Date: 4/10/2021    Goal:  Patient will safely tolerate least restricted diet w/no overt s/s of aspiration for adequate nutrition and hydration:  Pt seen for skilled ST services this date to address oropharyngeal dysphagia. Pt seen w/AM meal and was able to self feed. Pt continues to present w/poor PO intake even w/encouragement from staff and family. Pt was educated on increased PO intake, food choices to improve intake/nutrition, and supplements. Pt and dtr verbalized understanding. Pt consumed mech soft solids (scrambled eggs, oatmeal) w/efficient mastication, good oral clearance, and timely AP transit. Pt did present w/more controlled breathing during PO intake, but still slightly increased respiratory rate. Pt consumed thin liquids via cup rim w/no overt s/s of aspiration. SLP discussed d/c instructions and recommendations w/pt and dtr who were in agreement.  Goal Met    Visit Dx:    ICD-10-CM ICD-9-CM   1. Pneumonia due to infectious organism, unspecified laterality, unspecified part of lung  J18.9 486   2. NSTEMI (non-ST elevated myocardial infarction) (CMS/HCC)  I21.4 410.70   3. Oral phase dysphagia  R13.11 787.21     Patient Active Problem List   Diagnosis   • Non-recurrent unilateral inguinal hernia without obstruction or gangrene   • Retention of urine   • Enlarged prostate with urinary obstruction   • BPH (benign prostatic hyperplasia)   • Type 2 diabetes mellitus without complication, without long-term current use of insulin (CMS/HCC)   • Multiple joint pain   • Hypertension   • Gastroesophageal reflux disease   • Chronic obstructive pulmonary disease (CMS/HCC)   • Chronic low back pain without sciatica   • At high risk for falls   • Pneumonia due to infectious organism   • NSTEMI (non-ST elevated  myocardial infarction) (CMS/HCC)     Past Medical History:   Diagnosis Date   • Arthritis    • Broken ribs 01/05/2018   • Diabetes mellitus (CMS/HCC)    • Hypertension    • Inguinal hernia      Past Surgical History:   Procedure Laterality Date   • APPENDECTOMY  1980's   • CYSTOSCOPY N/A 4/25/2018    Procedure: CYSTOSCOPY;  Surgeon: Michael Forbes MD;  Location: [facility];  Service: Urology   • CYSTOSCOPY TRANSURETHRAL RESECTION OF PROSTATE N/A 5/9/2018    Procedure: CYSTOSCOPY TRANSURETHRAL RESECTION OF PROSTATE; PLACEMENT SUPRAPUBIC CATHETER;  Surgeon: Michael Forbes MD;  Location: [facility];  Service: Urology   • INGUINAL HERNIA REPAIR Left 4/19/2018    Procedure: OPEN REPAIR OF A LARGE INGUINAL HERNIA;  Surgeon: Ramin Collado MD;  Location: [facility];  Service: General          SWALLOW EVALUATION (last 72 hours)      SLP Adult Swallow Evaluation     Row Name 04/13/21 0754 04/12/21 0716 04/11/21 1414             Rehab Evaluation    Document Type  discharge treatment  -CK  therapy note (daily note)  -CK  evaluation  -EC      Total Evaluation Minutes, SLP  29  -CK  40  -CK  --      Subjective Information  no complaints  -CK  --  no complaints  -EC      Patient Observations  alert;cooperative;agree to therapy  -CK  --  alert;cooperative;agree to therapy  -EC      Patient/Family/Caregiver Comments/Observations  Daughter present at bedside  -CK  --  daughter and wife present  -EC      Care Plan Review  evaluation/treatment results reviewed;care plan/treatment goals reviewed;risks/benefits reviewed;current/potential barriers reviewed;patient/other agree to care plan  -CK  --  evaluation/treatment results reviewed;care plan/treatment goals reviewed;risks/benefits reviewed;current/potential barriers reviewed;patient/other agree to care plan  -EC      Care Plan Review, Other Participant(s)  daughter  -CK  --  daughter;spouse  -EC      Patient Effort  good  -CK  good  -CK  good  -EC      Comment  --  --  pt o2 with 5L  nc ranged from 84-88%  nsg informed  -EC      Symptoms Noted During/After Treatment  none  -CK  none  -CK  none  -EC         General Information    Patient Profile Reviewed  yes  -CK  yes  -CK  yes  -EC      Pertinent History Of Current Problem  --  --  admitted with confusion which has improved since familly is present  -EC      Current Method of Nutrition  soft textures;ground;thin liquids  -CK  soft textures;ground;thin liquids  -CK  NPO  -EC         Pain    Additional Documentation  --  --  Pain Scale: Numbers Pre/Post-Treatment (Group)  -EC         Pain Scale: Numbers Pre/Post-Treatment    Pretreatment Pain Rating  0/10 - no pain  -CK  --  7/10  -EC      Posttreatment Pain Rating  0/10 - no pain  -CK  --  7/10  -EC      Pain Location  --  --  back  -EC      Pre/Posttreatment Pain Comment  --  --  nsg aware  -EC         Oral Motor Structure and Function    Dentition Assessment  edentulous  -CK  edentulous  -CK  edentulous  -EC      Secretion Management  WNL/WFL  -CK  WNL/WFL  -CK  WNL/WFL  -EC      Mucosal Quality  moist, healthy  -CK  moist, healthy  -CK  moist, healthy  -EC      Gag Response  WFL  -CK  WFL  -CK  WFL  -EC      Volitional Swallow  WFL  -CK  WFL  -CK  WFL  -EC      Volitional Cough  WFL  -CK  WFL  -CK  WFL  -EC         General Eating/Swallowing Observations    Respiratory Support Currently in Use  nasal cannula  -CK  nasal cannula  -CK  nasal cannula 4L.  upped to 5L during eval  -EC      Eating/Swallowing Skills  self-fed  -CK  self-fed  -CK  self-fed;fed by SLP  -EC      Positioning During Eating  upright 90 degree;upright in bed  -CK  upright 90 degree;upright in bed  -CK  upright 90 degree;upright in bed  -EC      Utensils Used  cup;straw  -CK  cup;straw  -CK  cup;straw  -EC      Consistencies Trialed  thin liquids;soft textures  -CK  thin liquids;soft textures  -CK  thin liquids;soft textures  -EC      Pre SpO2 (%)  --  --  84  -EC      Post SpO2 (%)  --  --  88  -EC         Respiratory     Respiratory Status  increase in respiratory rate;during swallowing/eating  -CK  increase in respiratory rate;during swallowing/eating  -CK  --         Clinical Swallow Eval    Oral Prep Phase  WFL  -CK  impaired  -CK  --      Oral Transit  WFL  -CK  impaired  -CK  --      Oral Residue  WFL  -CK  impaired  -CK  --      Pharyngeal Phase  no overt signs/symptoms of pharyngeal impairment  -CK  suspected pharyngeal impairment  -CK  --      Esophageal Phase  unremarkable  -CK  unremarkable  -CK  --      Clinical Swallow Evaluation Summary  --  --  pt with increased work of breathing noted during eval.  pt with increased risk of aspiration d/t SOA.  no overt s/s of aspiration noted  -EC         Clinical Impression    Daily Summary of Progress (SLP)  progress toward functional goals as expected  -CK  progress toward functional goals as expected  -CK  progress toward functional goals as expected  -EC      Barriers to Overall Progress (SLP)  SOA  -CK  SOA  -CK  short of air  -EC      SLP Swallowing Diagnosis  functional oral phase;functional pharyngeal phase  -CK  functional oral phase;functional pharyngeal phase  -CK  functional oral phase;functional pharyngeal phase  -EC      Rehab Potential/Prognosis, Swallowing  good, to achieve stated therapy goals  -CK  good, to achieve stated therapy goals  -CK  good, to achieve stated therapy goals  -EC      Swallow Criteria for Skilled Therapeutic Interventions Met  demonstrates skilled criteria  -CK  demonstrates skilled criteria  -CK  demonstrates skilled criteria  -EC      Plan for Continued Treatment (SLP)  d/c on current diet  -CK  f/u for additional session for diet safety d/t limited PO intake  -CK  initiate diet.  f/u for tolerance and use of compensatory strategies  -EC         Recommendations    Therapy Frequency (Swallow)  1 day per week;2 days per week  -CK  1 day per week;2 days per week  -CK  1 day per week;2 days per week  -EC      Predicted Duration Therapy  Intervention (Days)  until discharge  -CK  until discharge  -CK  until discharge  -EC      SLP Diet Recommendation  soft textures;ground;thin liquids  -CK  soft textures;ground;thin liquids  -CK  soft textures;ground;thin liquids  -EC      SLP Rec. for Method of Medication Administration  meds whole;with thin liquids  -CK  meds whole;with thin liquids  -CK  meds whole;with thin liquids  -EC      Monitor for Signs of Aspiration  yes;notify SLP if any concerns  -CK  yes;notify SLP if any concerns  -CK  yes;notify SLP if any concerns  -EC      Anticipated Discharge Disposition (SLP)  unknown  -CK  unknown  -CK  unknown  -EC         Swallow Goals (SLP)    Oral Nutrition/Hydration Goal Selection (SLP)  oral nutrition/hydration, SLP goal 1  -CK  oral nutrition/hydration, SLP goal 1  -CK  oral nutrition/hydration, SLP goal 1  -EC         Oral Nutrition/Hydration Goal 1 (SLP)    Oral Nutrition/Hydration Goal 1, SLP  pt to use compensatory strategies during PO to decrease risk of aspiration  -CK  pt to use compensatory strategies during PO to decrease risk of aspiration  -CK  pt to use compensatory strategies during PO to decrease risk of aspiration  -EC      Time Frame (Oral Nutrition/Hydration Goal 1, SLP)  by discharge  -CK  by discharge  -CK  by discharge  -EC      Barriers (Oral Nutrition/Hydration Goal 1, SLP)  short of air  -CK  short of air  -CK  short of air  -EC      Progress/Outcomes (Oral Nutrition/Hydration Goal 1, SLP)  (S) goal met  -CK  goal partially met  -CK  other (see comments) new goal  -EC        User Key  (r) = Recorded By, (t) = Taken By, (c) = Cosigned By    Initials Name Effective Dates    Kelsi Chaudhry CCC-SLP 02/11/20 -     CK Carlee De La Cruz MS CCC-SLP 02/11/20 -           EDUCATION  The patient has been educated in the following areas:   Dysphagia (Swallowing Impairment) Modified Diet Instruction.    SLP Recommendation and Plan  SLP Swallowing Diagnosis: functional oral phase,  functional pharyngeal phase  SLP Diet Recommendation: soft textures, ground, thin liquids     Monitor for Signs of Aspiration: yes, notify SLP if any concerns     Swallow Criteria for Skilled Therapeutic Interventions Met: demonstrates skilled criteria  Anticipated Discharge Disposition (SLP): unknown  Rehab Potential/Prognosis, Swallowing: good, to achieve stated therapy goals  Therapy Frequency (Swallow): 1 day per week, 2 days per week  Predicted Duration Therapy Intervention (Days): until discharge     Daily Summary of Progress (SLP): progress toward functional goals as expected  Plan for Continued Treatment (SLP): d/c on current diet  Anticipated Discharge Disposition (SLP): unknown        Reason for Discharge: all goals and outcomes met, no further needs identified    Plan of Care Reviewed With: patient, daughter  Progress: no change  Outcome Summary: Pt seen for skilled ST services this date to address oropharyngeal dysphagia.  Pt seen w/AM meal and was able to self feed.  Pt continues to present w/poor PO intake even w/encouragement from staff and family.  Pt was educated on increased PO intake, food choices to improve intake/nutrition, and supplements.  Pt and dtr verbalized understanding.  Pt consumed mech soft solids (scrambled eggs, oatmeal) w/efficient mastication, good oral clearance, and timely AP transit.  Pt did present w/more controlled breathing during PO intake, but still slightly increased respiratory rate.  Pt consumed thin liquids via cup rim w/no overt s/s of aspiration.  SLP discussed d/c instructions and recommendations w/pt and dtr who were in agreement.    SLP GOALS     Row Name 04/13/21 0754 04/12/21 0716 04/11/21 1414       Oral Nutrition/Hydration Goal 1 (SLP)    Oral Nutrition/Hydration Goal 1, SLP  pt to use compensatory strategies during PO to decrease risk of aspiration  -CK  pt to use compensatory strategies during PO to decrease risk of aspiration  -CK  pt to use compensatory  strategies during PO to decrease risk of aspiration  -EC    Time Frame (Oral Nutrition/Hydration Goal 1, SLP)  by discharge  -CK  by discharge  -CK  by discharge  -EC    Barriers (Oral Nutrition/Hydration Goal 1, SLP)  short of air  -CK  short of air  -CK  short of air  -EC    Progress/Outcomes (Oral Nutrition/Hydration Goal 1, SLP)  (S) goal met  -CK  goal partially met  -CK  other (see comments) new goal  -EC      User Key  (r) = Recorded By, (t) = Taken By, (c) = Cosigned By    Initials Name Provider Type    Kelsi Chaudhry, CCC-SLP Speech and Language Pathologist    Carlee Bray MS CCC-SLP Speech and Language Pathologist               Time Calculation:   Time Calculation- SLP     Row Name 04/13/21 0823             Time Calculation- SLP    SLP Start Time  0754  -CK      SLP Stop Time  0823  -CK      SLP Time Calculation (min)  29 min  -CK      Total Timed Code Minutes- SLP  29 minute(s)  -CK      SLP Received On  04/13/21  -CK        User Key  (r) = Recorded By, (t) = Taken By, (c) = Cosigned By    Initials Name Provider Type    Carlee Bray, MS CCC-SLP Speech and Language Pathologist          Therapy Charges for Today     Code Description Service Date Service Provider Modifiers Qty    [id number] HC ST TREATMENT SWALLOW 3 4/12/2021 Carlee De La Cruz MS CCC-SLP GN 1    [id number] HC ST TREATMENT SWALLOW 2 4/13/2021 Carlee De La Cruz MS CCC-SLP GN 1               SLP Discharge Summary  Anticipated Discharge Disposition (SLP): unknown  Reason for Discharge: all goals and outcomes met, no further needs identified  Progress Toward Achieving Short/long Term Goals: all goals met within established timelines    Carlee De La Cruz MS CCC-SLP  4/13/2021

## 2021-04-13 NOTE — PROGRESS NOTES
"[facility] Progress Note   LOS: 1 day   Patient Care Team:  Vielka Ramirez DO as PCP - General    Chief Complaint:    Chief Complaint   Patient presents with    Shortness of Breath    Chest Pain    Heartburn        Subjective     Interval History:   Patient Denies: chest pain  Patient Complaints: Intermittent SOA and wheezing  History taken from: patient chart family     Patient alert, calm and appears to be comprehending everything today.  Wife is at bedside.  Tachycardia has improved some with digoxin and increase of coreg.  Patient is scheduled for C this evening. All questions and concerns were answered today. He tolerated laying flat today.     Objective     Vital Sign Min/Max for last 24 hours  Temp  Min: 96.6 °F (35.9 °C)  Max: 97.8 °F (36.6 °C)   BP  Min: 107/62  Max: 137/82   Pulse  Min: 90  Max: 104   Resp  Min: 18  Max: 22   SpO2  Min: 95 %  Max: 96 %   Flow (L/min)  Min: 4  Max: 4   Weight  Min: 68.3 kg (150 lb 8 oz)  Max: 68.3 kg (150 lb 8 oz)     Flowsheet Rows        First Filed Value   Admission Height  170.2 cm (67\") Documented at 04/10/2021 1525   Admission Weight  68 kg (150 lb) Documented at 04/10/2021 1525              04/11/21  0601 04/11/21  1606 04/13/21  0600   Weight: 69.1 kg (152 lb 6.4 oz) 69.1 kg (152 lb 5.4 oz) 68.3 kg (150 lb 8 oz)       Physical Exam:  Physical Exam  Vitals and nursing note reviewed.   Constitutional:       General: He is not in acute distress.     Appearance: Normal appearance. He is well-developed. He is not diaphoretic.      Interventions: Nasal cannula in place.   HENT:      Head: Normocephalic and atraumatic.      Right Ear: External ear normal.      Left Ear: External ear normal.   Eyes:      General: Lids are normal.      Conjunctiva/sclera: Conjunctivae normal.      Pupils: Pupils are equal, round, and reactive to light.   Neck:      Vascular: No carotid bruit.   Cardiovascular:      Rate and Rhythm: Normal rate and regular rhythm.      Chest Wall: PMI " is not displaced.      Heart sounds: S1 normal and S2 normal. No murmur heard.   No friction rub. No gallop. No S3 sounds.    Pulmonary:      Effort: Pulmonary effort is normal. No respiratory distress.      Breath sounds: No stridor. Wheezing and rales present.   Chest:      Chest wall: No tenderness.   Abdominal:      General: Bowel sounds are normal. There is no distension.      Palpations: Abdomen is soft.      Tenderness: There is no abdominal tenderness.   Musculoskeletal:      Cervical back: Normal range of motion and neck supple.      Right lower leg: No edema.      Left lower leg: No edema.   Skin:     General: Skin is warm and dry.      Capillary Refill: Capillary refill takes 2 to 3 seconds.      Findings: No erythema or rash.   Neurological:      Mental Status: He is alert. Mental status is at baseline.      Cranial Nerves: No cranial nerve deficit.      Comments: Intermittent confusion   Psychiatric:      Comments: Intermittent confusion          Results Review:     Results from last 7 days   Lab Units 04/13/21  0523 04/12/21  0606 04/11/21  0554 04/10/21  1539   SODIUM mmol/L 133* 133* 131* 132*   POTASSIUM mmol/L 4.6 3.8 3.9 3.7   CHLORIDE mmol/L 97* 95* 96* 95*   CO2 mmol/L 21.0* 25.0 27.0 26.0   BUN mg/dL 45* 36* 24* 19   CREATININE mg/dL 1.30* 1.29* 1.24 1.09   CALCIUM mg/dL 9.6 9.2 9.0 9.7   BILIRUBIN mg/dL  --   --   --  0.8   ALK PHOS U/L  --   --   --  74   ALT (SGPT) U/L  --   --   --  14   AST (SGOT) U/L  --   --   --  48*   GLUCOSE mg/dL 176* 187* 168* 198*       Estimated Creatinine Clearance: 41.6 mL/min (A) (by C-G formula based on SCr of 1.3 mg/dL (H)).    Results from last 7 days   Lab Units 04/10/21  1539   MAGNESIUM mg/dL 1.2*             Results from last 7 days   Lab Units 04/13/21  0655 04/12/21  0606 04/11/21  0555 04/10/21  1539   WBC 10*3/mm3 11.71* 13.22* 11.01* 17.16*   HEMOGLOBIN g/dL 9.4* 9.6* 9.3* 10.8*   PLATELETS 10*3/mm3 144 139* 118* 191       Results from last 7 days      Lab Units 04/10/21  1539   INR  0.98     Lab Results   Component Value Date    PROBNP 14,408.0 (H) 04/10/2021       I/O last 3 completed shifts:  In: 640 [P.O.:640]  Out: 400 [Urine:400]    Cardiographics:  ECG/EMG Results (last 24 hours)       Procedure Component Value Units Date/Time    Adult Transthoracic Echo Complete W/ Cont if Necessary Per Protocol [[id number]] Collected: 04/11/21 1447     Updated: 04/11/21 1808     BSA 1.8 m^2      RVIDd 2.7 cm      IVSd 1.1 cm      LVIDd 5.5 cm      LVIDs 4.9 cm      LVPWd 0.88 cm      IVS/LVPW 1.2     FS 11.1 %      EDV(Teich) 147.4 ml      ESV(Teich) 112.3 ml      EF(Teich) 23.8 %      EDV(cubed) 166.4 ml      ESV(cubed) 116.9 ml      EF(cubed) 29.7 %      LV mass(C)d 208.3 grams      LV mass(C)dI 115.7 grams/m^2      SV(Teich) 35.1 ml      SI(Teich) 19.5 ml/m^2      SV(cubed) 49.4 ml      SI(cubed) 27.5 ml/m^2      Ao root diam 3.5 cm      Ao root area 9.6 cm^2      ACS 1.8 cm      LA dimension 4.0 cm      asc Aorta Diam 3.2 cm      LA/Ao 1.1     LVOT diam 1.9 cm      LVOT area 2.8 cm^2      LVOT area(traced) 2.8 cm^2      LVLd ap4 8.9 cm      EDV(MOD-sp4) 136.0 ml      LVLs ap4 9.0 cm      ESV(MOD-sp4) 95.9 ml      EF(MOD-sp4) 29.5 %      LVLd ap2 9.3 cm      EDV(MOD-sp2) 145.0 ml      LVLs ap2 9.0 cm      ESV(MOD-sp2) 130.0 ml      EF(MOD-sp2) 10.3 %      SV(MOD-sp4) 40.1 ml      SI(MOD-sp4) 22.3 ml/m^2      SV(MOD-sp2) 15.0 ml      SI(MOD-sp2) 8.3 ml/m^2      Ao root area (BSA corrected) 1.9     LV Tim Vol (BSA corrected) 75.6 ml/m^2      LV Sys Vol (BSA corrected) 53.3 ml/m^2      MV E max leonarda 90.0 cm/sec      MV A max leonarda 112.0 cm/sec      MV E/A 0.8     MV V2 max 120.0 cm/sec      MV max PG 5.8 mmHg      MV V2 mean 91.5 cm/sec      MV mean PG 4.0 mmHg      MV V2 VTI 17.0 cm      MVA(VTI) 1.8 cm^2      MV P1/2t max leonarda 104.0 cm/sec      MV P1/2t 42.7 msec      MVA(P1/2t) 5.2 cm^2      MV dec slope 714.0 cm/sec^2      Ao pk leonarda 128.0 cm/sec      Ao max PG 6.6 mmHg       Ao max PG (full) 2.6 mmHg      Ao V2 mean 90.1 cm/sec      Ao mean PG 4.0 mmHg      Ao mean PG (full) 2.0 mmHg      Ao V2 VTI 16.8 cm      MARKIE(I,A) 1.8 cm^2      MARKIE(I,D) 1.8 cm^2      MARKIE(V,A) 2.2 cm^2      MARKIE(V,D) 2.2 cm^2      LV V1 max PG 4.0 mmHg      LV V1 mean PG 2.0 mmHg      LV V1 max 99.9 cm/sec      LV V1 mean 60.7 cm/sec      LV V1 VTI 10.9 cm      MR max leonarda 385.0 cm/sec      MR max PG 59.3 mmHg      SV(Ao) 161.6 ml      SI(Ao) 89.8 ml/m^2      SV(LVOT) 30.9 ml      SI(LVOT) 17.2 ml/m^2      PA V2 max 104.0 cm/sec      PA max PG 4.3 mmHg      TR max leonarda 366.0 cm/sec      RVSP(TR) 63.6 mmHg      RAP systole 10.0 mmHg      MVA P1/2T LCG 2.1 cm^2       CV ECHO CRISTIANA - BZI_BMI 23.8 kilograms/m^2       CV ECHO CRISTIANA - BSA(HAYCOCK) 1.8 m^2       CV ECHO CRISTIANA - BZI_METRIC_WEIGHT 68.9 kg       CV ECHO CRISTIANA - BZI_METRIC_HEIGHT 170.2 cm      Target HR (85%) 116 bpm      Max. Pred. HR (100%) 137 bpm      Echo EF Estimated 25 %     Narrative:      · Estimated left ventricular EF = 25% Left ventricular ejection fraction   appears to be 21 - 25%. Left ventricular systolic function is moderately   decreased. The left ventricular cavity is mildly dilated. Mid/ Distal   Septum, [place], Anterior wall severely hypokinetic/ dyskinetic Left   ventricular diastolic function is consistent with (grade Ia w/high LAP)   impaired relaxation.  · Mild to moderate mitral valve regurgitation is present.  · Estimated right ventricular systolic pressure from tricuspid   regurgitation is markedly elevated (>55 mmHg).             Results for orders placed during the hospital encounter of 04/10/21    Adult Transthoracic Echo Complete W/ Cont if Necessary Per Protocol    Interpretation Summary  · Estimated left ventricular EF = 25% Left ventricular ejection fraction appears to be 21 - 25%. Left ventricular systolic function is moderately decreased. The left ventricular cavity is mildly dilated. Mid/ Distal Septum, [place], Anterior  wall severely hypokinetic/ dyskinetic Left ventricular diastolic function is consistent with (grade Ia w/high LAP) impaired relaxation.  · Mild to moderate mitral valve regurgitation is present.  · Estimated right ventricular systolic pressure from tricuspid regurgitation is markedly elevated (>55 mmHg).      Imaging Results (Most Recent)       Procedure Component Value Units Date/Time    XR Chest 1 View [[id number]] Collected: 04/10/21 1605     Updated: 04/10/21 1634    Narrative:      PROCEDURE: XR CHEST 1 VW    VIEWS:Single    INDICATION: Shortness of breath    COMPARISON: CXR: 1/5/2018    FINDINGS:       - lines/tubes: None    - cardiac: Size within normal limits.    - mediastinum: Contour within normal limits.     - lungs: No evidence of a focal air space process. Interstitial  and airspace opacities bilaterally, may represent edema or  pneumonia  - pleura: No evidence of  fluid.      - osseous: Unremarkable for age.      Impression:      Interstitial opacities with a few scattered areas of airspace  opacification, may represent edema or pneumonia.      Electronically signed by:  Kiki Benton MD  4/10/2021 4:33 PM CDT  Workstation: [id number]            XR Chest 1 View    Result Date: 4/10/2021  Interstitial opacities with a few scattered areas of airspace opacification, may represent edema or pneumonia. Electronically signed by:  Kiki Benton MD  4/10/2021 4:33 PM CDT Workstation: [id number]      Medication Review:     Current Facility-Administered Medications:     acetaminophen (TYLENOL) tablet 650 mg, 650 mg, Oral, Q4H PRN, Warren Shannon MD    aspirin EC tablet 81 mg, 81 mg, Oral, Daily, Warren Shannon MD, Stopped at 04/13/21 0802    calcium carbonate (TUMS) chewable tablet 500 mg (200 mg elemental), 2 tablet, Oral, Q4H PRN, Imer Chappell MD, 2 tablet at 04/13/21 0124    carvedilol (COREG) tablet 12.5 mg, 12.5 mg, Oral, BID With Meals, LevCha leyva G, APRN, 12.5 mg at 04/13/21 0804     cefTRIAXone (ROCEPHIN) 1 g/100 mL 0.9% NS (MBP), 1 g, Intravenous, Q24H, Warren Shannon MD, Last Rate: 0 mL/hr at 04/12/21 0222, 1 g at 04/12/21 2016    dextrose (D50W) 25 g/ 50mL Intravenous Solution 25 g, 25 g, Intravenous, Q15 Min PRN, Warren Shannon MD    dextrose (GLUTOSE) oral gel 15 g, 15 g, Oral, Q15 Min PRN, Warren Shannon MD    digoxin (LANOXIN) tablet 125 mcg, 125 mcg, Oral, Daily, Sera Ferguson, APRN, 125 mcg at 04/12/21 1210    doxycycline (VIBRAMYCIN) 100 mg/100 mL 0.9% NS MBP, 100 mg, Intravenous, Q12H, Warren Shannon MD, Last Rate: 0 mL/hr at 04/12/21 0925, 100 mg at 04/13/21 0658    finasteride (PROSCAR) tablet 5 mg, 5 mg, Oral, Daily, Warren Shannon MD, 5 mg at 04/13/21 0804    furosemide (LASIX) injection 20 mg, 20 mg, Intravenous, Q12H, Juli Damon MD, 20 mg at 04/13/21 0803    glucagon (human recombinant) (GLUCAGEN DIAGNOSTIC) injection 1 mg, 1 mg, Subcutaneous, Q15 Min PRN, Warren Shannon MD    heparin (porcine) 5000 UNIT/ML injection 5,000 Units, 5,000 Units, Subcutaneous, Q8H, Juli Damon MD, 5,000 Units at 04/12/21 2016    HYDROcodone-acetaminophen (NORCO)  MG per tablet 1 tablet, 1 tablet, Oral, Q6H PRN, Warren Shannon MD, 1 tablet at 04/13/21 0804    insulin aspart (novoLOG) injection 0-7 Units, 0-7 Units, Subcutaneous, TID AC, Warren Shannon MD, 2 Units at 04/13/21 0803    ipratropium-albuterol (DUO-NEB) nebulizer solution 3 mL, 3 mL, Nebulization, Q4H PRN, Imer Chappell MD, 3 mL at 04/12/21 1116    morphine injection 2 mg, 2 mg, Intravenous, Q2H PRN, 2 mg at 04/12/21 1628 **AND** naloxone (NARCAN) injection 0.4 mg, 0.4 mg, Intravenous, Q5 Min PRN, Warren Shannon MD    nitroglycerin (NITROSTAT) SL tablet 0.4 mg, 0.4 mg, Sublingual, Q5 Min PRN, Warren Shannon MD    prochlorperazine (COMPAZINE) injection 5 mg, 5 mg, Intravenous, Q6H PRN, Warren Shannon MD, 5 mg at 04/13/21 0153     rosuvastatin (CRESTOR) tablet 10 mg, 10 mg, Oral, Nightly, Juli Damon MD, 10 mg at 04/12/21 2017    sodium chloride 0.9 % flush 10 mL, 10 mL, Intravenous, PRN, Warren Shannon MD    sodium chloride 0.9 % flush 10 mL, 10 mL, Intravenous, Q12H, Warren Shannon MD, 10 mL at 04/13/21 0803    sodium chloride 0.9 % flush 10 mL, 10 mL, Intravenous, PRN, Warren Shannon MD    tamsulosin (FLOMAX) 24 hr capsule 0.4 mg, 0.4 mg, Oral, Nightly, Warren Shannon MD, 0.4 mg at 04/12/21 2016    Assessment/Plan       NSTEMI (non-ST elevated myocardial infarction) (CMS/[facility])    Type 2 diabetes mellitus without complication, without long-term current use of insulin (CMS/[facility])    Hypertension    Pneumonia due to infectious organism    NSTEMI: Mr. Eze Downing is a 85-year-old  male with past medical history of type 2 diabetes, hypertension, degenerative disc disease, chronic pain, BPH who presented to the ER with shortness of breath and intermittent chest pain.  EKG was performed showing sinus tachycardia with right bundle branch block, rightward axis, diffuse ST-T changes with nonspecific findings.  Troponin levels have been elevated at 0.722 on admission and and peaked at 1.930 consistent with non-ST segment elevation myocardial infarction.  Left heart catheterization was postponed as patient was agitated and confused.  Lengthy/detailed discussion of options with wife.  Options were discussed including noninvasive medical management versus invasive work-up.  Wife fully understands and comprehends the risk and benefits and wishes for invasive left heart catheterization.     -[facility] this evening.    -NPO after light breakfast.    The indications, risks and benefits of diagnostic left heart cardiac catheterization, angiography, conscious sedation, and possible blood transfusion were discussed in detail with the patient. The potential complications of 1/2000 chance of death, 1/1000 chance of heart  attack or stroke, 1/500 chance of bleeding or clotting of the femoral artery, and 1/500 chance of allergic reaction to contrast were discussed. We also reviewed possible complications of infection and kidney dysfunction. If PCI were performed and intra-coronary stents indicated, we discussed the details about  MOLLY. This included a review of the risks of the infrequent, but relatively higher incidence of late thrombosis with MOLLY. The importance of maintaining a consistent daily regimen of aspirin and an additional anti-platelet agent  for as long as directed after implantation was emphasized. No contraindications were found.  The patient  appeared to understand and agree to the above.  C with Dr. Damon.     ASA 3, Mallampati 2.     -Continue aspirin 81 mg daily.  -Continue Coreg 12.5 mg twice daily  -Continue subacute heparin 5000 units every 8 hours.    first presentation of severe systolic heart failure. Etiology: likely ICM. LVEF 25%.  NYHA stage C. FC-IV.   Patient is currently volume overloaded. and with adequate perfusion. The patient's hemodynamics are improving.  -BB: Coreg 12.5 mg twice daily  -ACE/ARB/ENTRESTO: Hold for now due to kidney function planning for cath; likely will add prior to discharge  - DIGOXIN: Continue 125mcg  - IMDUR/HYDRALAZINE: n/a  -DIURETIC: Decrease Lasix to 20mg IV BID  -MRA: The patient is FC-NYHA Class IV and MRA is indicated.   - 6MWT: Will complete as outpatient  -Fluid restriction: 1500 mL a day  -Sodium restriction: 2000 mg day   -ICD:  Indicated.   QRS: 130ms    Bilateral pneumonia: Continue IV antibiotics per primary.  DuoNebs.    Delirium: Likely in the setting of dementia.  Improved today.    Diabetes mellitus type 2: Continue sliding scale insulin    Acute respiratory failure with hypoxia: Currently stable on 4 L.  Wean as condition will allow.    Plan for disposition: Continue current location.              This document has been electronically signed by Sera ALVARES  MART Ferguson on April 13, 2021 08:58 CDT     Electronically signed by Sera Ferguson, MART, 04/13/21, 8:58 AM CDT.    Patient examined and chart reviewed.  Mr. zEe Downing is a 85-year-old  male with past medical history of type 2 diabetes, hypertension, degenerative disc disease, chronic pain, BPH who presented to the ER with shortness of breath and intermittent chest pain.  EKG was performed showing sinus tachycardia with right bundle branch block, rightward axis, diffuse ST-T changes with nonspecific findings.  Troponin levels have been elevated at 0.722 on admission and and peaked at 1.930 consistent with non-ST segment elevation myocardial infarction.  The patient was quite agitated yesterday and confused, crawling out of bed.  This however seems to have improved overnight.     Patient much more alert today.  Patient appropriately responding to question.  Not agitated.  Denied any chest pain or shortness of breath.     Echocardiogram performed yesterday showed:  Estimated left ventricular EF = 25% Left ventricular ejection fraction appears to be 21 - 25%. Left ventricular systolic function is moderately decreased. The left ventricular cavity is mildly dilated. Mid/ Distal Septum, [place], Anterior wall severely hypokinetic/ dyskinetic Left ventricular diastolic function is consistent with (grade Ia w/high LAP) impaired relaxation.  Mild to moderate mitral valve regurgitation is present.  Estimated right ventricular systolic pressure from tricuspid regurgitation is markedly elevated (>55 mmHg).    Definitive evaluation by cardiac catheterization recommended.  All risks and benefits have been explained to the patient in detail and he will be ASA 3 and Mallampati 2.  Further recommendations will follow.  Patient calm and able to understand the situation much better today.  Able to lie flat.  The family is aware of the increased risk for worsening renal dysfunction.

## 2021-04-14 NOTE — PROGRESS NOTES
[facility] Medicine Services  INPATIENT PROGRESS NOTE    Length of Stay: 2  Date of Admission: 4/10/2021  Primary Care Physician: Vielka Ramirez DO    Subjective   Chief Complaint: Chest pain or shortness of breath  HPI: Patient sleeping.  Per his wife he still having some intermittent chest pain, back pain and nausea.    Review of Systems   Constitutional: Negative for appetite change, chills, fatigue and fever.   Respiratory: Negative for chest tightness and shortness of breath.    Cardiovascular: Positive for chest pain. Negative for palpitations and leg swelling.   Gastrointestinal: Positive for nausea. Negative for abdominal pain, constipation, diarrhea and vomiting.   Musculoskeletal: Positive for back pain.   Skin: Negative for wound.   Neurological: Negative for dizziness, weakness, light-headedness, numbness and headaches.      All pertinent negatives and positives are as above. All other systems have been reviewed and are negative unless otherwise stated.     Objective    Temp:  [97.6 °F (36.4 °C)-98.4 °F (36.9 °C)] 98.3 °F (36.8 °C)  Heart Rate:  [] 111  Resp:  [16-20] 18  BP: (106-150)/(57-99) 132/73  Arterial Line BP: (131-150)/(68-83) 131/68    Physical Exam  Vitals reviewed.   Constitutional:       General: He is sleeping.      Appearance: He is well-developed.   HENT:      Head: Normocephalic and atraumatic.   Eyes:      Pupils: Pupils are equal, round, and reactive to light.   Cardiovascular:      Rate and Rhythm: Normal rate and regular rhythm.      Heart sounds: Normal heart sounds. No murmur heard.   No friction rub. No gallop.    Pulmonary:      Effort: Pulmonary effort is normal. No respiratory distress.      Breath sounds: Normal breath sounds. No wheezing or rales.   Chest:      Chest wall: No tenderness.   Abdominal:      General: Bowel sounds are normal. There is no distension.      Palpations: Abdomen is soft.      Tenderness: There is no  abdominal tenderness.   Musculoskeletal:      Cervical back: Normal range of motion and neck supple.   Neurological:      Mental Status: He is easily aroused.   Psychiatric:         Behavior: Behavior normal.       Results Review:  I have reviewed the labs, radiology results, and diagnostic studies.    Laboratory Data:   Results from last 7 days   Lab Units 04/14/21  0213 04/13/21  0523 04/12/21  0606 04/10/21  1539   SODIUM mmol/L 136 133* 133* 132*   POTASSIUM mmol/L 3.8 4.6 3.8 3.7   CHLORIDE mmol/L 97* 97* 95* 95*   CO2 mmol/L 26.0 21.0* 25.0 26.0   BUN mg/dL 56* 45* 36* 19   CREATININE mg/dL 1.13 1.30* 1.29* 1.09   GLUCOSE mg/dL 245* 176* 187* 198*   CALCIUM mg/dL 8.8 9.6 9.2 9.7   BILIRUBIN mg/dL  --   --   --  0.8   ALK PHOS U/L  --   --   --  74   ALT (SGPT) U/L  --   --   --  14   AST (SGOT) U/L  --   --   --  48*   ANION GAP mmol/L 13.0 15.0 13.0 11.0     Estimated Creatinine Clearance: 47.9 mL/min (by C-G formula based on SCr of 1.13 mg/dL).  Results from last 7 days   Lab Units 04/10/21  1539   MAGNESIUM mg/dL 1.2*         Results from last 7 days   Lab Units 04/14/21  0213 04/13/21  0655 04/12/21  0606 04/11/21  0555 04/10/21  1539   WBC 10*3/mm3 10.24 11.71* 13.22* 11.01* 17.16*   HEMOGLOBIN g/dL 9.6* 9.4* 9.6* 9.3* 10.8*   HEMATOCRIT % 27.9* 28.0* 28.7* 25.7* 31.9*   PLATELETS 10*3/mm3 182 144 139* 118* 191     Results from last 7 days   Lab Units 04/10/21  1539   INR  0.98       Culture Data:   No results found for: BLOODCX  No results found for: URINECX  No results found for: RESPCX  No results found for: WOUNDCX  No results found for: STOOLCX  No components found for: BODYFLD    Radiology Data:   Imaging Results (Last 24 Hours)     ** No results found for the last 24 hours. **          I have reviewed the patient's current medications.     Assessment/Plan     Active Hospital Problems    Diagnosis    • **NSTEMI (non-ST elevated myocardial infarction) (CMS/[facility])    • Pneumonia due to infectious  organism    • Hypertension    • Type 2 diabetes mellitus without complication, without long-term current use of insulin (CMS/[facility])        Plan:    1.  Non-ST elevation myocardial infarction: Status post left heart cath.  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  CT surgery consulted.  Patient with poor targets and significant decreased ejection fraction.  Felt to be a high risk surgical candidate.  Plan for medical management at this point.  2.  Bilateral pneumonia: Continue Rocephin and doxycycline.  3.  Diabetes mellitus type 2: Continue sliding scale insulin for now.  4.  Severe systolic congestive heart failure: Medications have been adjusted by cardiology.  Continue current treatment.  5.  Delirium: Likely superimposed on dementia.  6.  DVT prophylaxis: We will restart heparin.        The patient was evaluated during the global COVID-19 pandemic, and the diagnosis was suspected/considered upon their initial presentation.  Evaluation, treatment, and testing were consistent with current guidelines for patients who present with complaints or symptoms that may be related to COVID-19.    Discharge Planning: I expect patient to be discharged to home in 2-3 days.        This document has been electronically signed by Javier Evans MD on April 14, 2021 12:57 CDT

## 2021-04-14 NOTE — PLAN OF CARE
Goal Outcome Evaluation:      Pt with transfer orders. Pt remains confused. Pt retaining urine. In and out cath performed. VSS. Will continue to monitor.

## 2021-04-14 NOTE — PROGRESS NOTES
"[facility] Progress Note   LOS: 2 days   Patient Care Team:  Vielka Ramirez DO as PCP - General    Chief Complaint:    Chief Complaint   Patient presents with    Shortness of Breath    Chest Pain    Heartburn        Subjective     Interval History:   Patient Denies: chest pain  Patient Complaints: Intermittent SOA and wheezing  History taken from: patient chart family     Patient sleeping this morning. Wife at bedside. HR and blood pressure up some today. Started losartan. Increased coreg to 25mg BID. Weaned oxygen to 2L and his current oxygen saturation is 96%. S/p LHC showing severe CAD. Medical management was recommended. Ranexa 500mg BID was started. Kidney function and breathing improved today. Stopped IVF. Changed lasix to 20mg po BID.     Objective     Vital Sign Min/Max for last 24 hours  Temp  Min: 96.8 °F (36 °C)  Max: 98.4 °F (36.9 °C)   BP  Min: 106/57  Max: 150/80   Pulse  Min: 81  Max: 120   Resp  Min: 16  Max: 20   SpO2  Min: 83 %  Max: 99 %   Flow (L/min)  Min: 4  Max: 15   No data recorded     Flowsheet Rows        First Filed Value   Admission Height  170.2 cm (67\") Documented at 04/10/2021 1525   Admission Weight  68 kg (150 lb) Documented at 04/10/2021 1525              04/11/21  0601 04/11/21  1606 04/13/21  0600   Weight: 69.1 kg (152 lb 6.4 oz) 69.1 kg (152 lb 5.4 oz) 68.3 kg (150 lb 8 oz)       Physical Exam:  Physical Exam  Vitals and nursing note reviewed.   Constitutional:       General: He is sleeping. He is not in acute distress.     Appearance: Normal appearance. He is well-developed. He is not diaphoretic.      Interventions: Nasal cannula in place.   HENT:      Head: Normocephalic and atraumatic.      Right Ear: External ear normal.      Left Ear: External ear normal.   Eyes:      General: Lids are normal.      Conjunctiva/sclera: Conjunctivae normal.      Pupils: Pupils are equal, round, and reactive to light.   Neck:      Vascular: No carotid bruit.   Cardiovascular:      Rate " and Rhythm: Regular rhythm. Tachycardia present.      Chest Wall: PMI is not displaced.      Heart sounds: S1 normal and S2 normal. No murmur heard.   No friction rub. No gallop. No S3 sounds.    Pulmonary:      Effort: Pulmonary effort is normal. No respiratory distress.      Breath sounds: No stridor. No wheezing or rales.   Chest:      Chest wall: No tenderness.   Abdominal:      General: Bowel sounds are normal. There is no distension.      Palpations: Abdomen is soft.      Tenderness: There is no abdominal tenderness.   Musculoskeletal:      Cervical back: Normal range of motion and neck supple.      Right lower leg: No edema.      Left lower leg: No edema.   Skin:     General: Skin is warm and dry.      Capillary Refill: Capillary refill takes 2 to 3 seconds.      Findings: No erythema or rash.   Neurological:      Mental Status: Mental status is at baseline.      Cranial Nerves: No cranial nerve deficit.      Comments: Intermittent confusion   Psychiatric:      Comments: Intermittent confusion          Results Review:     Results from last 7 days   Lab Units 04/14/21 0213 04/13/21  0523 04/12/21  0606 04/10/21  1539   SODIUM mmol/L 136 133* 133* 132*   POTASSIUM mmol/L 3.8 4.6 3.8 3.7   CHLORIDE mmol/L 97* 97* 95* 95*   CO2 mmol/L 26.0 21.0* 25.0 26.0   BUN mg/dL 56* 45* 36* 19   CREATININE mg/dL 1.13 1.30* 1.29* 1.09   CALCIUM mg/dL 8.8 9.6 9.2 9.7   BILIRUBIN mg/dL  --   --   --  0.8   ALK PHOS U/L  --   --   --  74   ALT (SGPT) U/L  --   --   --  14   AST (SGOT) U/L  --   --   --  48*   GLUCOSE mg/dL 245* 176* 187* 198*       Estimated Creatinine Clearance: 47.9 mL/min (by C-G formula based on SCr of 1.13 mg/dL).    Results from last 7 days   Lab Units 04/10/21  1539   MAGNESIUM mg/dL 1.2*             Results from last 7 days   Lab Units 04/14/21  0213 04/13/21  0655 04/12/21  0606 04/11/21  0555 04/10/21  1539   WBC 10*3/mm3 10.24 11.71* 13.22* 11.01* 17.16*   HEMOGLOBIN g/dL 9.6* 9.4* 9.6* 9.3* 10.8*      PLATELETS 10*3/mm3 182 144 139* 118* 191       Results from last 7 days   Lab Units 04/10/21  1539   INR  0.98     Lab Results   Component Value Date    PROBNP 14,408.0 (H) 04/10/2021       I/O last 3 completed shifts:  In: 445 [P.O.:240; I.V.:205]  Out: 1200 [Urine:1200]    Cardiographics:  ECG/EMG Results (last 24 hours)       Procedure Component Value Units Date/Time    Adult Transthoracic Echo Complete W/ Cont if Necessary Per Protocol [[id number]] Collected: 04/11/21 1447     Updated: 04/11/21 1808     BSA 1.8 m^2      RVIDd 2.7 cm      IVSd 1.1 cm      LVIDd 5.5 cm      LVIDs 4.9 cm      LVPWd 0.88 cm      IVS/LVPW 1.2     FS 11.1 %      EDV(Teich) 147.4 ml      ESV(Teich) 112.3 ml      EF(Teich) 23.8 %      EDV(cubed) 166.4 ml      ESV(cubed) 116.9 ml      EF(cubed) 29.7 %      LV mass(C)d 208.3 grams      LV mass(C)dI 115.7 grams/m^2      SV(Teich) 35.1 ml      SI(Teich) 19.5 ml/m^2      SV(cubed) 49.4 ml      SI(cubed) 27.5 ml/m^2      Ao root diam 3.5 cm      Ao root area 9.6 cm^2      ACS 1.8 cm      LA dimension 4.0 cm      asc Aorta Diam 3.2 cm      LA/Ao 1.1     LVOT diam 1.9 cm      LVOT area 2.8 cm^2      LVOT area(traced) 2.8 cm^2      LVLd ap4 8.9 cm      EDV(MOD-sp4) 136.0 ml      LVLs ap4 9.0 cm      ESV(MOD-sp4) 95.9 ml      EF(MOD-sp4) 29.5 %      LVLd ap2 9.3 cm      EDV(MOD-sp2) 145.0 ml      LVLs ap2 9.0 cm      ESV(MOD-sp2) 130.0 ml      EF(MOD-sp2) 10.3 %      SV(MOD-sp4) 40.1 ml      SI(MOD-sp4) 22.3 ml/m^2      SV(MOD-sp2) 15.0 ml      SI(MOD-sp2) 8.3 ml/m^2      Ao root area (BSA corrected) 1.9     LV Tim Vol (BSA corrected) 75.6 ml/m^2      LV Sys Vol (BSA corrected) 53.3 ml/m^2      MV E max leonarda 90.0 cm/sec      MV A max leonarda 112.0 cm/sec      MV E/A 0.8     MV V2 max 120.0 cm/sec      MV max PG 5.8 mmHg      MV V2 mean 91.5 cm/sec      MV mean PG 4.0 mmHg      MV V2 VTI 17.0 cm      MVA(VTI) 1.8 cm^2      MV P1/2t max leonarda 104.0 cm/sec      MV P1/2t 42.7 msec      MVA(P1/2t) 5.2 cm^2       MV dec slope 714.0 cm/sec^2      Ao pk leonarda 128.0 cm/sec      Ao max PG 6.6 mmHg      Ao max PG (full) 2.6 mmHg      Ao V2 mean 90.1 cm/sec      Ao mean PG 4.0 mmHg      Ao mean PG (full) 2.0 mmHg      Ao V2 VTI 16.8 cm      MARKIE(I,A) 1.8 cm^2      MARKIE(I,D) 1.8 cm^2      MARKIE(V,A) 2.2 cm^2      MARKIE(V,D) 2.2 cm^2      LV V1 max PG 4.0 mmHg      LV V1 mean PG 2.0 mmHg      LV V1 max 99.9 cm/sec      LV V1 mean 60.7 cm/sec      LV V1 VTI 10.9 cm      MR max leonarda 385.0 cm/sec      MR max PG 59.3 mmHg      SV(Ao) 161.6 ml      SI(Ao) 89.8 ml/m^2      SV(LVOT) 30.9 ml      SI(LVOT) 17.2 ml/m^2      PA V2 max 104.0 cm/sec      PA max PG 4.3 mmHg      TR max leonarda 366.0 cm/sec      RVSP(TR) 63.6 mmHg      RAP systole 10.0 mmHg      MVA P1/2T LCG 2.1 cm^2       CV ECHO CRISTIANA - BZI_BMI 23.8 kilograms/m^2       CV ECHO CRISTIANA - BSA(HAYCOCK) 1.8 m^2       CV ECHO CRISTIANA - BZI_METRIC_WEIGHT 68.9 kg       CV ECHO CRISTIANA - BZI_METRIC_HEIGHT 170.2 cm      Target HR (85%) 116 bpm      Max. Pred. HR (100%) 137 bpm      Echo EF Estimated 25 %     Narrative:      · Estimated left ventricular EF = 25% Left ventricular ejection fraction   appears to be 21 - 25%. Left ventricular systolic function is moderately   decreased. The left ventricular cavity is mildly dilated. Mid/ Distal   Septum, Stanton, Anterior wall severely hypokinetic/ dyskinetic Left   ventricular diastolic function is consistent with (grade Ia w/high LAP)   impaired relaxation.  · Mild to moderate mitral valve regurgitation is present.  · Estimated right ventricular systolic pressure from tricuspid   regurgitation is markedly elevated (>55 mmHg).             Results for orders placed during the hospital encounter of 04/10/21    Adult Transthoracic Echo Complete W/ Cont if Necessary Per Protocol    Interpretation Summary  · Estimated left ventricular EF = 25% Left ventricular ejection fraction appears to be 21 - 25%. Left ventricular systolic function is moderately  decreased. The left ventricular cavity is mildly dilated. Mid/ Distal Septum, Newton, Anterior wall severely hypokinetic/ dyskinetic Left ventricular diastolic function is consistent with (grade Ia w/high LAP) impaired relaxation.  · Mild to moderate mitral valve regurgitation is present.  · Estimated right ventricular systolic pressure from tricuspid regurgitation is markedly elevated (>55 mmHg).      Imaging Results (Most Recent)       Procedure Component Value Units Date/Time    XR Chest 1 View [[id number]] Collected: 04/10/21 1605     Updated: 04/10/21 1634    Narrative:      PROCEDURE: XR CHEST 1 VW    VIEWS:Single    INDICATION: Shortness of breath    COMPARISON: CXR: 1/5/2018    FINDINGS:       - lines/tubes: None    - cardiac: Size within normal limits.    - mediastinum: Contour within normal limits.     - lungs: No evidence of a focal air space process. Interstitial  and airspace opacities bilaterally, may represent edema or  pneumonia  - pleura: No evidence of  fluid.      - osseous: Unremarkable for age.      Impression:      Interstitial opacities with a few scattered areas of airspace  opacification, may represent edema or pneumonia.      Electronically signed by:  Kiki Benton MD  4/10/2021 4:33 PM CDT  Workstation: [id number]            XR Chest 1 View    Result Date: 4/10/2021  Interstitial opacities with a few scattered areas of airspace opacification, may represent edema or pneumonia. Electronically signed by:  Kiki Benton MD  4/10/2021 4:33 PM CDT Workstation: [id number]      Medication Review:     Current Facility-Administered Medications:     acetaminophen (TYLENOL) tablet 650 mg, 650 mg, Oral, Q4H PRN, Juli Damon MD    aspirin EC tablet 81 mg, 81 mg, Oral, Daily, Juli Damon MD, 81 mg at 04/14/21 0837    atropine sulfate injection 0.5 mg, 0.5 mg, Intravenous, Q5 Min PRN, Juli Damon MD    calcium carbonate (TUMS) chewable tablet 500 mg (200 mg  elemental), 2 tablet, Oral, Q4H PRN, Juli Damon MD, 2 tablet at 04/14/21 0602    carvedilol (COREG) tablet 25 mg, 25 mg, Oral, BID With Meals, Juli Damon MD, 25 mg at 04/14/21 0837    cefTRIAXone (ROCEPHIN) 1 g/100 mL 0.9% NS (MBP), 1 g, Intravenous, Q24H, Juli Damon MD, Last Rate: 0 mL/hr at 04/12/21 0222, 1 g at 04/13/21 2153    dextrose (D50W) 25 g/ 50mL Intravenous Solution 25 g, 25 g, Intravenous, Q15 Min PRN, Juli Damon MD    dextrose (GLUTOSE) oral gel 15 g, 15 g, Oral, Q15 Min PRN, Juli Damon MD    digoxin (LANOXIN) tablet 125 mcg, 125 mcg, Oral, Daily, Juli Damon MD, Stopped at 04/13/21 1244    doxycycline (VIBRAMYCIN) 100 mg/100 mL 0.9% NS MBP, 100 mg, Intravenous, Q12H, Juli Damon MD, Last Rate: 0 mL/hr at 04/12/21 0925, 100 mg at 04/14/21 0722    finasteride (PROSCAR) tablet 5 mg, 5 mg, Oral, Daily, Juli Damon MD, 5 mg at 04/14/21 0837    furosemide (LASIX) tablet 20 mg, 20 mg, Oral, BID, Sera Ferguson APRN    glucagon (human recombinant) (GLUCAGEN DIAGNOSTIC) injection 1 mg, 1 mg, Subcutaneous, Q15 Min PRN, Juli Damon MD    HYDROcodone-acetaminophen (NORCO)  MG per tablet 1 tablet, 1 tablet, Oral, Q6H PRN, Juli Damon MD, 1 tablet at 04/14/21 0602    insulin aspart (novoLOG) injection 0-7 Units, 0-7 Units, Subcutaneous, TID AC, Juli Damon MD, 5 Units at 04/14/21 0722    ipratropium-albuterol (DUO-NEB) nebulizer solution 3 mL, 3 mL, Nebulization, Q4H PRN, Juli Damon MD, 3 mL at 04/13/21 1351    losartan (COZAAR) tablet 25 mg, 25 mg, Oral, Q24H, Juli Damon MD, 25 mg at 04/14/21 0837    morphine injection 2 mg, 2 mg, Intravenous, Q2H PRN, 2 mg at 04/12/21 1628 **AND** naloxone (NARCAN) injection 0.4 mg, 0.4 mg, Intravenous, Q5 Min PRN, Juli Damon MD    nitroglycerin  (NITROSTAT) SL tablet 0.4 mg, 0.4 mg, Sublingual, Q5 Min PRN, Juli Damon MD    prochlorperazine (COMPAZINE) injection 5 mg, 5 mg, Intravenous, Q6H PRN, Juli Damon MD, 5 mg at 04/14/21 0503    ranolazine (RANEXA) 12 hr tablet 500 mg, 500 mg, Oral, Q12H, Juli Damon MD, 500 mg at 04/14/21 0837    rosuvastatin (CRESTOR) tablet 10 mg, 10 mg, Oral, Nightly, Juli Damon MD, 10 mg at 04/13/21 2127    sodium chloride 0.9 % flush 10 mL, 10 mL, Intravenous, PRN, Juli Damon MD    sodium chloride 0.9 % flush 10 mL, 10 mL, Intravenous, Q12H, Juli Damon MD, 10 mL at 04/14/21 0838    sodium chloride 0.9 % flush 10 mL, 10 mL, Intravenous, PRN, Juli Damon MD    sodium chloride 0.9 % infusion 250 mL, 250 mL, Intravenous, Once PRN, Juli Damon MD    tamsulosin (FLOMAX) 24 hr capsule 0.4 mg, 0.4 mg, Oral, Nightly, Juli Damon MD, 0.4 mg at 04/13/21 2127    Assessment/Plan       NSTEMI (non-ST elevated myocardial infarction) (CMS/HCC)    Type 2 diabetes mellitus without complication, without long-term current use of insulin (CMS/HCC)    Hypertension    Pneumonia due to infectious organism    NSTEMI: Mr. Eze Downing is a 85-year-old  male with past medical history of type 2 diabetes, hypertension, degenerative disc disease, chronic pain, BPH who presented to the ER with shortness of breath and intermittent chest pain.  EKG was performed showing sinus tachycardia with right bundle branch block, rightward axis, diffuse ST-T changes with nonspecific findings.  Troponin levels have been elevated at 0.722 on admission and and peaked at 1.930 consistent with non-ST segment elevation myocardial infarction. Lengthy/detailed discussion of options with wife.  Options were discussed with wife and she opted for invasive workup with C per Dr. Damon. LHC showing severe MVD, extensive left to left  and left to right collateral. During the procedure, patient developed severe symptomatic bradycardia and asystole requiring  resuscitation as per ACLS protocol for less than 2 minutes and placement of a temporary transvenous pacemaker. Afterwards, temporary pacemaker was removed. He remains hemodynamically stable and responding appropriately this morning. He is oxygenating well on 2L NC.     -CTVS was consulted for severe MVD given he is not a candidate for PCI. CTVS consult concluded poor targets for bypass, severe physical deconditioning. Patient would be high risk and medical management was recommended.     -Continue aspirin 81 mg daily.  -Continue lipid lowering therapy with rosuvastatin 10mg   -Increase Coreg 25 mg twice daily  -Start Ranexa 500mg BID      first presentation of severe systolic heart failure. Etiology: likely ICM. LVEF 25%.  NYHA stage C. FC-IV.   Patient is currently with improving volume status and with adequate perfusion. The patient's hemodynamics are improving.  -BB: Coreg increase to 25mg BID  -ACE/ARB/ENTRESTO: Start losartan 25mg   - DIGOXIN: Continue 125mcg  - IMDUR/HYDRALAZINE: n/a  -DIURETIC: Change to lasix 20mg po BID.   -MRA: The patient is FC-NYHA Class IV and MRA is indicated.   - 6MWT: Will complete as outpatient  -Fluid restriction: 1500 mL a day  -Sodium restriction: 2000 mg day   -ICD:  Indicated.   QRS: 130ms    Bilateral pneumonia: Continue IV antibiotics per primary.  DuoNebs.    Delirium: Likely in the setting of dementia.  Improved today.    Diabetes mellitus type 2: Continue sliding scale insulin    Acute respiratory failure with hypoxia: Currently stable on 2 L.  Wean as condition will allow.    Plan for disposition: May transfer to               This document has been electronically signed by MART Zamorano on April 14, 2021 08:58 CDT     Electronically signed by MART Zamorano, 04/14/21, 9:03 AM CDT.    Patient examined and chart reviewed:     Eze Downing is a 85-year-old  male with history of type 2 diabetes, hypertension, degenerative disc disease, chronic pain, mild dementia, BPH who presented with > 1 week history of dyspnea on exertion and intermittent chest pain.  EKG showed right bundle branch block and diffuse nonspecific ST-T changes.  Troponins were elevated and peaked at 1.93 suggesting a non-ST segment elevation myocardial infarction.  Echocardiogram showed LV dysfunction with an ejection fraction of 20 to 25% with hypokinesis of the septum, apex, anterio lateral wall and pulmonary hypertension.  Definitive evaluation by cardiac catheterization was recommended.  All risks and benefits were explained and the patient and family were aware of increased risks in view of multiple comorbidities.     Patient underwent cardiac catheterization yesterday and this showed the following findings     Impression: Severe multivessel coronary artery disease with critical lesions noted in the left anterior descending coronary artery, diagonal coronary artery, ramus intermedius coronary artery, circumflex coronary artery, obtuse marginal coronary artery and right coronary artery as described above.  Extensive left to left and left to right collaterals.  Patient is known to have LV dysfunction with an ejection fraction of 20 to 25% by echocardiogram  The patient developed severe symptomatic bradycardia and asystole requiring resuscitation as per ACLS protocol for less than 2 minutes and placement of a temporary transvenous pacemaker.  The patient became hemodynamically stable and remained awake and responding appropriately to commands post resuscitation, maintaining his oxygenation.    Treatment options were discussed with the patient's family in detail and a CT surgery consultation appreciated.  It was felt that because of deconditioning, poor targets, he is not a good candidate for CABG and risks would outweigh the benefits.  Maximal medical therapy  was recommended.    The patient has done reasonably well overnight with no episodes of chest pain.  He is more awake and alert    Pulse rate was 88 bpm regular, blood pressure 120/70 mmHg.  Monitor showed sinus rhythm.  Exam the heart showed normal first and second heart sounds with no S3 or S4.  No murmurs audible.  Lung exam showed decreased breath sounds in both lung bases.  No rales audible.  Abdomen soft with no mass or tenderness.  Periphery showed no edema.    His labs were reviewed.  His BUN was 56 and creatinine 1.13 with a hemoglobin of 9.6.    Continue maximal medical therapy with increasing dose of carvedilol to 25 mg twice a day.  Lasix changed to p.o.  Losartan 25 mg daily has been initiated.  Digoxin 125 mcg daily, Ranexa 500 mg twice daily will be continued.  Crestor 10 mg daily, and antiplatelet therapy with aspirin has been continued.  Long-term prognosis guarded.  Patient will be transferred to 3 W. telemetry.

## 2021-04-15 PROBLEM — I50.21 ACUTE SYSTOLIC CONGESTIVE HEART FAILURE (HCC): Status: ACTIVE | Noted: 2021-01-01

## 2021-04-15 NOTE — PLAN OF CARE
"Goal Outcome Evaluation:  Plan of Care Reviewed With: patient, family  Progress: declining  Outcome Summary: Patient continued to have urinary retention as I came on shift. Bladder scans were performed and a mckeon was eventually placed per MD. Urine output was adequate over night. Patient continues to have \"severe\" and \"sharp/Stabbing\" chest pain every so often. Patient did have some relief with morphine and his hydrocodone 10 at different intervals. Family remained at bedside to help reorient Patient to situation and surroundings. They were very much great help.  "

## 2021-04-15 NOTE — THERAPY EVALUATION
Acute Care - Occupational Therapy Initial Evaluation  [facility]     Patient Name: Eze Downing  : 1935  MRN: [id number]  Today's Date: 4/15/2021  Onset of Illness/Injury or Date of Surgery: 04/10/21  Date of Referral to OT: 04/15/21  Referring Physician: LORE CEVALLOS    Admit Date: 4/10/2021       ICD-10-CM ICD-9-CM   1. Pneumonia due to infectious organism, unspecified laterality, unspecified part of lung  J18.9 486   2. NSTEMI (non-ST elevated myocardial infarction) (CMS/HCC)  I21.4 410.70   3. Oral phase dysphagia  R13.11 787.21   4. Impaired mobility and activities of daily living  Z74.09 V49.89    Z78.9      Patient Active Problem List   Diagnosis   • Non-recurrent unilateral inguinal hernia without obstruction or gangrene   • Retention of urine   • Enlarged prostate with urinary obstruction   • BPH (benign prostatic hyperplasia)   • Type 2 diabetes mellitus without complication, without long-term current use of insulin (CMS/[facility])   • Multiple joint pain   • Hypertension   • Gastroesophageal reflux disease   • Chronic obstructive pulmonary disease (CMS/HCC)   • Chronic low back pain without sciatica   • At high risk for falls   • Pneumonia due to infectious organism   • NSTEMI (non-ST elevated myocardial infarction) (CMS/HCC)   • Acute systolic congestive heart failure (CMS/HCC)     Past Medical History:   Diagnosis Date   • Arthritis    • Broken ribs 2018   • Diabetes mellitus (CMS/HCC)    • Hypertension    • Inguinal hernia      Past Surgical History:   Procedure Laterality Date   • APPENDECTOMY     • CARDIAC CATHETERIZATION N/A 2021    Procedure: Left Heart Cath;  Surgeon: Juli Damon MD;  Location: [facility] CATH INVASIVE LOCATION;  Service: Cardiovascular;  Laterality: N/A;   • CYSTOSCOPY N/A 2018    Procedure: CYSTOSCOPY;  Surgeon: Michael Forbes MD;  Location: [facility] OR;  Service: Urology   • CYSTOSCOPY TRANSURETHRAL RESECTION OF PROSTATE  N/A 5/9/2018    Procedure: CYSTOSCOPY TRANSURETHRAL RESECTION OF PROSTATE; PLACEMENT SUPRAPUBIC CATHETER;  Surgeon: Michael Forbes MD;  Location: [facility] OR;  Service: Urology   • INGUINAL HERNIA REPAIR Left 4/19/2018    Procedure: OPEN REPAIR OF A LARGE INGUINAL HERNIA;  Surgeon: Ramin Collado MD;  Location: [facility];  Service: General            OT ASSESSMENT FLOWSHEET (last 12 hours)      OT Evaluation and Treatment     Row Name 04/15/21 1538                   OT Time and Intention    Subjective Information  complains of;pain  -RB        Document Type  evaluation  -RB        Mode of Treatment  individual therapy;occupational therapy  -RB        Patient Effort  good  -RB           General Information    Patient Profile Reviewed  yes  -RB        Onset of Illness/Injury or Date of Surgery  04/10/21  -RB        Referring Physician  LORE CEVALLOS  -RB        Patient/Family/Caregiver Comments/Observations  Wife in room.  -RB        General Observations of Patient  Supine in bed with mckeon, 3L 02, tele and IV access.  -RB        Prior Level of Function  independent:;gait;transfer;ADL's  -RB        Equipment Currently Used at Home  walker, rolling;shower chair;wheelchair, motorized;grab bar  -RB        Existing Precautions/Restrictions  fall;oxygen therapy device and L/min;cardiac  -RB        Equipment Issued to Patient  gait belt  -RB        Risks Reviewed  patient:;spouse/S.O.:;LOB  -RB        Benefits Reviewed  patient:;spouse/S.O.:;improve function;increase independence;increase strength  -RB        Barriers to Rehab  medically complex;previous functional deficit  -RB           Living Environment    Current Living Arrangements  home/apartment/condo  -RB        Home Accessibility  wheelchair accessible Ramp with L hand rail.  -RB        Lives With  spouse  -RB           Home Use of Assistive/Adaptive Equipment    Equipment Currently Used at Home  cane, straight;wheelchair, motorized;grab bar;shower  chair;walker, rolling  -RB           Cognition    Affect/Mental Status (Cognitive)  WFL  -RB        Orientation Status (Cognition)  oriented to;person;place  -RB           Pain Assessment    Additional Documentation  Pain Scale: Numbers Pre/Post-Treatment (Group)  -RB           Pain Scale: Numbers Pre/Post-Treatment    Pretreatment Pain Rating  8/10  -RB        Posttreatment Pain Rating  8/10  -RB        Pain Location  chest;back and chest.  -RB        Pain Intervention(s)  Medication (See MAR)  -RB           Range of Motion Comprehensive    General Range of Motion  no range of motion deficits identified;bilateral upper extremity ROM WNL  -RB           Strength Comprehensive (MMT)    General Manual Muscle Testing (MMT) Assessment  other (see comments)  -RB        Comment, General Manual Muscle Testing (MMT) Assessment  B UE strength was grossly 4/5.  -RB           Bed Mobility    Bed Mobility  supine-sit;sit-supine  -RB        Supine-Sit [place] (Bed Mobility)  minimum assist (75% patient effort)  -RB        Sit-Supine [place] (Bed Mobility)  contact guard  -RB        Bed Mobility, Safety Issues  decreased use of arms for pushing/pulling;decreased use of legs for bridging/pushing  -RB        Assistive Device (Bed Mobility)  bed rails;head of bed elevated  -RB           Functional Mobility    Functional Mobility- Ind. Level  minimum assist (75% patient effort);moderate assist (50% patient effort)  -RB        Functional Mobility- Device  rolling walker  -RB        Functional Mobility-Distance (Feet)  2  -RB        Functional Mobility- Safety Issues  balance decreased during turns;sequencing ability decreased;step length decreased;weight-shifting ability decreased;loses balance backward;supplemental O2  -RB           Transfer Assessment/Treatment    Transfers  sit-stand transfer;stand-sit transfer  -RB           Transfers    Sit-Stand [place] (Transfers)  minimum assist (75% patient effort)  -RB         Stand-Sit [place] (Transfers)  minimum assist (75% patient effort)  -RB           Sit-Stand Transfer    Assistive Device (Sit-Stand Transfers)  walker, 4-wheeled  -RB           Stand-Sit Transfer    Assistive Device (Stand-Sit Transfers)  walker, 4-wheeled  -RB           Safety Issues, Functional Mobility    Safety Issues Affecting Function (Mobility)  safety precaution awareness;safety precautions follow-through/compliance;sequencing abilities  -RB        Impairments Affecting Function (Mobility)  balance;coordination;endurance/activity tolerance;pain;shortness of breath;strength  -RB           Activities of Daily Living    BADL Assessment/Intervention  lower body dressing  -RB           Lower Body Dressing Assessment/Training    [place] Level (Lower Body Dressing)  lower body dressing skills;don;socks;minimum assist (75% patient effort)  -RB        Position (Lower Body Dressing)  edge of bed sitting;supported sitting  -RB           Plan of Care Review    Plan of Care Reviewed With  spouse  -RB           Vital Signs    Pre Systolic BP Rehab  110  -RB        Pre Treatment Diastolic BP  73  -RB        Post Systolic BP Rehab  166  -RB        Post Treatment Diastolic BP  75  -RB        Pretreatment Heart Rate (beats/min)  84  -RB        Posttreatment Heart Rate (beats/min)  89  -RB        Pre SpO2 (%)  92  -RB        O2 Delivery Pre Treatment  supplemental O2 2L  -RB        Intra SpO2 (%)  88  -RB        O2 Delivery Intra Treatment  supplemental O2  -RB        Post SpO2 (%)  91  -RB        O2 Delivery Post Treatment  supplemental O2  -RB           OT Goals    Transfer Goal Selection (OT)  transfer, OT goal 1  -RB        Bathing Goal Selection (OT)  bathing, OT goal 1  -RB        Dressing Goal Selection (OT)  dressing, OT goal 1  -RB        Toileting Goal Selection (OT)  toileting, OT goal 1  -RB        Activity Tolerance Goal Selection (OT)  activity tolerance, OT goal 1  -RB           Transfer Goal 1 (OT)     Activity/Assistive Device (Transfer Goal 1, OT)  transfers, all  -RB        [place] Level/Cues Needed (Transfer Goal 1, OT)  contact guard assist  -RB        Time Frame (Transfer Goal 1, OT)  long term goal (LTG)  -RB        Progress/Outcome (Transfer Goal 1, OT)  goal not met  -RB           Bathing Goal 1 (OT)    Activity/Device (Bathing Goal 1, OT)  bathing skills, all  -RB        [place] Level/Cues Needed (Bathing Goal 1, OT)  contact guard assist  -RB        Time Frame (Bathing Goal 1, OT)  long term goal (LTG)  -RB        Progress/Outcomes (Bathing Goal 1, OT)  goal not met  -RB           Dressing Goal 1 (OT)    Activity/Device (Dressing Goal 1, OT)  dressing skills, all  -RB        [place]/Cues Needed (Dressing Goal 1, OT)  contact guard assist  -RB        Time Frame (Dressing Goal 1, OT)  long term goal (LTG)  -RB        Progress/Outcome (Dressing Goal 1, OT)  goal not met  -RB           Toileting Goal 1 (OT)    Activity/Device (Toileting Goal 1, OT)  toileting skills, all  -RB        [place] Level/Cues Needed (Toileting Goal 1, OT)  contact guard assist  -RB        Time Frame (Toileting Goal 1, OT)  long term goal (LTG)  -RB        Progress/Outcome (Toileting Goal 1, OT)  goal not met  -RB            Activity Tolerance Goal 1 (OT)    Activity Tolerance Goal 1 (OT)  20' ADL therapeutic act with 1-2 rest breaks.  -RB        Activity Level (Endurance Goal 1, OT)  15 min activity;O2 sat >/ equal to 88%  -RB        Time Frame (Activity Tolerance Goal 1, OT)  long term goal (LTG)  -RB        Progress/Outcome (Activity Tolerance Goal 1, OT)  goal not met  -RB           Positioning and Restraints    Pre-Treatment Position  in bed  -RB        Post Treatment Position  bed  -RB        In Bed  supine;call light within reach;encouraged to call for assist;exit alarm on;with family/caregiver  -RB           Therapy Assessment/Plan (OT)    Date of Referral to OT  04/15/21  -RB        Functional Level at  Time of Evaluation (OT)  Imp mob and ADLs.  -RB        OT Diagnosis  Imp mob and ADLs.  -RB        Rehab Potential (OT)  fair, will monitor progress closely  -RB        Criteria for Skilled Therapeutic Interventions Met (OT)  yes;meets criteria  -RB        Therapy Frequency (OT)  other (see comments) 5-7 days/wk  -RB        Predicted Duration of Therapy Intervention (OT)  Until D/C or goals met.  -RB        Problem List (OT)  problems related to;balance;coordination;mobility;motor control;strength;pain;inability to direct their own care  -RB        Activity Limitations Related to Problem List (OT)  unable to ambulate safely;unable to transfer safely;BADLs not performed adequately or safely;IADLs not performed adequately or safely  -RB        Planned Therapy Interventions (OT)  activity tolerance training;adaptive equipment training;BADL retraining;functional balance retraining;occupation/activity based interventions;patient/caregiver education/training;ROM/therapeutic exercise;transfer/mobility retraining;strengthening exercise  -RB           Evaluation Complexity (OT)    Review Occupational Profile/Medical/Therapy History Complexity  moderate complexity  -RB        Assessment, Occupational Performance/Identification of Deficit Complexity  moderate complexity  -RB        Clinical Decision Making Complexity (OT)  moderate complexity  -RB        Overall Complexity of Evaluation (OT)  moderate complexity  -RB           Therapy Plan Review/Discharge Plan (OT)    Anticipated Discharge Disposition (OT)  home with 24/7 care;home with assist  -RB          User Key  (r) = Recorded By, (t) = Taken By, (c) = Cosigned By    Initials Name Effective Dates    RB Paolo Arriaga, OT 07/24/19 -          Occupational Therapy Education                 Title: PT OT SLP Therapies (In Progress)     Topic: Occupational Therapy (In Progress)     Point: ADL training (Not Started)     Description:   Instruct learner(s) on proper safety  adaptation and remediation techniques during self care or transfers.   Instruct in proper use of assistive devices.              Learner Progress:  Not documented in this visit.          Point: Home exercise program (Not Started)     Description:   Instruct learner(s) on appropriate technique for monitoring, assisting and/or progressing therapeutic exercises/activities.              Learner Progress:  Not documented in this visit.          Point: Precautions (Done)     Description:   Instruct learner(s) on prescribed precautions during self-care and functional transfers.              Learning Progress Summary           Patient Acceptance, E, VU by GERALDO at 4/15/2021 1641    Comment: Edu pt on use of gait belt and non skid socks when OOB and no OOB without assist.                   Point: Body mechanics (Not Started)     Description:   Instruct learner(s) on proper positioning and spine alignment during self-care, functional mobility activities and/or exercises.              Learner Progress:  Not documented in this visit.                      User Key     Initials Effective Dates Name Provider Type Discipline    GERALDO 07/24/19 -  Paolo Arriaga, OT Occupational Therapist OT                  OT Recommendation and Plan  Planned Therapy Interventions (OT): activity tolerance training, adaptive equipment training, BADL retraining, functional balance retraining, occupation/activity based interventions, patient/caregiver education/training, ROM/therapeutic exercise, transfer/mobility retraining, strengthening exercise  Therapy Frequency (OT): other (see comments) (5-7 days/wk)  Plan of Care Review  Plan of Care Reviewed With: patient, spouse  Outcome Summary: OT eval on this date.  Pt required min A for bed mobility.  He needed min A for sit to stand to sit and mod A for 2-3 steps to head of bed.  Pt was min A to don sock while sitting EOB.  He could benefit from OT services to regain lost function for ADLs and functional mob.   Rec home with cont OT/PT when D/C from hospital.  Plan of Care Reviewed With: patient, spouse  Outcome Summary: OT eval on this date.  Pt required min A for bed mobility.  He needed min A for sit to stand to sit and mod A for 2-3 steps to head of bed.  Pt was min A to don sock while sitting EOB.  He could benefit from OT services to regain lost function for ADLs and functional mob.  Rec home with cont OT/PT when D/C from hospital.    Outcome Measures     Row Name 04/15/21 1538             How much help from another is currently needed...    Putting on and taking off regular lower body clothing?  2  -RB      Bathing (including washing, rinsing, and drying)  2  -RB      Toileting (which includes using toilet bed pan or urinal)  2  -RB      Putting on and taking off regular upper body clothing  2  -RB      Taking care of personal grooming (such as brushing teeth)  3  -RB      Eating meals  4  -RB      AM-PAC 6 Clicks Score (OT)  15  -RB         Functional Assessment    Outcome Measure Options  AM-PAC 6 Clicks Daily Activity (OT)  -RB        User Key  (r) = Recorded By, (t) = Taken By, (c) = Cosigned By    Initials Name Provider Type    RB Paolo Arriaga OT Occupational Therapist          Time Calculation:   Time Calculation- OT     Row Name 04/15/21 1644             Time Calculation- OT    OT Start Time  1538  -RB      OT Stop Time  1617  -RB      OT Time Calculation (min)  39 min  -RB      OT Received On  04/15/21  -RB      OT Goal Re-Cert Due Date  04/28/21  -RB        User Key  (r) = Recorded By, (t) = Taken By, (c) = Cosigned By    Initials Name Provider Type    Paolo Landeros OT Occupational Therapist        Therapy Charges for Today     Code Description Service Date Service Provider Modifiers Qty    43828094242 HC OT EVAL MOD COMPLEXITY 3 4/15/2021 Paolo Arriaga OT GO 1               Paolo Arriaga OT  4/15/2021

## 2021-04-15 NOTE — PLAN OF CARE
Goal Outcome Evaluation:  Plan of Care Reviewed With: patient  Progress: no change   Pt still complaining of persistent chest pain - likely related to chest compressions when he coded in cath lab 2 days ago. PRN analgesics appear to help. Pt is fairly oriented, though he asked several times when he was going to have the heart cath. Pt's appetite has been poor, saying he just isn't hungry. Vital signs stable.

## 2021-04-15 NOTE — NURSING NOTE
Report given to Linda RN at this time. Awaiting to transfer patient until after shift change due to staffing concerns. Also, Patient's family has requested not to move patient due to him having severe confusion/dementia especially at night time.

## 2021-04-15 NOTE — PROGRESS NOTES
[facility] Medicine Services  INPATIENT PROGRESS NOTE    Length of Stay: 3  Date of Admission: 4/10/2021  Primary Care Physician: Vielka Ramirez DO    Subjective   Chief Complaint: shortness of breath  HPI:  85 year old male with a history of DMII, HTN, chronic pain, OA, and BPH who presented with shortness of breath, fatigue, and chest pain. He was found to have ST depression and T wave inversions on EKG with elevated troponin.  Chest x-ray noted interstitial opacities with scattered areas of airspace opacification.  He was initiated on antibiotics and bronchodilators. Cardiology consulted and recommended LHC.  LHC revealed severe multivessel disease. Echocardiogram notes EF 20-25%.  He developed severe symptomatic bradycardia and asystole requiring resuscitation as per ACLS protocol for less than 2 minutes and placement of a temporary transvenous pacemaker.  He was evaluated by CT surgery and it was felt that due to deconditioning and poor targets he was a poor candidate for CABG.  He is recommended maximum medical therapy.     Review of Systems   Constitutional: Negative for chills and fever.   Respiratory: Positive for cough, shortness of breath and wheezing.    Cardiovascular: Negative for chest pain.   Gastrointestinal: Negative for abdominal pain, nausea and vomiting.        All pertinent negatives and positives are as above. All other systems have been reviewed and are negative unless otherwise stated.     Objective    Temp:  [97.1 °F (36.2 °C)-98.3 °F (36.8 °C)] 97.1 °F (36.2 °C)  Heart Rate:  [74-98] 86  Resp:  [18] 18  BP: (106-149)/(56-84) 121/69    Physical Exam  Vitals reviewed.   Constitutional:       General: He is not in acute distress.     Appearance: Normal appearance.   HENT:      Head: Normocephalic and atraumatic.   Cardiovascular:      Rate and Rhythm: Normal rate and regular rhythm.   Pulmonary:      Effort: Pulmonary effort is normal. No respiratory  distress.      Breath sounds: Wheezing present.   Abdominal:      General: There is no distension.      Palpations: Abdomen is soft.      Tenderness: There is no abdominal tenderness.   Musculoskeletal:         General: No swelling or deformity.   Skin:     General: Skin is warm and dry.   Neurological:      General: No focal deficit present.      Mental Status: He is alert. Mental status is at baseline.             Results Review:  I have reviewed the labs, radiology results, and diagnostic studies.    Laboratory Data:   Results from last 7 days   Lab Units 04/15/21  0612 04/14/21 0213 04/13/21  0523 04/10/21  1539   SODIUM mmol/L 138 136 133* 132*   POTASSIUM mmol/L 4.0 3.8 4.6 3.7   CHLORIDE mmol/L 100 97* 97* 95*   CO2 mmol/L 31.0* 26.0 21.0* 26.0   BUN mg/dL 60* 56* 45* 19   CREATININE mg/dL 1.08 1.13 1.30* 1.09   GLUCOSE mg/dL 186* 245* 176* 198*   CALCIUM mg/dL 9.0 8.8 9.6 9.7   BILIRUBIN mg/dL  --   --   --  0.8   ALK PHOS U/L  --   --   --  74   ALT (SGPT) U/L  --   --   --  14   AST (SGOT) U/L  --   --   --  48*   ANION GAP mmol/L 7.0 13.0 15.0 11.0     Estimated Creatinine Clearance: 50.1 mL/min (by C-G formula based on SCr of 1.08 mg/dL).  Results from last 7 days   Lab Units 04/10/21  1539   MAGNESIUM mg/dL 1.2*         Results from last 7 days   Lab Units 04/15/21  0612 04/14/21  0213 04/13/21  0655 04/12/21  0606 04/11/21  0555   WBC 10*3/mm3 11.17* 10.24 11.71* 13.22* 11.01*   HEMOGLOBIN g/dL 9.2* 9.6* 9.4* 9.6* 9.3*   HEMATOCRIT % 27.9* 27.9* 28.0* 28.7* 25.7*   PLATELETS 10*3/mm3 194 182 144 139* 118*     Results from last 7 days   Lab Units 04/10/21  1539   INR  0.98       Culture Data:   No results found for: BLOODCX  No results found for: URINECX  No results found for: RESPCX  No results found for: WOUNDCX  No results found for: STOOLCX  No components found for: BODYFLD    Radiology Data:   Imaging Results (Last 24 Hours)     ** No results found for the last 24 hours. **          I have  reviewed the patient's current medications.     Assessment/Plan     Active Hospital Problems    Diagnosis    • **NSTEMI (non-ST elevated myocardial infarction) (CMS/[facility])    • Acute systolic congestive heart failure (CMS/[facility])    • Pneumonia due to infectious organism    • Hypertension    • Type 2 diabetes mellitus without complication, without long-term current use of insulin (CMS/[facility])        Plan:    S/P LHC revealing severe multivessel disease.  Poor candidate for CABG and recommended maximum medical management.  Aspirin  Crestor  Beta-blocker: Coreg 25 mg PO BID  ACEI/ARB: Cozaar 25 mg PO daily  Digoxin 125 mcg daily  Lasix 20 mg PO BID  Ranexa 500 mg PO BID  Cardiology consultation appreciated  Rocephin day 6/7  Doxycycline, 5 days completed  DuHANNY paul  Solumedrol 40 mg IV q 8 hours  Glucose control: SSI  VTE PPx: heparin      The patient was evaluated during the global COVID-19 pandemic, and the diagnosis was suspected/considered upon their initial presentation.  Evaluation, treatment, and testing were consistent with current guidelines for patients who present with complaints or symptoms that may be related to COVID-19.        This document has been electronically signed by MART Osei on April 15, 2021 13:32 CDT

## 2021-04-15 NOTE — PROGRESS NOTES
"[facility] Cardiology Progress Note   LOS: 3 days   Patient Care Team:  Vielka Ramirez DO as PCP - General    Chief Complaint:    Chief Complaint   Patient presents with    Shortness of Breath    Chest Pain    Heartburn        Subjective     Interval History:   Patient Denies: chest pain  Patient Complaints: Intermittent SOA and wheezing  History taken from: patient chart family     Patient did complain of some intermittent chest pain over the night which was relieved with morphine and hydrocodone. He was started on Ranexa.  Kidney function improved today. Hemodynamics stable.     Objective     Vital Sign Min/Max for last 24 hours  Temp  Min: 97.6 °F (36.4 °C)  Max: 98.3 °F (36.8 °C)   BP  Min: 88/53  Max: 149/84   Pulse  Min: 74  Max: 98   Resp  Min: 18  Max: 18   SpO2  Min: 90 %  Max: 97 %   Flow (L/min)  Min: 1  Max: 4   No data recorded     Flowsheet Rows        First Filed Value   Admission Height  170.2 cm (67\") Documented at 04/10/2021 1525   Admission Weight  68 kg (150 lb) Documented at 04/10/2021 1525              04/11/21  0601 04/11/21  1606 04/13/21  0600   Weight: 69.1 kg (152 lb 6.4 oz) 69.1 kg (152 lb 5.4 oz) 68.3 kg (150 lb 8 oz)       Physical Exam:  Physical Exam  Vitals and nursing note reviewed.   Constitutional:       General: He is sleeping. He is not in acute distress.     Appearance: Normal appearance. He is well-developed. He is not diaphoretic.      Interventions: Nasal cannula in place.   HENT:      Head: Normocephalic and atraumatic.      Right Ear: External ear normal.      Left Ear: External ear normal.   Eyes:      General: Lids are normal.      Conjunctiva/sclera: Conjunctivae normal.      Pupils: Pupils are equal, round, and reactive to light.   Neck:      Vascular: No carotid bruit.   Cardiovascular:      Rate and Rhythm: Normal rate and regular rhythm.      Chest Wall: PMI is not displaced.      Heart sounds: S1 normal and S2 normal. No murmur heard.   No friction rub. No gallop. No S3 " sounds.    Pulmonary:      Effort: Pulmonary effort is normal. No respiratory distress.      Breath sounds: No stridor. No wheezing or rales.   Chest:      Chest wall: No tenderness.   Abdominal:      General: Bowel sounds are normal. There is no distension.      Palpations: Abdomen is soft.      Tenderness: There is no abdominal tenderness.   Musculoskeletal:      Cervical back: Normal range of motion and neck supple.      Right lower leg: No edema.      Left lower leg: No edema.   Skin:     General: Skin is warm and dry.      Capillary Refill: Capillary refill takes 2 to 3 seconds.      Findings: No erythema or rash.   Neurological:      Mental Status: Mental status is at baseline.      Cranial Nerves: No cranial nerve deficit.      Comments: Intermittent confusion   Psychiatric:      Comments: Intermittent confusion          Results Review:     Results from last 7 days   Lab Units 04/15/21  0612 04/14/21 0213 04/13/21  0523 04/10/21  1539   SODIUM mmol/L 138 136 133* 132*   POTASSIUM mmol/L 4.0 3.8 4.6 3.7   CHLORIDE mmol/L 100 97* 97* 95*   CO2 mmol/L 31.0* 26.0 21.0* 26.0   BUN mg/dL 60* 56* 45* 19   CREATININE mg/dL 1.08 1.13 1.30* 1.09   CALCIUM mg/dL 9.0 8.8 9.6 9.7   BILIRUBIN mg/dL  --   --   --  0.8   ALK PHOS U/L  --   --   --  74   ALT (SGPT) U/L  --   --   --  14   AST (SGOT) U/L  --   --   --  48*   GLUCOSE mg/dL 186* 245* 176* 198*       Estimated Creatinine Clearance: 50.1 mL/min (by C-G formula based on SCr of 1.08 mg/dL).    Results from last 7 days   Lab Units 04/10/21  1539   MAGNESIUM mg/dL 1.2*             Results from last 7 days   Lab Units 04/15/21  0612 04/14/21 0213 04/13/21  0655 04/12/21  0606 04/11/21  0555   WBC 10*3/mm3 11.17* 10.24 11.71* 13.22* 11.01*   HEMOGLOBIN g/dL 9.2* 9.6* 9.4* 9.6* 9.3*   PLATELETS 10*3/mm3 194 182 144 139* 118*       Results from last 7 days   Lab Units 04/10/21  1539   INR  0.98     Lab Results   Component Value Date    PROBNP 14,408.0 (H) 04/10/2021            I/O last 3 completed shifts:  In: 320 [P.O.:120; IV Piggyback:200]  Out: 1855 [Urine:1855]    Cardiographics:  ECG/EMG Results (last 24 hours)       Procedure Component Value Units Date/Time    Adult Transthoracic Echo Complete W/ Cont if Necessary Per Protocol [[id number]] Collected: 04/11/21 1447     Updated: 04/11/21 1808     BSA 1.8 m^2      RVIDd 2.7 cm      IVSd 1.1 cm      LVIDd 5.5 cm      LVIDs 4.9 cm      LVPWd 0.88 cm      IVS/LVPW 1.2     FS 11.1 %      EDV(Teich) 147.4 ml      ESV(Teich) 112.3 ml      EF(Teich) 23.8 %      EDV(cubed) 166.4 ml      ESV(cubed) 116.9 ml      EF(cubed) 29.7 %      LV mass(C)d 208.3 grams      LV mass(C)dI 115.7 grams/m^2      SV(Teich) 35.1 ml      SI(Teich) 19.5 ml/m^2      SV(cubed) 49.4 ml      SI(cubed) 27.5 ml/m^2      Ao root diam 3.5 cm      Ao root area 9.6 cm^2      ACS 1.8 cm      LA dimension 4.0 cm      asc Aorta Diam 3.2 cm      LA/Ao 1.1     LVOT diam 1.9 cm      LVOT area 2.8 cm^2      LVOT area(traced) 2.8 cm^2      LVLd ap4 8.9 cm      EDV(MOD-sp4) 136.0 ml      LVLs ap4 9.0 cm      ESV(MOD-sp4) 95.9 ml      EF(MOD-sp4) 29.5 %      LVLd ap2 9.3 cm      EDV(MOD-sp2) 145.0 ml      LVLs ap2 9.0 cm      ESV(MOD-sp2) 130.0 ml      EF(MOD-sp2) 10.3 %      SV(MOD-sp4) 40.1 ml      SI(MOD-sp4) 22.3 ml/m^2      SV(MOD-sp2) 15.0 ml      SI(MOD-sp2) 8.3 ml/m^2      Ao root area (BSA corrected) 1.9     LV Tim Vol (BSA corrected) 75.6 ml/m^2      LV Sys Vol (BSA corrected) 53.3 ml/m^2      MV E max leonarda 90.0 cm/sec      MV A max leonarda 112.0 cm/sec      MV E/A 0.8     MV V2 max 120.0 cm/sec      MV max PG 5.8 mmHg      MV V2 mean 91.5 cm/sec      MV mean PG 4.0 mmHg      MV V2 VTI 17.0 cm      MVA(VTI) 1.8 cm^2      MV P1/2t max leonarda 104.0 cm/sec      MV P1/2t 42.7 msec      MVA(P1/2t) 5.2 cm^2      MV dec slope 714.0 cm/sec^2      Ao pk leonarda 128.0 cm/sec      Ao max PG 6.6 mmHg      Ao max PG (full) 2.6 mmHg      Ao V2 mean 90.1 cm/sec      Ao mean PG 4.0 mmHg      Ao  mean PG (full) 2.0 mmHg      Ao V2 VTI 16.8 cm      MARKIE(I,A) 1.8 cm^2      MARKIE(I,D) 1.8 cm^2      MARKIE(V,A) 2.2 cm^2      MARKIE(V,D) 2.2 cm^2      LV V1 max PG 4.0 mmHg      LV V1 mean PG 2.0 mmHg      LV V1 max 99.9 cm/sec      LV V1 mean 60.7 cm/sec      LV V1 VTI 10.9 cm      MR max leonarda 385.0 cm/sec      MR max PG 59.3 mmHg      SV(Ao) 161.6 ml      SI(Ao) 89.8 ml/m^2      SV(LVOT) 30.9 ml      SI(LVOT) 17.2 ml/m^2      PA V2 max 104.0 cm/sec      PA max PG 4.3 mmHg      TR max leonarda 366.0 cm/sec      RVSP(TR) 63.6 mmHg      RAP systole 10.0 mmHg      MVA P1/2T LCG 2.1 cm^2       CV ECHO CRISTIANA - BZI_BMI 23.8 kilograms/m^2       CV ECHO CRISTIANA - BSA(HAYCOCK) 1.8 m^2       CV ECHO CRISTIANA - BZI_METRIC_WEIGHT 68.9 kg       CV ECHO CRISTIANA - BZI_METRIC_HEIGHT 170.2 cm      Target HR (85%) 116 bpm      Max. Pred. HR (100%) 137 bpm      Echo EF Estimated 25 %     Narrative:      · Estimated left ventricular EF = 25% Left ventricular ejection fraction   appears to be 21 - 25%. Left ventricular systolic function is moderately   decreased. The left ventricular cavity is mildly dilated. Mid/ Distal   Septum, Harmony, Anterior wall severely hypokinetic/ dyskinetic Left   ventricular diastolic function is consistent with (grade Ia w/high LAP)   impaired relaxation.  · Mild to moderate mitral valve regurgitation is present.  · Estimated right ventricular systolic pressure from tricuspid   regurgitation is markedly elevated (>55 mmHg).             Results for orders placed during the hospital encounter of 04/10/21    Adult Transthoracic Echo Complete W/ Cont if Necessary Per Protocol    Interpretation Summary  · Estimated left ventricular EF = 25% Left ventricular ejection fraction appears to be 21 - 25%. Left ventricular systolic function is moderately decreased. The left ventricular cavity is mildly dilated. Mid/ Distal Septum, Harmony, Anterior wall severely hypokinetic/ dyskinetic Left ventricular diastolic function is consistent with  (grade Ia w/high LAP) impaired relaxation.  · Mild to moderate mitral valve regurgitation is present.  · Estimated right ventricular systolic pressure from tricuspid regurgitation is markedly elevated (>55 mmHg).      Imaging Results (Most Recent)       Procedure Component Value Units Date/Time    XR Chest 1 View [[id number]] Collected: 04/10/21 1605     Updated: 04/10/21 1634    Narrative:      PROCEDURE: XR CHEST 1 VW    VIEWS:Single    INDICATION: Shortness of breath    COMPARISON: CXR: 1/5/2018    FINDINGS:       - lines/tubes: None    - cardiac: Size within normal limits.    - mediastinum: Contour within normal limits.     - lungs: No evidence of a focal air space process. Interstitial  and airspace opacities bilaterally, may represent edema or  pneumonia  - pleura: No evidence of  fluid.      - osseous: Unremarkable for age.      Impression:      Interstitial opacities with a few scattered areas of airspace  opacification, may represent edema or pneumonia.      Electronically signed by:  Kiki Benton MD  4/10/2021 4:33 PM CDT  Workstation: [id number]            XR Chest 1 View    Result Date: 4/10/2021  Interstitial opacities with a few scattered areas of airspace opacification, may represent edema or pneumonia. Electronically signed by:  Kiki Benton MD  4/10/2021 4:33 PM CDT Workstation: [id number]      Medication Review:     Current Facility-Administered Medications:     acetaminophen (TYLENOL) tablet 650 mg, 650 mg, Oral, Q4H PRN, Juli Damon MD    aspirin EC tablet 81 mg, 81 mg, Oral, Daily, Juli Damon MD, 81 mg at 04/15/21 0855    atropine sulfate injection 0.5 mg, 0.5 mg, Intravenous, Q5 Min PRN, Juli Damon MD    calcium carbonate (TUMS) chewable tablet 500 mg (200 mg elemental), 2 tablet, Oral, Q4H PRN, Juli Damon MD, 2 tablet at 04/14/21 1553    carvedilol (COREG) tablet 25 mg, 25 mg, Oral, BID With Meals, Juli Damon MD,  25 mg at 04/15/21 0855    cefTRIAXone (ROCEPHIN) 1 g/100 mL 0.9% NS (MBP), 1 g, Intravenous, Q24H, Juli Damon MD, Last Rate: 0 mL/hr at 04/12/21 0222, 1 g at 04/14/21 2028    dextrose (D50W) 25 g/ 50mL Intravenous Solution 25 g, 25 g, Intravenous, Q15 Min PRN, Juli Damon MD    dextrose (GLUTOSE) oral gel 15 g, 15 g, Oral, Q15 Min PRN, Juli Damon MD    digoxin (LANOXIN) tablet 125 mcg, 125 mcg, Oral, Daily, Juli Damon MD, 125 mcg at 04/14/21 1131    finasteride (PROSCAR) tablet 5 mg, 5 mg, Oral, Daily, Juli Damon MD, 5 mg at 04/15/21 0855    furosemide (LASIX) tablet 20 mg, 20 mg, Oral, BID, Sera Ferguson APRN, 20 mg at 04/15/21 0855    glucagon (human recombinant) (GLUCAGEN DIAGNOSTIC) injection 1 mg, 1 mg, Subcutaneous, Q15 Min PRN, Juli Damon MD    heparin (porcine) 5000 UNIT/ML injection 5,000 Units, 5,000 Units, Subcutaneous, Q8H, Javier Evans MD, 5,000 Units at 04/15/21 0618    HYDROcodone-acetaminophen (NORCO)  MG per tablet 1 tablet, 1 tablet, Oral, Q6H PRN, Juli Damon MD, 1 tablet at 04/15/21 0444    insulin aspart (novoLOG) injection 0-7 Units, 0-7 Units, Subcutaneous, TID AC, Juli Damon MD, 3 Units at 04/14/21 1714    ipratropium-albuterol (DUO-NEB) nebulizer solution 3 mL, 3 mL, Nebulization, Q4H PRN, Juli Damon MD, 3 mL at 04/15/21 0951    losartan (COZAAR) tablet 25 mg, 25 mg, Oral, Q24H, Juli Damon MD, 25 mg at 04/15/21 0855    morphine injection 2 mg, 2 mg, Intravenous, Q2H PRN, 2 mg at 04/15/21 0856 **AND** naloxone (NARCAN) injection 0.4 mg, 0.4 mg, Intravenous, Q5 Min PRN, Juli Damon MD    nitroglycerin (NITROSTAT) SL tablet 0.4 mg, 0.4 mg, Sublingual, Q5 Min PRN, Juli Damon MD, 0.4 mg at 04/15/21 0029    prochlorperazine (COMPAZINE) injection 5 mg, 5 mg, Intravenous, Q6H PRN, Nelson  Juli Luciano MD, 5 mg at 04/15/21 0346    ranolazine (RANEXA) 12 hr tablet 500 mg, 500 mg, Oral, Q12H, Juli Damon MD, 500 mg at 04/15/21 0855    rosuvastatin (CRESTOR) tablet 10 mg, 10 mg, Oral, Nightly, Juli Damon MD, 10 mg at 04/14/21 2017    sodium chloride 0.9 % flush 10 mL, 10 mL, Intravenous, PRN, Juli Damon MD    sodium chloride 0.9 % flush 10 mL, 10 mL, Intravenous, Q12H, Juli Damon MD, 10 mL at 04/15/21 0854    sodium chloride 0.9 % flush 10 mL, 10 mL, Intravenous, PRN, Juli Damon MD    sodium chloride 0.9 % infusion 250 mL, 250 mL, Intravenous, Once PRN, Juli Damon MD    tamsulosin (FLOMAX) 24 hr capsule 0.4 mg, 0.4 mg, Oral, Nightly, Juli Damon MD, 0.4 mg at 04/14/21 2017    Assessment/Plan       NSTEMI (non-ST elevated myocardial infarction) (CMS/HCC)    Type 2 diabetes mellitus without complication, without long-term current use of insulin (CMS/[facility])    Hypertension    Pneumonia due to infectious organism    NSTEMI: Mr. Eze Downing is a 85-year-old  male with past medical history of type 2 diabetes, hypertension, degenerative disc disease, chronic pain, BPH who presented to the ER with shortness of breath and intermittent chest pain.  EKG was performed showing sinus tachycardia with right bundle branch block, rightward axis, diffuse ST-T changes with nonspecific findings.  Troponin levels have been elevated at 0.722 on admission and and peaked at 1.930 consistent with non-ST segment elevation myocardial infarction. Lengthy/detailed discussion of options with wife.  Options were discussed with wife and she opted for invasive workup with [facility] per Dr. Damon. LHC showing severe MVD, extensive left to left and left to right collateral. During the procedure, patient developed severe symptomatic bradycardia and asystole requiring  resuscitation as per ACLS protocol for less than 2  minutes and placement of a temporary transvenous pacemaker. Afterwards, temporary pacemaker was removed. He remains hemodynamically stable and responding appropriately this morning. He is oxygenating well on 2L NC.     -CTVS was consulted for severe MVD given he is not a candidate for PCI. CTVS consult concluded poor targets for bypass, severe physical deconditioning. Patient would be high risk and medical management was recommended.     -Continue aspirin 81 mg daily.  -Continue lipid lowering therapy with rosuvastatin 10mg   -Continue Coreg 25 mg twice daily  -Continue Ranexa 500mg BID      first presentation of severe systolic heart failure. Etiology: likely ICM. LVEF 25%.  NYHA stage C. FC-IV.   Patient is currently with improving volume status and with adequate perfusion. The patient's hemodynamics are improving.  -BB: Coreg 25mg BID  -ACE/ARB/ENTRESTO: losartan 25mg   - DIGOXIN: Continue 125mcg  - IMDUR/HYDRALAZINE: n/a  -DIURETIC:  lasix 20mg po BID  -MRA: The patient is FC-NYHA Class IV and MRA is indicated.   - 6MWT: Will complete as outpatient  -Fluid restriction: 1500 mL a day  -Sodium restriction: 2000 mg day   -ICD:  Indicated.   QRS: 130ms    Bilateral pneumonia: Continue IV antibiotics per primary.  DuoNebs.    Delirium: Likely in the setting of dementia.  Improved today.    Diabetes mellitus type 2: Continue sliding scale insulin    Acute respiratory failure with hypoxia: Currently stable on 2 L.  Wean as condition will allow.    Plan for disposition: Continue 3w. Possibly d/c tomorrow.               This document has been electronically signed by MART Zamorano on April 15, 2021 10:03 CDT     Electronically signed by MART Zamorano, 04/15/21, 10:05 AM CDT.    Mr. Eze Downing is a 85-year-old  male with history of type 2 diabetes, hypertension, degenerative disc disease, chronic pain, mild dementia, BPH who presented with > 1 week history of dyspnea on exertion and  intermittent chest pain.  EKG showed right bundle branch block and diffuse nonspecific ST-T changes.  Troponins were elevated and peaked at 1.93 suggesting a non-ST segment elevation myocardial infarction.  Echocardiogram showed LV dysfunction with an ejection fraction of 20 to 25% with hypokinesis of the septum, apex, anterio lateral wall and pulmonary hypertension.  Definitive evaluation by cardiac catheterization was recommended.  All risks and benefits were explained and the patient and family were aware of increased risks in view of multiple comorbidities.     Cardiac catheterization showed:  Impression: Severe multivessel coronary artery disease with critical lesions noted in the left anterior descending coronary artery, diagonal coronary artery, ramus intermedius coronary artery, circumflex coronary artery, obtuse marginal coronary artery and right coronary artery as described above.  Extensive left to left and left to right collaterals.  Patient is known to have LV dysfunction with an ejection fraction of 20 to 25% by echocardiogram  The patient developed severe symptomatic bradycardia and asystole requiring resuscitation as per ACLS protocol for less than 2 minutes and placement of a temporary transvenous pacemaker.  The patient became hemodynamically stable and remained awake and responding appropriately to commands post resuscitation, maintaining his oxygenation.    Patient did have some degree of chest pain overnight and most likely due to multivessel coronary artery disease and CPR causing some degree of musculoskeletal pain.  Responded to morphine/hydrocodone    On examination: Heart rate 80 bpm regular blood pressure 120/84 mmHg  No pallor icterus or cyanosis  Exam the heart showed normal first and second heart sounds with no S3, S4, pericardial rub or murmur  Lung exam showed normal breath sounds with no rales or rhonchi.  Abdomen was soft with no mass or tenderness  Extremities showed no  edema  Patient does have intermittent confusion but responds appropriately to questions    Labs reviewed.  Creatinine improved but BUN 60.  Hemoglobin 9.2.    Continue present maximal medical management since he is not a candidate for intervention or CABG as per CT surgery in view of his multiple comorbidity and poor targets.  Continue antiplatelet therapy with aspirin, management of LV dysfunction with carvedilol, losartan, Lasix, digoxin.  Continue antianginal therapy with Ranexa.

## 2021-04-15 NOTE — PLAN OF CARE
Goal Outcome Evaluation:  Plan of Care Reviewed With: patient, spouse     Outcome Summary: OT eval on this date.  Pt required min A for bed mobility.  He needed min A for sit to stand to sit and mod A for 2-3 steps to head of bed.  Pt was min A to don sock while sitting EOB.  He could benefit from OT services to regain lost function for ADLs and functional mob.  Rec home with cont OT/PT when D/C from hospital.

## 2021-04-15 NOTE — PROGRESS NOTES
Discharge Planning Assessment  [facility]     Patient Name: Eze Downing  MRN: [id number]  Today's Date: 4/15/2021    Admit Date: 4/10/2021    Discharge Needs Assessment     Row Name 04/15/21 1404       Living Environment    Lives With  spouse    Name(s) of Who Lives With Patient  Odalis Downing    Current Living Arrangements  home/apartment/condo    Primary Care Provided by  spouse/significant other    Family Caregiver if Needed  spouse    Family Caregiver Names  Odalis Downing    Quality of Family Relationships  helpful;involved;supportive    Able to Return to Prior Arrangements  yes    Living Arrangement Comments  Patients daughters assist with care also.       Resource/Environmental Concerns    Resource/Environmental Concerns  none    Transportation Concerns  car, none       Transition Planning    Patient/Family Anticipates Transition to  home with family;home with help/services    Patient/Family Anticipated Services at Transition  home health care    Transportation Anticipated  family or friend will provide       Discharge Needs Assessment    Readmission Within the Last 30 Days  no previous admission in last 30 days    Current Outpatient/Agency/Support Group  homecare agency    Equipment Currently Used at Home  cane, straight;glucometer;shower chair;walker, rolling;other (see comments) Shirlene Mobility Scooter. DME from [facility], but [facility].    Concerns to be Addressed  discharge planning    Anticipated Changes Related to Illness  none    Equipment Needed After Discharge  oxygen    Outpatient/Agency/Support Group Needs  homecare agency    Discharge Facility/Level of Care Needs  home with home health    Provided Post Acute Provider List?  Yes    Post Acute Provider List  DME Supplier;Home Health    Provided Post Acute Provider Quality & Resource List?  Yes    Post Acute Provider Quality and Resource List  Home Health    Delivered To  Support Person    Support Person  Odalis Chang  spouse    Method of Delivery  In person    Patient's Choice of Community Agency(s)  [facility] and [facility] for DME if oxygen needed.    Current Discharge Risk  chronically ill;dependent with mobility/activities of daily living        Discharge Plan     Row Name 04/15/21 1415       Plan    Plan  Home with [facility]    Plan Comments  CM verified pcp, demographics, and pharmacy. Patient is using meds to beds but then will use [facility]. Patient with rx coverage with affordable copays. Spouse has been caring for patient at home and is agreeable to home health services. She states they have 4 daughters that assist with patients care. If oxygen needed, wants [facility] and the small portable concentrator. Not agreeable to SNF services but is agreeable to  services with [facility].   KASH Daniels    Row Name 04/15/21 1140       Plan    Plan Comments  Continue stay review - Patient with severe CAD and not a candidate for surgery. Patient to be treated medically, started on ranexa. Porter cath placed this am for urinary retention. Possible d/c tomorrow per Dr. Jacobo note.  KASH Daniels        Continued Care and Services - Admitted Since 4/10/2021    Coordination has not been started for this encounter.       Expected Discharge Date and Time     Expected Discharge Date Expected Discharge Time    Apr 16, 2021         Demographic Summary     Row Name 04/15/21 1358       General Information    Admission Type  inpatient    Arrived From  home    Required Notices Provided  Important Message from Medicare    Referral Source  high risk screening Lace of 11 at time of assessment.    Preferred Language  English    General Information Comments  Patient confused, assessment done with spouse.        Functional Status     Row Name 04/15/21 1402       Functional Status    Usual Activity Tolerance  fair    Current Activity Tolerance  fair    Functional Status Comments  Patient uses cane or rolling walker  to assist with ambulation.       Functional Status, IADL    Medications  completely dependent    Meal Preparation  completely dependent    Housekeeping  completely dependent    Laundry  completely dependent    Shopping  completely dependent    IADL Comments  Patient with dementia, completely dependent with IADL's and assistance with ADL's.       Mental Status    General Appearance WDL  WDL       Mental Status Summary    Mental Status Comments  Patient with dementia.       Employment/    Employment Status  retired        Psychosocial    No documentation.       Abuse/Neglect    No documentation.       Legal    No documentation.       Substance Abuse    No documentation.       Patient Forms    No documentation.           Shira Pagan RN

## 2021-04-15 NOTE — NURSING NOTE
I paged Dr. Shannon to inform him of patient has not voided since 1400 today.   I bladder scanned the patient at 2005 and resulted 199.   I just (@2350) bladder scanned the patient again and it resulted 491.    I was given the order to place a mckeon per MD for urinary retention.

## 2021-04-16 NOTE — PLAN OF CARE
Goal Outcome Evaluation:  Plan of Care Reviewed With: patient, spouse     Outcome Summary: OT tx this date. Pt alert and engaged with frequent redirection and encouragement. Pt fatigued quickly. APRN took off O2 and placed pt on room air. When checked the lowest reading 89 but at times took extended time to read due to digits cold. Pt averaged CGA to min assist for sup to sit to sup with HOB up and handrail. Pt min assist of 1-2 for sit to stand and mod to max assist of 2 for mobility from EOB around bed to chair. Pt had poor balance and stepping pattern and sequence, switched from using rollator to cane but struggled with proper use. RN aware of session. Pt may benefit from further skilled OT to reach PLOF/max independence with ADL.

## 2021-04-16 NOTE — CONSULTS
Adult Nutrition  Assessment    Patient Name:  Eze Downing  YOB: 1935  MRN: [id number]  Admit Date:  4/10/2021    Assessment Date:  4/16/2021    Comments:  Pt admitted due to NSTEMI.  Dx include Type 2 DM. Heart Failure, HTN.  Pt reports poor appetite.  Indicates wt will fluctuate 10-15 lb related to fluid, poor appetite/intake.  Meds include Lasix.  Intake 25% -  4x.  Pt willing to receive supplement with meals.  Blood glucose, HgbA1C are elevated.  Sliding scale novolog prescribed.  Meds include solu-medrol.  Reports having nausea.  PRN Compazine prescribed.  Pt not appropriate for Low Sodium Diet ed due to advanced age, decreased appetite/intake.  Will maintain pt on prescribed diet and add Boost Glucose Control to meals.     Reason for Assessment     Row Name 04/16/21 1209          Reason for Assessment    Reason For Assessment  per organizational policy Length of Stay, Low Sodium Diet ed     Diagnosis  cardiac disease dx Heart Failure     Identified At Risk by Screening Criteria  need for education             Labs/Tests/Procedures/Meds     Row Name 04/16/21 1211          Labs/Procedures/Meds    Lab Results Reviewed  reviewed, pertinent        Medications    Pertinent Medications Reviewed  reviewed, pertinent           Estimated/Assessed Needs     Row Name 04/16/21 1211          Calculation Measurements    Weight Used For Calculations  68.3 kg (150 lb 9.2 oz)        Estimated/Assessed Needs    Additional Documentation  Calorie Requirements (Group);Fluid Requirements (Group);Howard-St. Jeor Equation (Group);Protein Requirements (Group)        Calorie Requirements    Estimated Calorie Requirement (kcal/day)  1737     Estimated Calorie Need Method  Howard-St Jeor        Howard-St. Jeor Equation    RMR (Howard-St. Jeor Equation)  1336.752        Protein Requirements    Weight Used For Protein Calculations  68.3 kg (150 lb 9.2 oz)     Est Protein Requirement Amount (gms/kg)  1.2 gm  protein     Estimated Protein Requirements (gms/day)  81.96        Fluid Requirements    Fluid Requirements (mL/day)  1707     RDA Method (mL)  1707         Nutrition Prescription Ordered     Row Name 04/16/21 1214          Nutrition Prescription PO    Current PO Diet  Regular     Common Modifiers  Cardiac;Consistent Carbohydrate         Evaluation of Received Nutrient/Fluid Intake     Row Name 04/16/21 1214          PO Evaluation    Number of Meals  4     % PO Intake  25% - 4x               Electronically signed by:  Elizabeth Jimenez RD  04/16/21 12:15 CDT

## 2021-04-16 NOTE — THERAPY TREATMENT NOTE
Patient Name: Eze Downing  : 1935    MRN: [id number]                              Today's Date: 2021       Admit Date: 4/10/2021    Visit Dx:     ICD-10-CM ICD-9-CM   1. Pneumonia due to infectious organism, unspecified laterality, unspecified part of lung  J18.9 486   2. NSTEMI (non-ST elevated myocardial infarction) (CMS/HCC)  I21.4 410.70   3. Oral phase dysphagia  R13.11 787.21   4. Impaired mobility and activities of daily living  Z74.09 V49.89    Z78.9      Patient Active Problem List   Diagnosis   • Non-recurrent unilateral inguinal hernia without obstruction or gangrene   • Retention of urine   • Enlarged prostate with urinary obstruction   • BPH (benign prostatic hyperplasia)   • Type 2 diabetes mellitus without complication, without long-term current use of insulin (CMS/[facility])   • Multiple joint pain   • Hypertension   • Gastroesophageal reflux disease   • Chronic obstructive pulmonary disease (CMS/[facility])   • Chronic low back pain without sciatica   • At high risk for falls   • Pneumonia due to infectious organism   • NSTEMI (non-ST elevated myocardial infarction) (CMS/[facility])   • Acute systolic congestive heart failure (CMS/[facility])     Past Medical History:   Diagnosis Date   • Arthritis    • Broken ribs 2018   • Diabetes mellitus (CMS/[facility])    • Hypertension    • Inguinal hernia      Past Surgical History:   Procedure Laterality Date   • APPENDECTOMY     • CARDIAC CATHETERIZATION N/A 2021    Procedure: Left Heart Cath;  Surgeon: Juli Damon MD;  Location: [facility] CATH INVASIVE LOCATION;  Service: Cardiovascular;  Laterality: N/A;   • CYSTOSCOPY N/A 2018    Procedure: CYSTOSCOPY;  Surgeon: Michael Forbes MD;  Location: [facility] OR;  Service: Urology   • CYSTOSCOPY TRANSURETHRAL RESECTION OF PROSTATE N/A 2018    Procedure: CYSTOSCOPY TRANSURETHRAL RESECTION OF PROSTATE; PLACEMENT SUPRAPUBIC CATHETER;  Surgeon: Michael Forbes MD;  Location: [facility];   Service: Urology   • INGUINAL HERNIA REPAIR Left 4/19/2018    Procedure: OPEN REPAIR OF A LARGE INGUINAL HERNIA;  Surgeon: Ramin Collado MD;  Location: [facility] OR;  Service: General     General Information     Row Name 04/16/21 0851          OT Time and Intention    Document Type  therapy note (daily note)  -     Mode of Treatment  individual therapy;occupational therapy  -     Row Name 04/16/21 0851          General Information    Patient Profile Reviewed  yes  -     Existing Precautions/Restrictions  fall;cardiac  -     Barriers to Rehab  medically complex;previous functional deficit  -     Row Name 04/16/21 0851          Cognition    Orientation Status (Cognition)  oriented to;person;place;situation;disoriented to;time  -     Row Name 04/16/21 0851          Safety Issues, Functional Mobility    Safety Issues Affecting Function (Mobility)  safety precautions follow-through/compliance;insight into deficits/self-awareness;ability to follow commands;at risk behavior observed;judgment;positioning of assistive device;sequencing abilities;problem-solving;safety precaution awareness;impulsivity;awareness of need for assistance  -     Impairments Affecting Function (Mobility)  balance;coordination;endurance/activity tolerance;pain;shortness of breath;strength;cognition;motor control;motor planning;postural/trunk control  -     Cognitive Impairments, Mobility Safety/Performance  attention;awareness, need for assistance;impulsivity;insight into deficits/self-awareness;judgment;problem-solving/reasoning;sequencing abilities;safety precaution follow-through;safety precaution awareness  -     Comment, Safety Issues/Impairments (Mobility)  redirection at times and encouragement  -       User Key  (r) = Recorded By, (t) = Taken By, (c) = Cosigned By    Initials Name Provider Type     Ludy Wyatt, OTR/L Occupational Therapist          Mobility/ADL's     Row Name 04/16/21 0851          Bed Mobility    Bed  Mobility  supine-sit;sit-supine  -     Supine-Sit [place] (Bed Mobility)  contact guard;nonverbal cues (demo/gesture);verbal cues  -     Sit-Supine [place] (Bed Mobility)  minimum assist (75% patient effort);verbal cues;nonverbal cues (demo/gesture)  -     Bed Mobility, Safety Issues  decreased use of arms for pushing/pulling;decreased use of legs for bridging/pushing  -     Assistive Device (Bed Mobility)  bed rails;head of bed elevated  -     Comment (Bed Mobility)  pt fatigued quickly increased assist with fatigue, pt required more assist for sit to sup to get trunk and legs safely on the bed, pt did better getting to the EOB with the HOB up and handrails  -     Row Name 04/16/21 0851          Transfers    Transfers  sit-stand transfer;bed-chair transfer  -     Comment (Transfers)  vc for hand placement  -     Bed-Chair [place] (Transfers)  moderate assist (50% patient effort);maximum assist (25% patient effort);2 person assist  -     Assistive Device (Bed-Chair Transfers)  cane, straight;walker, 4-wheeled  -     Sit-Stand [place] (Transfers)  minimum assist (75% patient effort);2 person assist;1 person assist;nonverbal cues (demo/gesture);verbal cues  -     Row Name 04/16/21 0851          Sit-Stand Transfer    Assistive Device (Sit-Stand Transfers)  walker, 4-wheeled  -     Row Name 04/16/21 0851          Functional Mobility    Functional Mobility- Ind. Level  moderate assist (50% patient effort);maximum assist (25% patient effort);2 person assist required;verbal cues required;nonverbal cues required (demo/gesture)  -     Functional Mobility- Device  rollator;straight cane  -     Functional Mobility- Comment  pt went around the room approx 15 feet, fatigued with the task, decreased stepping pattern and alignment of legs. Pt required frequent redirection and encouragement. Pt leans backwards and struggled with balance and standing upright.  -     Row Name 04/16/21  0851          Activities of Daily Living    BADL Assessment/Intervention  bathing;upper body dressing;lower body dressing;grooming  -[facility] Name 04/16/21 0851          Bathing Assessment/Intervention    Comment (Bathing)  pt engaged in sponge bath on EOB, pt dependent with back SBA and redirection for UB, min assist with LB, dependent with pericare and safety due to catheter, pt required rest breaks and frequent redirection  -     Row Name 04/16/21 0851          Upper Body Dressing Assessment/Training    Day Level (Upper Body Dressing)  doff;don;moderate assist (50% patient effort)  -     Comment (Upper Body Dressing)  hospital gown  -     Row Name 04/16/21 0851          Lower Body Dressing Assessment/Training    Comment (Lower Body Dressing)  pt struggled to safely reach his feet min assist to doff and averaged mod assist to don  -[facility] Name 04/16/21 0851          Grooming Assessment/Training    Position (Grooming)  edge of bed sitting  -     Comment (Grooming)  pt SBA and setup with applying deodorant with redirection  -       User Key  (r) = Recorded By, (t) = Taken By, (c) = Cosigned By    Initials Name Provider Type     Ludy Wyatt, OTR/L Occupational Therapist        Obj/Interventions     [facility] Name 04/16/21 0851          Balance    Balance Assessment  sitting static balance;sitting dynamic balance;standing static balance;standing dynamic balance  -     Static Sitting Balance  mild impairment  -     Dynamic Sitting Balance  moderate impairment  -     Static Standing Balance  mild impairment;moderate impairment  -     Dynamic Standing Balance  severe impairment  -       User Key  (r) = Recorded By, (t) = Taken By, (c) = Cosigned By    Initials Name Provider Type     Ludy Wyatt, OTR/L Occupational Therapist        Goals/Plan     [facility] Name 04/16/21 0851          Transfer Goal 1 (OT)    Activity/Assistive Device (Transfer Goal 1, OT)  transfers, all  -      [place] Level/Cues Needed (Transfer Goal 1, OT)  contact guard assist  -BH     Time Frame (Transfer Goal 1, OT)  long term goal (LTG)  -BH     Progress/Outcome (Transfer Goal 1, OT)  goal not met  -     Row Name 04/16/21 0851          Bathing Goal 1 (OT)    Activity/Device (Bathing Goal 1, OT)  bathing skills, all  -     [place] Level/Cues Needed (Bathing Goal 1, OT)  contact guard assist  -BH     Time Frame (Bathing Goal 1, OT)  long term goal (LTG)  -BH     Progress/Outcomes (Bathing Goal 1, OT)  goal not met  -[facility] Name 04/16/21 0851          Dressing Goal 1 (OT)    Activity/Device (Dressing Goal 1, OT)  dressing skills, all  -     [place]/Cues Needed (Dressing Goal 1, OT)  contact guard assist  -BH     Time Frame (Dressing Goal 1, OT)  long term goal (LTG)  -BH     Progress/Outcome (Dressing Goal 1, OT)  goal not met  -[facility] Name 04/16/21 0851          Toileting Goal 1 (OT)    Activity/Device (Toileting Goal 1, OT)  toileting skills, all  -     [place] Level/Cues Needed (Toileting Goal 1, OT)  contact guard assist  -BH     Time Frame (Toileting Goal 1, OT)  long term goal (LTG)  -BH     Progress/Outcome (Toileting Goal 1, OT)  goal not met  -BH     Row Name 04/16/21 0851           Activity Tolerance Goal 1 (OT)    Activity Tolerance Goal 1 (OT)  20' ADL therapeutic act with 1-2 rest breaks.  -     Activity Level (Endurance Goal 1, OT)  15 min activity;O2 sat >/ equal to 88%  -     Progress/Outcome (Activity Tolerance Goal 1, OT)  goal partially met  -       User Key  (r) = Recorded By, (t) = Taken By, (c) = Cosigned By    Initials Name Provider Type     Ludy Wyatt, OTR/L Occupational Therapist        Clinical Impression     Row Name 04/16/21 0851          Pain Scale: Numbers Pre/Post-Treatment    Pretreatment Pain Rating  0/10 - no pain  -     Posttreatment Pain Rating  0/10 - no pain  -     Pre/Posttreatment Pain Comment  pt during the session told MART  pain in chest and back  -     Pain Intervention(s)  Ambulation/increased activity;Distraction;Rest;Repositioned  -     Row Name 04/16/21 0851          Plan of Care Review    Plan of Care Reviewed With  patient;spouse  -     Outcome Summary  OT tx this date. Pt alert and engaged with frequent redirection and encouragement. Pt fatigued quickly. APRN took off O2 and placed pt on room air. When checked the lowest reading 89 but at times took extended time to read due to digits cold. Pt averaged CGA to min assist for sup to sit to sup with HOB up and handrail. Pt min assist of 1-2 for sit to stand and mod to max assist of 2 for mobility from EOB around bed to chair. Pt had poor balance and stepping pattern and sequence, switched from using rollator to cane but struggled with proper use. RN aware of session. Pt may benefit from further skilled OT to reach PLOF/max independence with ADL.  -     Row Name 04/16/21 0851          Vital Signs    Pretreatment Heart Rate (beats/min)  105  -     Posttreatment Heart Rate (beats/min)  101  -     Pre SpO2 (%)  92  -     O2 Delivery Pre Treatment  room air  -     Intra SpO2 (%)  89  -     O2 Delivery Intra Treatment  room air  -     Post SpO2 (%)  91  -     O2 Delivery Post Treatment  room air  -     Pre Patient Position  Supine  -     Post Patient Position  Sitting  -     Row Name 04/16/21 0851          Positioning and Restraints    Pre-Treatment Position  in bed  -     Post Treatment Position  chair  -     In Chair  notified nsg;reclined;sitting;call light within reach;encouraged to call for assist;with family/caregiver  [facility]       User Key  (r) = Recorded By, (t) = Taken By, (c) = Cosigned By    Initials Name Provider Type     Ludy Wyatt, OTR/L Occupational Therapist        Outcome Measures     Row Name 04/16/21 0851          How much help from another is currently needed...    Putting on and taking off regular lower body clothing?  2  -      Bathing (including washing, rinsing, and drying)  2  -     Toileting (which includes using toilet bed pan or urinal)  2  -     Putting on and taking off regular upper body clothing  2  -     Taking care of personal grooming (such as brushing teeth)  3  -     Eating meals  3  -     AM-PAC 6 Clicks Score (OT)  14  -     Row Name 04/16/21 0851          Functional Assessment    Outcome Measure Options  AM-PAC 6 Clicks Daily Activity (OT)  -       User Key  (r) = Recorded By, (t) = Taken By, (c) = Cosigned By    Initials Name Provider Type     Ludy Wyatt, OTR/L Occupational Therapist        Occupational Therapy Education                 Title: PT OT SLP Therapies (In Progress)     Topic: Occupational Therapy (In Progress)     Point: ADL training (In Progress)     Description:   Instruct learner(s) on proper safety adaptation and remediation techniques during self care or transfers.   Instruct in proper use of assistive devices.              Learning Progress Summary           Patient Acceptance, E, NR by  at 4/16/2021 1007    Comment: Educated about OT and POC. Educated to call for assist. Educated on safety throughout.   Family Acceptance, E, NR by  at 4/16/2021 1007    Comment: Educated about OT and POC. Educated to call for assist. Educated on safety throughout.                   Point: Home exercise program (Not Started)     Description:   Instruct learner(s) on appropriate technique for monitoring, assisting and/or progressing therapeutic exercises/activities.              Learner Progress:  Not documented in this visit.          Point: Precautions (In Progress)     Description:   Instruct learner(s) on prescribed precautions during self-care and functional transfers.              Learning Progress Summary           Patient Acceptance, E, NR by  at 4/16/2021 1007    Comment: Educated about OT and POC. Educated to call for assist. Educated on safety throughout.    Acceptance, E, VU by  at  4/15/2021 1641    Comment: Edu pt on use of gait belt and non skid socks when OOB and no OOB without assist.   Family Acceptance, E, NR by  at 4/16/2021 1007    Comment: Educated about OT and POC. Educated to call for assist. Educated on safety throughout.                   Point: Body mechanics (In Progress)     Description:   Instruct learner(s) on proper positioning and spine alignment during self-care, functional mobility activities and/or exercises.              Learning Progress Summary           Patient Acceptance, E, NR by  at 4/16/2021 1007    Comment: Educated about OT and POC. Educated to call for assist. Educated on safety throughout.   Family Acceptance, E, NR by  at 4/16/2021 1007    Comment: Educated about OT and POC. Educated to call for assist. Educated on safety throughout.                               User Key     Initials Effective Dates Name Provider Type Discipline     07/24/19 -  Paolo Arriaga OT Occupational Therapist OT     06/08/18 -  Ludy Wyatt OTR/L Occupational Therapist OT              OT Recommendation and Plan     Plan of Care Review  Plan of Care Reviewed With: patient, spouse  Outcome Summary: OT tx this date. Pt alert and engaged with frequent redirection and encouragement. Pt fatigued quickly. APRN took off O2 and placed pt on room air. When checked the lowest reading 89 but at times took extended time to read due to digits cold. Pt averaged CGA to min assist for sup to sit to sup with HOB up and handrail. Pt min assist of 1-2 for sit to stand and mod to max assist of 2 for mobility from EOB around bed to chair. Pt had poor balance and stepping pattern and sequence, switched from using rollator to cane but struggled with proper use. RN aware of session. Pt may benefit from further skilled OT to reach PLOF/max independence with ADL.     Time Calculation:   Time Calculation- OT     Row Name 04/16/21 1007             Time Calculation- OT    OT Start Time  0851  -       OT Stop Time  0949  -      OT Time Calculation (min)  58 min  -      Total Timed Code Minutes- OT  58 minute(s)  -      OT Received On  04/16/21  -        User Key  (r) = Recorded By, (t) = Taken By, (c) = Cosigned By    Initials Name Provider Type     Ludy Wyatt, OTR/L Occupational Therapist        Therapy Charges for Today     Code Description Service Date Service Provider Modifiers Qty    [id number]  OT SELF CARE/MGMT/TRAIN EA 15 MIN 4/16/2021 Ludy Wyatt OTR/L GO 3    [id number]  OT THERAPEUTIC ACT EA 15 MIN 4/16/2021 Ludy Wyatt OTR/L GO 1               JANENE Spears/GIORGIO  4/16/2021

## 2021-04-16 NOTE — PROGRESS NOTES
"[facility] Progress Note   LOS: 4 days   Patient Care Team:  Vielka Ramirez DO as PCP - General    Chief Complaint:    Chief Complaint   Patient presents with    Shortness of Breath    Chest Pain    Heartburn        Subjective     Interval History:   Patient Denies: Edema, dizziness, n/v  Patient Complaints: Intermittent SOA/wheezing, Chest Pain  History taken from: patient chart family     At the time of my exam patient was sitting on side of bed working with OT. Patient does continue to complain of intermittent chest pain.  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  Continue Ranexa for antianginal therapy.  Continue as needed morphine and hydrocodone for pain.  Kidney function and H&H stable.  Hemodynamic stable.    Objective     Vital Sign Min/Max for last 24 hours  Temp  Min: 96.9 °F (36.1 °C)  Max: 97.9 °F (36.6 °C)   BP  Min: 100/56  Max: 121/69   Pulse  Min: 78  Max: 101   Resp  Min: 18  Max: 20   SpO2  Min: 90 %  Max: 97 %   Flow (L/min)  Min: 1  Max: 3   No data recorded     Flowsheet Rows        First Filed Value   Admission Height  170.2 cm (67\") Documented at 04/10/2021 1525   Admission Weight  68 kg (150 lb) Documented at 04/10/2021 1525              04/11/21  0601 04/11/21  1606 04/13/21  0600   Weight: 69.1 kg (152 lb 6.4 oz) 69.1 kg (152 lb 5.4 oz) 68.3 kg (150 lb 8 oz)       Physical Exam:  Physical Exam  Vitals and nursing note reviewed.   Constitutional:       General: He is sleeping. He is not in acute distress.     Appearance: Normal appearance. He is well-developed. He is not diaphoretic.      Interventions: Nasal cannula in place.   HENT:      Head: Normocephalic and atraumatic.      Right Ear: External ear normal.      Left Ear: External ear normal.   Eyes:      General: Lids are normal.      Conjunctiva/sclera: Conjunctivae normal.      Pupils: Pupils are equal, round, and reactive to light.   Neck:      Vascular: No carotid " bruit.   Cardiovascular:      Rate and Rhythm: Normal rate and regular rhythm.      Chest Wall: PMI is not displaced.      Heart sounds: S1 normal and S2 normal. No murmur heard.   No friction rub. No gallop. No S3 sounds.    Pulmonary:      Effort: Pulmonary effort is normal. No respiratory distress.      Breath sounds: No stridor. No wheezing or rales.   Chest:      Chest wall: No tenderness.   Abdominal:      General: Bowel sounds are normal. There is no distension.      Palpations: Abdomen is soft.      Tenderness: There is no abdominal tenderness.   Musculoskeletal:      Cervical back: Normal range of motion and neck supple.      Right lower leg: No edema.      Left lower leg: No edema.   Skin:     General: Skin is warm and dry.      Capillary Refill: Capillary refill takes 2 to 3 seconds.      Findings: No erythema or rash.   Neurological:      Mental Status: Mental status is at baseline.      Cranial Nerves: No cranial nerve deficit.      Comments: Intermittent confusion   Psychiatric:      Comments: Intermittent confusion          Results Review:     Results from last 7 days   Lab Units 04/16/21  0616 04/15/21  0612 04/14/21  0213 04/10/21  1539   SODIUM mmol/L 134* 138 136 132*   POTASSIUM mmol/L 4.2 4.0 3.8 3.7   CHLORIDE mmol/L 94* 100 97* 95*   CO2 mmol/L 30.0* 31.0* 26.0 26.0   BUN mg/dL 61* 60* 56* 19   CREATININE mg/dL 1.25 1.08 1.13 1.09   CALCIUM mg/dL 8.9 9.0 8.8 9.7   BILIRUBIN mg/dL  --   --   --  0.8   ALK PHOS U/L  --   --   --  74   ALT (SGPT) U/L  --   --   --  14   AST (SGOT) U/L  --   --   --  48*   GLUCOSE mg/dL 265* 186* 245* 198*       Estimated Creatinine Clearance: 43.3 mL/min (by C-G formula based on SCr of 1.25 mg/dL).    Results from last 7 days   Lab Units 04/16/21  0616 04/10/21  1539   MAGNESIUM mg/dL 1.9 1.2*             Results from last 7 days   Lab Units 04/16/21  0616 04/15/21  0612 04/14/21 0213 04/13/21  0655 04/12/21  0606   WBC 10*3/mm3 13.63* 11.17* 10.24 11.71* 13.22*    HEMOGLOBIN g/dL 9.5* 9.2* 9.6* 9.4* 9.6*   PLATELETS 10*3/mm3 229 194 182 144 139*       Results from last 7 days   Lab Units 04/10/21  1539   INR  0.98     Lab Results   Component Value Date    PROBNP 14,408.0 (H) 04/10/2021       I/O last 3 completed shifts:  In: 600 [P.O.:600]  Out: 1405 [Urine:1405]    Cardiographics:  ECG/EMG Results (last 24 hours)       Procedure Component Value Units Date/Time    Adult Transthoracic Echo Complete W/ Cont if Necessary Per Protocol [[id number]] Collected: 04/11/21 1447     Updated: 04/11/21 1808     BSA 1.8 m^2      RVIDd 2.7 cm      IVSd 1.1 cm      LVIDd 5.5 cm      LVIDs 4.9 cm      LVPWd 0.88 cm      IVS/LVPW 1.2     FS 11.1 %      EDV(Teich) 147.4 ml      ESV(Teich) 112.3 ml      EF(Teich) 23.8 %      EDV(cubed) 166.4 ml      ESV(cubed) 116.9 ml      EF(cubed) 29.7 %      LV mass(C)d 208.3 grams      LV mass(C)dI 115.7 grams/m^2      SV(Teich) 35.1 ml      SI(Teich) 19.5 ml/m^2      SV(cubed) 49.4 ml      SI(cubed) 27.5 ml/m^2      Ao root diam 3.5 cm      Ao root area 9.6 cm^2      ACS 1.8 cm      LA dimension 4.0 cm      asc Aorta Diam 3.2 cm      LA/Ao 1.1     LVOT diam 1.9 cm      LVOT area 2.8 cm^2      LVOT area(traced) 2.8 cm^2      LVLd ap4 8.9 cm      EDV(MOD-sp4) 136.0 ml      LVLs ap4 9.0 cm      ESV(MOD-sp4) 95.9 ml      EF(MOD-sp4) 29.5 %      LVLd ap2 9.3 cm      EDV(MOD-sp2) 145.0 ml      LVLs ap2 9.0 cm      ESV(MOD-sp2) 130.0 ml      EF(MOD-sp2) 10.3 %      SV(MOD-sp4) 40.1 ml      SI(MOD-sp4) 22.3 ml/m^2      SV(MOD-sp2) 15.0 ml      SI(MOD-sp2) 8.3 ml/m^2      Ao root area (BSA corrected) 1.9     LV Tim Vol (BSA corrected) 75.6 ml/m^2      LV Sys Vol (BSA corrected) 53.3 ml/m^2      MV E max leonarda 90.0 cm/sec      MV A max leonarda 112.0 cm/sec      MV E/A 0.8     MV V2 max 120.0 cm/sec      MV max PG 5.8 mmHg      MV V2 mean 91.5 cm/sec      MV mean PG 4.0 mmHg      MV V2 VTI 17.0 cm      MVA(VTI) 1.8 cm^2      MV P1/2t max leonarda 104.0 cm/sec      MV P1/2t  42.7 msec      MVA(P1/2t) 5.2 cm^2      MV dec slope 714.0 cm/sec^2      Ao pk leonarda 128.0 cm/sec      Ao max PG 6.6 mmHg      Ao max PG (full) 2.6 mmHg      Ao V2 mean 90.1 cm/sec      Ao mean PG 4.0 mmHg      Ao mean PG (full) 2.0 mmHg      Ao V2 VTI 16.8 cm      MARKIE(I,A) 1.8 cm^2      MARKIE(I,D) 1.8 cm^2      MARKIE(V,A) 2.2 cm^2      MARKIE(V,D) 2.2 cm^2      LV V1 max PG 4.0 mmHg      LV V1 mean PG 2.0 mmHg      LV V1 max 99.9 cm/sec      LV V1 mean 60.7 cm/sec      LV V1 VTI 10.9 cm      MR max leonarda 385.0 cm/sec      MR max PG 59.3 mmHg      SV(Ao) 161.6 ml      SI(Ao) 89.8 ml/m^2      SV(LVOT) 30.9 ml      SI(LVOT) 17.2 ml/m^2      PA V2 max 104.0 cm/sec      PA max PG 4.3 mmHg      TR max leonarda 366.0 cm/sec      RVSP(TR) 63.6 mmHg      RAP systole 10.0 mmHg      MVA P1/2T LCG 2.1 cm^2       CV ECHO CRISTIANA - BZI_BMI 23.8 kilograms/m^2       CV ECHO CRISTIANA - BSA(HAYCOCK) 1.8 m^2       CV ECHO CRISTIANA - BZI_METRIC_WEIGHT 68.9 kg       CV ECHO CRISTIANA - BZI_METRIC_HEIGHT 170.2 cm      Target HR (85%) 116 bpm      Max. Pred. HR (100%) 137 bpm      Echo EF Estimated 25 %     Narrative:      · Estimated left ventricular EF = 25% Left ventricular ejection fraction   appears to be 21 - 25%. Left ventricular systolic function is moderately   decreased. The left ventricular cavity is mildly dilated. Mid/ Distal   Septum, [place], Anterior wall severely hypokinetic/ dyskinetic Left   ventricular diastolic function is consistent with (grade Ia w/high LAP)   impaired relaxation.  · Mild to moderate mitral valve regurgitation is present.  · Estimated right ventricular systolic pressure from tricuspid   regurgitation is markedly elevated (>55 mmHg).             Results for orders placed during the hospital encounter of 04/10/21    Adult Transthoracic Echo Complete W/ Cont if Necessary Per Protocol    Interpretation Summary  · Estimated left ventricular EF = 25% Left ventricular ejection fraction appears to be 21 - 25%. Left ventricular  systolic function is moderately decreased. The left ventricular cavity is mildly dilated. Mid/ Distal Septum, [place], Anterior wall severely hypokinetic/ dyskinetic Left ventricular diastolic function is consistent with (grade Ia w/high LAP) impaired relaxation.  · Mild to moderate mitral valve regurgitation is present.  · Estimated right ventricular systolic pressure from tricuspid regurgitation is markedly elevated (>55 mmHg).      Imaging Results (Most Recent)       Procedure Component Value Units Date/Time    XR Chest 1 View [[id number]] Collected: 04/10/21 1605     Updated: 04/10/21 1634    Narrative:      PROCEDURE: XR CHEST 1 VW    VIEWS:Single    INDICATION: Shortness of breath    COMPARISON: CXR: 1/5/2018    FINDINGS:       - lines/tubes: None    - cardiac: Size within normal limits.    - mediastinum: Contour within normal limits.     - lungs: No evidence of a focal air space process. Interstitial  and airspace opacities bilaterally, may represent edema or  pneumonia  - pleura: No evidence of  fluid.      - osseous: Unremarkable for age.      Impression:      Interstitial opacities with a few scattered areas of airspace  opacification, may represent edema or pneumonia.      Electronically signed by:  Kiki Benton MD  4/10/2021 4:33 PM CDT  Workstation: [id number]            XR Chest 1 View    Result Date: 4/10/2021  Interstitial opacities with a few scattered areas of airspace opacification, may represent edema or pneumonia. Electronically signed by:  Kiki Benton MD  4/10/2021 4:33 PM CDT Workstation: [id number]      Medication Review:     Current Facility-Administered Medications:     acetaminophen (TYLENOL) tablet 650 mg, 650 mg, Oral, Q4H PRN, Juli Damon MD    aspirin EC tablet 81 mg, 81 mg, Oral, Daily, Juli Damon MD, 81 mg at 04/16/21 0845    atropine sulfate injection 0.5 mg, 0.5 mg, Intravenous, Q5 Min PRN, Juli Damon MD    calcium carbonate (TUMS)  chewable tablet 500 mg (200 mg elemental), 2 tablet, Oral, Q4H PRN, Juli Damon MD, 2 tablet at 04/14/21 1553    carvedilol (COREG) tablet 25 mg, 25 mg, Oral, BID With Meals, Juli Damon MD, 25 mg at 04/16/21 0846    cefTRIAXone (ROCEPHIN) 1 g/100 mL 0.9% NS (MBP), 1 g, Intravenous, Q24H, Juli Damon MD, Last Rate: 0 mL/hr at 04/12/21 0222, 1 g at 04/15/21 2103    dextrose (D50W) 25 g/ 50mL Intravenous Solution 25 g, 25 g, Intravenous, Q15 Min PRN, Juli Damon MD    dextrose (GLUTOSE) oral gel 15 g, 15 g, Oral, Q15 Min PRN, Juli Damon MD    digoxin (LANOXIN) tablet 125 mcg, 125 mcg, Oral, Daily, Juli Damon MD, 125 mcg at 04/15/21 1118    finasteride (PROSCAR) tablet 5 mg, 5 mg, Oral, Daily, Jlui Damon MD, 5 mg at 04/16/21 0845    furosemide (LASIX) tablet 20 mg, 20 mg, Oral, BID, Sera Ferguson APRN, 20 mg at 04/16/21 0846    glucagon (human recombinant) (GLUCAGEN DIAGNOSTIC) injection 1 mg, 1 mg, Subcutaneous, Q15 Min PRN, Juli Damon MD    heparin (porcine) 5000 UNIT/ML injection 5,000 Units, 5,000 Units, Subcutaneous, Q8H, Javier Evans MD, 5,000 Units at 04/16/21 0618    HYDROcodone-acetaminophen (NORCO)  MG per tablet 1 tablet, 1 tablet, Oral, Q4H PRN, Chuy Blackwell, APRN    insulin aspart (novoLOG) injection 0-7 Units, 0-7 Units, Subcutaneous, TID AC, Juli Damon MD, 4 Units at 04/16/21 0847    ipratropium-albuterol (DUO-NEB) nebulizer solution 3 mL, 3 mL, Nebulization, Q4H PRN, Juli Damon MD, 3 mL at 04/15/21 0951    ipratropium-albuterol (DUO-NEB) nebulizer solution 3 mL, 3 mL, Nebulization, Q4H - RT, Chuy Blackwell, APRN, 3 mL at 04/16/21 0801    losartan (COZAAR) tablet 25 mg, 25 mg, Oral, Q24H, Juli Damon MD, 25 mg at 04/16/21 0846    methylPREDNISolone sodium succinate (SOLU-Medrol) injection 40 mg,  40 mg, Intravenous, Q8H, Chuy Blackwell, APRN, 40 mg at 04/16/21 0618    morphine injection 2 mg, 2 mg, Intravenous, Q2H PRN, 2 mg at 04/16/21 0333 **AND** naloxone (NARCAN) injection 0.4 mg, 0.4 mg, Intravenous, Q5 Min PRN, Juli Damon MD    nitroglycerin (NITROSTAT) SL tablet 0.4 mg, 0.4 mg, Sublingual, Q5 Min PRN, Juli Damon MD, 0.4 mg at 04/15/21 0029    prochlorperazine (COMPAZINE) injection 5 mg, 5 mg, Intravenous, Q6H PRN, Juli Damon MD, 5 mg at 04/15/21 0346    ranolazine (RANEXA) 12 hr tablet 500 mg, 500 mg, Oral, Q12H, Juli Damon MD, 500 mg at 04/16/21 0845    rosuvastatin (CRESTOR) tablet 10 mg, 10 mg, Oral, Nightly, Juli Damon MD, 10 mg at 04/15/21 2001    sodium chloride 0.9 % flush 10 mL, 10 mL, Intravenous, PRN, Juli Damon MD    sodium chloride 0.9 % flush 10 mL, 10 mL, Intravenous, Q12H, Juli Damon MD, 10 mL at 04/16/21 0846    sodium chloride 0.9 % flush 10 mL, 10 mL, Intravenous, PRN, Juli Damon MD    sodium chloride 0.9 % infusion 250 mL, 250 mL, Intravenous, Once PRN, Juli Damon MD    tamsulosin (FLOMAX) 24 hr capsule 0.4 mg, 0.4 mg, Oral, Nightly, Juli Damon MD, 0.4 mg at 04/15/21 2001    Assessment/Plan       NSTEMI (non-ST elevated myocardial infarction) (CMS/HCC)    Type 2 diabetes mellitus without complication, without long-term current use of insulin (CMS/[facility])    Hypertension    Pneumonia due to infectious organism    Acute systolic congestive heart failure (CMS/HCC)    NSTEMI: Mr. Eze Downing is a 85-year-old  male with past medical history of type 2 diabetes, hypertension, degenerative disc disease, chronic pain, BPH who presented to the ER with shortness of breath and intermittent chest pain.  EKG was performed showing sinus tachycardia with right bundle branch block, rightward axis, diffuse ST-T changes with  nonspecific findings.  Troponin levels have been elevated at 0.722 on admission and and peaked at 1.930 consistent with non-ST segment elevation myocardial infarction. Lengthy/detailed discussion of options with wife.  Options were discussed with wife and she opted for invasive workup with [facility] per Dr. Damon. LHC showing severe MVD, extensive left to left and left to right collateral. During the procedure, patient developed severe symptomatic bradycardia and asystole requiring  resuscitation as per ACLS protocol for less than 2 minutes and placement of a temporary transvenous pacemaker. Afterwards, temporary pacemaker was removed. He remains hemodynamically stable and responding appropriately this morning. He is oxygenating well on 2L NC.     -CTVS was consulted for severe MVD given he is not a candidate for PCI. CTVS consult concluded poor targets for bypass, severe physical deconditioning. Patient would be high risk and medical management was recommended.     -Continue aspirin 81 mg daily.  -Continue lipid lowering therapy with rosuvastatin 10mg   -Continue Coreg 25 mg twice daily  -Continue Ranexa 500mg BID      first presentation of severe systolic heart failure. Etiology: likely ICM. LVEF 25%.  NYHA stage C. FC-IV.   Patient is currently with improving volume status and with adequate perfusion. The patient's hemodynamics are improving.  -BB: Coreg 25mg BID  -ACE/ARB/ENTRESTO: losartan 25mg   - DIGOXIN: Continue 125mcg  - IMDUR/HYDRALAZINE: n/a  -DIURETIC:  lasix 20mg po BID  -MRA: The patient is FC-NYHA Class IV and MRA is indicated.   - 6MWT: Will complete as outpatient  -Fluid restriction: 1500 mL a day  -Sodium restriction: 2000 mg day   -ICD:  Indicated.   QRS: 130ms    Bilateral pneumonia: Continue IV antibiotics per primary.  DuoNebs.    Delirium: Likely in the setting of dementia.  Improved today.    Diabetes mellitus type 2: Continue sliding scale insulin    Acute respiratory failure with hypoxia:  Currently off oxygen.    Plan for disposition: From a cardiology standpoint patient may be discharged per hospitalist discretion.  Cardiology will sign off at this point. Please contact us with any further questions or concerns. Anticipate discharge later today or tomorrow. Follow up with Dr. Damon in 4 weeks.               This document has been electronically signed by MART Zamorano on April 16, 2021 09:21 CDT     Electronically signed by MART Zamorano, 04/16/21, 9:24 AM CDT.    Patient examined and chart reviewed.  Agree with assessment and plan as outlined by MART Peraza.  In view of severe multivessel coronary artery disease and ischemic cardiomyopathy, maximal medical management recommended.  He is not a candidate for percutaneous intervention and deemed high risk for CABG.

## 2021-04-16 NOTE — PROGRESS NOTES
[facility] Medicine Services  INPATIENT PROGRESS NOTE    Length of Stay: 4  Date of Admission: 4/10/2021  Primary Care Physician: Vielka Ramirez DO    Subjective   Chief Complaint: shortness of breath  HPI:  85 year old male with a history of DMII, HTN, chronic pain, OA, and BPH who presented with shortness of breath, fatigue, and chest pain. He was found to have ST depression and T wave inversions on EKG with elevated troponin.  Chest x-ray noted interstitial opacities with scattered areas of airspace opacification.  He was initiated on antibiotics and bronchodilators. Cardiology consulted and recommended LHC.  LHC revealed severe multivessel disease. Echocardiogram notes EF 20-25%.  He developed severe symptomatic bradycardia and asystole requiring resuscitation as per ACLS protocol for less than 2 minutes and placement of a temporary transvenous pacemaker.  He was evaluated by CT surgery and it was felt that due to deconditioning and poor targets he was a poor candidate for CABG.  He is recommended maximum medical therapy. Shortness of breath has improved.  He has weaned to 1 lpm by nasal cannula.     Review of Systems   Constitutional: Negative for chills and fever.   Respiratory: Negative for cough, shortness of breath and wheezing.    Cardiovascular: Negative for chest pain.   Gastrointestinal: Negative for abdominal pain, nausea and vomiting.        All pertinent negatives and positives are as above. All other systems have been reviewed and are negative unless otherwise stated.     Objective    Temp:  [96.9 °F (36.1 °C)-97.9 °F (36.6 °C)] 97.8 °F (36.6 °C)  Heart Rate:  [] 101  Resp:  [18-20] 20  BP: (100-120)/(56-84) 117/56    Physical Exam  Vitals reviewed.   Constitutional:       General: He is not in acute distress.     Appearance: Normal appearance.   HENT:      Head: Normocephalic and atraumatic.   Cardiovascular:      Rate and Rhythm: Normal rate and  regular rhythm.   Pulmonary:      Effort: Pulmonary effort is normal. No respiratory distress.      Breath sounds: No wheezing.   Abdominal:      General: There is no distension.      Palpations: Abdomen is soft.      Tenderness: There is no abdominal tenderness.   Musculoskeletal:         General: No swelling or deformity.   Skin:     General: Skin is warm and dry.   Neurological:      General: No focal deficit present.      Mental Status: He is alert. Mental status is at baseline.             Results Review:  I have reviewed the labs, radiology results, and diagnostic studies.    Laboratory Data:   Results from last 7 days   Lab Units 04/16/21  0616 04/15/21  0612 04/14/21 0213 04/10/21  1539   SODIUM mmol/L 134* 138 136 132*   POTASSIUM mmol/L 4.2 4.0 3.8 3.7   CHLORIDE mmol/L 94* 100 97* 95*   CO2 mmol/L 30.0* 31.0* 26.0 26.0   BUN mg/dL 61* 60* 56* 19   CREATININE mg/dL 1.25 1.08 1.13 1.09   GLUCOSE mg/dL 265* 186* 245* 198*   CALCIUM mg/dL 8.9 9.0 8.8 9.7   BILIRUBIN mg/dL  --   --   --  0.8   ALK PHOS U/L  --   --   --  74   ALT (SGPT) U/L  --   --   --  14   AST (SGOT) U/L  --   --   --  48*   ANION GAP mmol/L 10.0 7.0 13.0 11.0     Estimated Creatinine Clearance: 43.3 mL/min (by C-G formula based on SCr of 1.25 mg/dL).  Results from last 7 days   Lab Units 04/16/21  0616 04/10/21  1539   MAGNESIUM mg/dL 1.9 1.2*         Results from last 7 days   Lab Units 04/16/21  0616 04/15/21  0612 04/14/21  0213 04/13/21  0655 04/12/21  0606   WBC 10*3/mm3 13.63* 11.17* 10.24 11.71* 13.22*   HEMOGLOBIN g/dL 9.5* 9.2* 9.6* 9.4* 9.6*   HEMATOCRIT % 29.5* 27.9* 27.9* 28.0* 28.7*   PLATELETS 10*3/mm3 229 194 182 144 139*     Results from last 7 days   Lab Units 04/10/21  1539   INR  0.98       Culture Data:   No results found for: BLOODCX  No results found for: URINECX  No results found for: RESPCX  No results found for: WOUNDCX  No results found for: STOOLCX  No components found for: BODYFLD    Radiology Data:   Imaging  Results (Last 24 Hours)     ** No results found for the last 24 hours. **          I have reviewed the patient's current medications.     Assessment/Plan     Active Hospital Problems    Diagnosis    • **NSTEMI (non-ST elevated myocardial infarction) (CMS/[facility])    • Acute systolic congestive heart failure (CMS/[facility])    • Pneumonia due to infectious organism    • Hypertension    • Type 2 diabetes mellitus without complication, without long-term current use of insulin (CMS/[facility])        Plan:    S/P LHC revealing severe multivessel disease.  Poor candidate for CABG and recommended maximum medical management.  Aspirin  Crestor  Beta-blocker: Coreg 25 mg PO BID  ACEI/ARB: Cozaar 25 mg PO daily  Digoxin 125 mcg daily  Lasix 20 mg PO BID  Ranexa 500 mg PO BID  Cardiology consultation appreciated  Wean O2 to room air as tolerated  Rocephin day 7/7  Doxycycline, 5 days completed  Duonebs, OPEP  Taper steroids to prednisone 40 mg daily for 4 days  Glucose control: SSI  VTE PPx: heparin  Discharge planning: will arrange for home health    The patient was evaluated during the global COVID-19 pandemic, and the diagnosis was suspected/considered upon their initial presentation.  Evaluation, treatment, and testing were consistent with current guidelines for patients who present with complaints or symptoms that may be related to COVID-19.        This document has been electronically signed by MART Osei on April 16, 2021 11:16 CDT

## 2021-04-16 NOTE — THERAPY EVALUATION
Patient Name: Eze Downing  : 1935    MRN: [id number]                              Today's Date: 2021       Admit Date: 4/10/2021    Visit Dx:     ICD-10-CM ICD-9-CM   1. Pneumonia due to infectious organism, unspecified laterality, unspecified part of lung  J18.9 486   2. NSTEMI (non-ST elevated myocardial infarction) (CMS/[facility])  I21.4 410.70   3. Oral phase dysphagia  R13.11 787.21   4. Impaired mobility and activities of daily living  Z74.09 V49.89    Z78.9    5. Acute systolic congestive heart failure (CMS/[facility])  I50.21 428.21     428.0   6. Impaired functional mobility, balance, gait, and endurance  Z74.09 V49.89     Patient Active Problem List   Diagnosis   • Non-recurrent unilateral inguinal hernia without obstruction or gangrene   • Retention of urine   • Enlarged prostate with urinary obstruction   • BPH (benign prostatic hyperplasia)   • Type 2 diabetes mellitus without complication, without long-term current use of insulin (CMS/[facility])   • Multiple joint pain   • Hypertension   • Gastroesophageal reflux disease   • Chronic obstructive pulmonary disease (CMS/[facility])   • Chronic low back pain without sciatica   • At high risk for falls   • Pneumonia due to infectious organism   • NSTEMI (non-ST elevated myocardial infarction) (CMS/[facility])   • Acute systolic congestive heart failure (CMS/[facility])     Past Medical History:   Diagnosis Date   • Arthritis    • Broken ribs 2018   • Diabetes mellitus (CMS/[facility])    • Hypertension    • Inguinal hernia      Past Surgical History:   Procedure Laterality Date   • APPENDECTOMY     • CARDIAC CATHETERIZATION N/A 2021    Procedure: Left Heart Cath;  Surgeon: Juli Damon MD;  Location: [facility] CATH INVASIVE LOCATION;  Service: Cardiovascular;  Laterality: N/A;   • CYSTOSCOPY N/A 2018    Procedure: CYSTOSCOPY;  Surgeon: Michael Forbes MD;  Location: [facility] OR;  Service: Urology   • CYSTOSCOPY TRANSURETHRAL RESECTION OF PROSTATE  N/A 5/9/2018    Procedure: CYSTOSCOPY TRANSURETHRAL RESECTION OF PROSTATE; PLACEMENT SUPRAPUBIC CATHETER;  Surgeon: Michael Forbes MD;  Location: [facility];  Service: Urology   • INGUINAL HERNIA REPAIR Left 4/19/2018    Procedure: OPEN REPAIR OF A LARGE INGUINAL HERNIA;  Surgeon: Ramin Collado MD;  Location: [facility];  Service: General     General Information     Row Name 04/16/21 1001          Physical Therapy Time and Intention    Document Type  evaluation  -     Mode of Treatment  individual therapy;physical therapy  -     Row Name 04/16/21 1001          General Information    Patient Profile Reviewed  yes  -MICHELLE     Prior Level of Function  independent:;all household mobility;gait;transfer;dependent:;driving wife does driving/IADL  -     Existing Precautions/Restrictions  fall  -     Row Name 04/16/21 1001          Living Environment    Lives With  spouse  -     Row Name 04/16/21 1001          Home Main Entrance    Number of Stairs, Main Entrance  -- has ramp with rail on left  -     Row Name 04/16/21 1001          Stairs Within Home, Primary    Number of Stairs, Within Home, Primary  none  -     Row Name 04/16/21 1001          Cognition    Orientation Status (Cognition)  oriented to;person;place pt/wife report pt does not keep up with date since he retired  -     Row Name 04/16/21 1001          Safety Issues, Functional Mobility    Safety Issues Affecting Function (Mobility)  safety precaution awareness  -     Impairments Affecting Function (Mobility)  balance;endurance/activity tolerance;strength  -       User Key  (r) = Recorded By, (t) = Taken By, (c) = Cosigned By    Initials Name Provider Type    MICHELLE Loraine Cole PT Physical Therapist        Mobility     Row Name 04/16/21 1001          Bed Mobility    Bed Mobility  sit-supine;rolling left;rolling right;scooting/bridging  -MICHELLE     Rolling Left [place] (Bed Mobility)  standby assist  -     Rolling Right [place] (Bed Mobility)   standby assist  -     Scooting/Bridging [place] (Bed Mobility)  maximum assist (25% patient effort);2 person assist  -     Sit-Supine [place] (Bed Mobility)  contact guard  -     Assistive Device (Bed Mobility)  bed rails  -MICHELLE     Row Name 04/16/21 1001          Bed-Chair Transfer    Bed-Chair [place] (Transfers)  nonverbal cues (demo/gesture);verbal cues;moderate assist (50% patient effort)  -MICHELLE     Row Name 04/16/21 1001          Sit-Stand Transfer    Sit-Stand [place] (Transfers)  verbal cues;nonverbal cues (demo/gesture);moderate assist (50% patient effort)  -MICHELLE     Row Name 04/16/21 1001          Gait/Stairs (Locomotion)    [place] Level (Gait)  moderate assist (50% patient effort);1 person assist;1 person to manage equipment  -     Distance in Feet (Gait)  3ft from recliner to bed  -       User Key  (r) = Recorded By, (t) = Taken By, (c) = Cosigned By    Initials Name Provider Type    Loraine Negrete, PT Physical Therapist        Obj/Interventions     [facility] Name 04/16/21 1001          Range of Motion Comprehensive    Comment, General Range of Motion  AROM WFL all extremities  -MICHELLE     Row Name 04/16/21 1001          Strength Comprehensive (MMT)    Comment, General Manual Muscle Testing (MMT) Assessment  BLE: 4/5 ankles/feet, knees, hips  -MICHELLE     Row Name 04/16/21 1001          Balance    Balance Assessment  sitting static balance;sitting dynamic balance;standing static balance;standing dynamic balance  -     Static Sitting Balance  WFL  -     Dynamic Sitting Balance  mild impairment  -     Static Standing Balance  moderate impairment  -     Dynamic Standing Balance  moderate impairment  -MICHELLE     Row Name 04/16/21 1001          Sensory Assessment (Somatosensory)    Sensory Assessment (Somatosensory)  sensation intact to light touch  -       User Key  (r) = Recorded By, (t) = Taken By, (c) = Cosigned By    Initials Name Provider Type    Loraine Negrete, PT Physical  Therapist        Goals/Plan     Row Name 04/16/21 1001          Bed Mobility Goal 1 (PT)    Activity/Assistive Device (Bed Mobility Goal 1, PT)  sit to supine  -MICHELLE     [place] Level/Cues Needed (Bed Mobility Goal 1, PT)  independent  -MICHELLE     Time Frame (Bed Mobility Goal 1, PT)  2 days  -MICHELLE     Progress/Outcomes (Bed Mobility Goal 1, PT)  goal not met  -[facility] Name 04/16/21 1001          Transfer Goal 1 (PT)    Activity/Assistive Device (Transfer Goal 1, PT)  sit-to-stand/stand-to-sit;bed-to-chair/chair-to-bed  -MICHELLE     [place] Level/Cues Needed (Transfer Goal 1, PT)  standby assist;modified independence  -MICHELLE     Time Frame (Transfer Goal 1, PT)  5 days  -MICHELLE     Progress/Outcome (Transfer Goal 1, PT)  goal not met  -MICEHLLE     Row Name 04/16/21 1001          Gait Training Goal 1 (PT)    Activity/Assistive Device (Gait Training Goal 1, PT)  gait (walking locomotion);assistive device use  -MICHELLE     [place] Level (Gait Training Goal 1, PT)  contact guard assist;standby assist  -MICHELLE     Distance (Gait Training Goal 1, PT)  50ft or more x2  -MICHELLE     Time Frame (Gait Training Goal 1, PT)  1 week  -MICHELLE     Progress/Outcome (Gait Training Goal 1, PT)  goal not met  -MICHELLE     Row Name 04/16/21 1001          ROM Goal 1 (PT)    ROM Goal 1 (PT)  Pt will tolerate LE exercises OOB in chair with VSS  -MICHELLE     Time Frame (ROM Goal 1, PT)  by discharge  -MICHELLE     Progress/Outcome (ROM Goal 1, PT)  goal not met  -       User Key  (r) = Recorded By, (t) = Taken By, (c) = Cosigned By    Initials Name Provider Type    MICHELLE Loraine Cole PT Physical Therapist        Clinical Impression     Row Name 04/16/21 1001          Pain    Additional Documentation  Pain Scale: Numbers Pre/Post-Treatment (Group)  -MICHELLE     Row Name 04/16/21 1001          Pain Scale: Numbers Pre/Post-Treatment    Pretreatment Pain Rating  8/10  -     Posttreatment Pain Rating  8/10  -     Pain Location  chest;abdomen  -     Pre/Posttreatment Pain Comment   Pt mostly complains of nausea;RN brought pt anti-nausea med  -     Pain Intervention(s)  Repositioned;Rest  -     Row Name 04/16/21 1001          Plan of Care Review    Plan of Care Reviewed With  patient  -MICHELLE     Outcome Summary  PT evaluation completed. RN reports ok to see pt and he was about to help pt back to bed from recliner. Pt transferred sit to stand to sit with moderate assistance of 1. Pt transferred recliner to bed with moderate assistance of 1. Pt ambulated 3 ft to bed with narrow ADWOA and decreased step length. Pt gave pain rating but mostly complained of nausea which was reported to RN and RN gave med. Function limited by decreased strength, balance, and tolerance for functional mobility and activities. Pt will benefit from PT to gradually regain lost function as pt iproves medically. Anticipate need for 24/7 care at discharge with continued therapy services.  -     Row Name 04/16/21 1001          Therapy Assessment/Plan (PT)    Patient/Family Therapy Goals Statement (PT)  return home to [facility]  -     Rehab Potential (PT)  good, to achieve stated therapy goals  -     Criteria for Skilled Interventions Met (PT)  yes;meets criteria;skilled treatment is necessary  -     Predicted Duration of Therapy Intervention (PT)  until discharge or goals met  -     Row Name 04/16/21 1001          Vital Signs    Pre Systolic BP Rehab  117  -MICHELLE     Pre Treatment Diastolic BP  56  -MICHELLE     Post Systolic BP Rehab  107  -MICHELLE     Post Treatment Diastolic BP  62  -MICHELLE     Pretreatment Heart Rate (beats/min)  95  -MICHELLE     Posttreatment Heart Rate (beats/min)  98  -MICHELLE     Pre SpO2 (%)  92  -MICHELLE     O2 Delivery Pre Treatment  room air  -MICHELLE     Post SpO2 (%)  94  -MICHELLE     O2 Delivery Post Treatment  room air  -MICHELLE     Pre Patient Position  Sitting  -MICHELLE     Post Patient Position  Supine  -     Row Name 04/16/21 1001          Positioning and Restraints    Pre-Treatment Position  sitting in chair/recliner  -MICHELLE     Post  Treatment Position  bed  -MICHELLE     In Bed  notified nsg;fowlers;call light within reach;encouraged to call for assist;exit alarm on;with family/caregiver;with nsg  -MICHELLE       User Key  (r) = Recorded By, (t) = Taken By, (c) = Cosigned By    Initials Name Provider Type    Loraine Negrete PT Physical Therapist        Outcome Measures     Row Name 04/16/21 1001          How much help from another person do you currently need...    Turning from your back to your side while in flat bed without using bedrails?  4  -MICHELLE     Moving from lying on back to sitting on the side of a flat bed without bedrails?  3  -MICHELLE     Moving to and from a bed to a chair (including a wheelchair)?  2  -MICHELLE     Standing up from a chair using your arms (e.g., wheelchair, bedside chair)?  2  -MICHELLE     Climbing 3-5 steps with a railing?  1  -MICHELLE     To walk in hospital room?  2  -MICHELLE     AM-PAC 6 Clicks Score (PT)  14  -MICHELLE     Row Name 04/16/21 1001          Functional Assessment    Outcome Measure Options  AM-PAC 6 Clicks Basic Mobility (PT)  -       User Key  (r) = Recorded By, (t) = Taken By, (c) = Cosigned By    Initials Name Provider Type    Loraine Negrete PT Physical Therapist        Physical Therapy Education                 Title: PT OT SLP Therapies (In Progress)     Topic: Physical Therapy (In Progress)     Point: Mobility training (In Progress)     Learning Progress Summary           Patient Acceptance, E, NR by  at 4/16/2021 1209                   Point: Home exercise program (Not Started)     Learner Progress:  Not documented in this visit.          Point: Body mechanics (In Progress)     Learning Progress Summary           Patient Acceptance, E, NR by  at 4/16/2021 1209                   Point: Precautions (In Progress)     Learning Progress Summary           Patient Acceptance, E, NR by  at 4/16/2021 1209                               User Key     Initials Effective Dates Name Provider Type [facility] 04/03/18 -  [place]  Loraine RUTHERFORD PT Physical Therapist PT              PT Recommendation and Plan  Planned Therapy Interventions (PT): balance training, bed mobility training, gait training, home exercise program, patient/family education, stair training, strengthening, transfer training  Plan of Care Reviewed With: patient  Outcome Summary: PT evaluation completed. RN reports ok to see pt and he was about to help pt back to bed from recliner. Pt transferred sit to stand to sit with moderate assistance of 1. Pt transferred recliner to bed with moderate assistance of 1. Pt ambulated 3 ft to bed with narrow ADWOA and decreased step length. Pt gave pain rating but mostly complained of nausea which was reported to RN and RN gave med. Function limited by decreased strength, balance, and tolerance for functional mobility and activities. Pt will benefit from PT to gradually regain lost function as pt iproves medically. Anticipate need for 24/7 care at discharge with continued therapy services.     Time Calculation:   PT Charges     Row Name 04/16/21 1210             Time Calculation    Start Time  1001  -      Stop Time  1055  -      Time Calculation (min)  54 min  -      PT Received On  04/16/21  -      PT Goal Re-Cert Due Date  04/29/21  -         Time Calculation- PT    Total Timed Code Minutes- PT  35 minute(s)  -        User Key  (r) = Recorded By, (t) = Taken By, (c) = Cosigned By    Initials Name Provider Type    Loraine Negrete PT Physical Therapist        Therapy Charges for Today     Code Description Service Date Service Provider Modifiers Qty    50993117042  PT EVAL MOD COMPLEXITY 2 4/16/2021 Loraine Cole, PT GP 1    [id number]  PT THERAPEUTIC ACT EA 15 MIN 4/16/2021 Loraine Cole, PT GP 2          PT G-Codes  Outcome Measure Options: AM-PAC 6 Clicks Basic Mobility (PT)  AM-PAC 6 Clicks Score (PT): 14  AM-PAC 6 Clicks Score (OT): 14    Loraine Cole PT  4/16/2021

## 2021-04-16 NOTE — PLAN OF CARE
Goal Outcome Evaluation:     Progress: no change  Outcome Summary: Initital assessment.  Intak 25% - 4x.  Encourage intake of mealsl and supplement.

## 2021-04-16 NOTE — PLAN OF CARE
Problem: Adult Inpatient Plan of Care  Goal: Plan of Care Review  Recent Flowsheet Documentation  Taken 4/16/2021 1001 by Loraine Cole PT  Plan of Care Reviewed With: patient  Outcome Summary: PT evaluation completed. RN reports ok to see pt and he was about to help pt back to bed from recliner. Pt transferred sit to stand to sit with moderate assistance of 1. Pt transferred recliner to bed with moderate assistance of 1. Pt ambulated 3 ft to bed with narrow ADWOA and decreased step length. Pt gave pain rating but mostly complained of nausea which was reported to RN and RN gave med. Function limited by decreased strength, balance, and tolerance for functional mobility and activities. Pt will benefit from PT to gradually regain lost function as pt iproves medically. Anticipate need for 24/7 care at discharge with continued therapy services.   Goal Outcome Evaluation:  Plan of Care Reviewed With: patient     Outcome Summary: PT evaluation completed. RN reports ok to see pt and he was about to help pt back to bed from recliner. Pt transferred sit to stand to sit with moderate assistance of 1. Pt transferred recliner to bed with moderate assistance of 1. Pt ambulated 3 ft to bed with narrow ADWOA and decreased step length. Pt gave pain rating but mostly complained of nausea which was reported to RN and RN gave med. Function limited by decreased strength, balance, and tolerance for functional mobility and activities. Pt will benefit from PT to gradually regain lost function as pt iproves medically. Anticipate need for 24/7 care at discharge with continued therapy services.

## 2021-04-17 NOTE — DISCHARGE PLACEMENT REQUEST
"Eze Valdes (85 y.o. Male)     Date of Birth Social Security Number Address Home Phone MRN    1935  [address] [place] [address] [phone] [id number]    Spiritism Marital Status          Anabaptism        Admission Date Admission Type Admitting Provider Attending Provider Department, Room/Bed    4/10/21 Emergency Warren Shannon MD Williams, Kevin L, MD [address], 327/1    Discharge Date Discharge Disposition Discharge Destination         Home-Health Care [facility] [place]              Attending Provider: Javier Evans MD    Allergies: Zofran [Ondansetron Hcl]    Isolation: None   Infection: None   Code Status: CPR    Ht: 170.2 cm (67.01\")   Wt: 69.1 kg (152 lb 7 oz)    Admission Cmt: None   Principal Problem: NSTEMI (non-ST elevated myocardial infarction) (CMS/[facility]) [I21.4]                 Active Insurance as of 4/10/2021     Primary Coverage     Payor Plan Insurance Group Employer/Plan Group    MEDICARE MEDICARE A & B      Payor Plan Address Payor Plan Phone Number Payor Plan Fax Number Effective Dates    [address] [phone]  7/1/2002 - None Entered    [place] [id number]       Subscriber Name Subscriber Birth Date Member ID       EZE VALDES 1935 [id number]                 Emergency Contacts      (Rel.) Home Phone Work Phone Mobile Phone    Odalis Valdes (Spouse) [phone] -- [phone]    SantiagoLa (Daughter) [phone] -- [phone]            Adri Massey RN [facility]  [phone] phone  [phone] fax  "

## 2021-04-17 NOTE — DISCHARGE SUMMARY
[facility] Medicine Services  DISCHARGE SUMMARY       Date of Admission: 4/10/2021  Date of Discharge:  4/17/2021  Primary Care Physician: Vielka Ramirez DO    Presenting Problem/History of Present Illness:  NSTEMI (non-ST elevated myocardial infarction) (CMS/[facility]) [I21.4]  Pneumonia due to infectious organism, unspecified laterality, unspecified part of lung [J18.9]     Final Discharge Diagnoses:    NSTEMI (non-ST elevated myocardial infarction) (CMS/[facility])    Type 2 diabetes mellitus without complication, without long-term current use of insulin (CMS/[facility])    Hypertension    Pneumonia due to infectious organism    Acute systolic congestive heart failure (CMS/[facility])      Consults:   Consults     Date and Time Order Name Status Description    4/13/2021  2:56 PM Inpatient Cardiothoracic Surgery Consult Completed     4/10/2021  6:14 PM Inpatient Cardiology Consult            Procedures Performed: Procedure(s):  Left Heart Cath              Pertinent Test Results:   Adult Transthoracic Echo Complete W/ Cont if Necessary Per Protocol    Result Date: 4/11/2021  · Estimated left ventricular EF = 25% Left ventricular ejection fraction appears to be 21 - 25%. Left ventricular systolic function is moderately decreased. The left ventricular cavity is mildly dilated. Mid/ Distal Septum, [place], Anterior wall severely hypokinetic/ dyskinetic Left ventricular diastolic function is consistent with (grade Ia w/high LAP) impaired relaxation. · Mild to moderate mitral valve regurgitation is present. · Estimated right ventricular systolic pressure from tricuspid regurgitation is markedly elevated (>55 mmHg).      Cardiac Catheterization/Vascular Study    Result Date: 4/13/2021  Cardiac Catheterization Operative Report Eze Downing [id number] 4/13/2021 Indication: Mr. Eze Downing is a 85-year-old  male with history of type 2 diabetes, hypertension, degenerative disc disease,  chronic pain, mild dementia, BPH who presented with > 1 week history of dyspnea on exertion and intermittent chest pain.  EKG showed right bundle branch block and diffuse nonspecific ST-T changes.  Troponins were elevated and peaked at 1.93 suggesting a non-ST segment elevation myocardial infarction.  Echocardiogram showed LV dysfunction with an ejection fraction of 20 to 25% with hypokinesis of the septum, apex, anterio lateral wall and pulmonary hypertension.  Definitive evaluation by cardiac catheterization was recommended.  All risks and benefits were explained and the patient and family were aware of increased risks in view of multiple comorbidities. Procedures Performed: 1.  Coronary angiogram 2.  Temporary transvenous pacemaker placement 3.  Left heart catheterization Procedure Details The risks, benefits, complications, treatment options, and expected outcomes were discussed with the patient. The patient and/or family concurred with the proposed plan, giving informed consent. Patient was brought to the cath lab after IV hydration was begun and oral premedication was given.  He was prepped and draped in the usual manner. Using the modified Seldinger access technique, a 6 Andorran sheath was placed in the Right Common Femoral artery under ultrasound guidance. Diagnostic catheterization was performed using 6 Andorran F L4, FR4/ 3DRC/modified Amplatz right and pigtail catheters. A 5 Andorran sheath was placed in the right femoral vein to advance a temporary transvenous pacemaker during the procedure.  Left heart catheterization was attempted using a 6 Andorran pigtail catheter. Vascular Access: Right femoral artery and right femoral vein. Findings: Left Main Coronary Artery: This was a patent vessel with minor luminal irregularities and noncritical disease up to 20% with some degree of calcification noted.  Left main divided into a left anterior descending coronary artery, ramus intermedius coronary artery and left  circumflex coronary artery. Left Anterior Descending Coronary Artery: This was a medium size vessel with a long area of diffuse stenosis up to 80 to 90% in the proximal segment followed by an ectatic segment and after the origin of a small bifurcating diagonal branch the LAD was 100% occluded.  This appeared to be chronic total occlusion.  There was reconstitution of the LAD more distally and beyond this point the LAD was a small caliber vessel with diffuse luminal irregularities and wrapped around the apex of the heart.  There were multiple septal perforators participating in extensive left to left and left to right collaterals. Diagonal 1 coronary artery was a small caliber bifurcating vessel with diffuse disease. Calcification was noted in the proximal LAD. Ramus intermedius coronary artery: This was a small caliber diffusely diseased vessel in the proximal segment up to 90% followed by bifurcation into 2 branches.  Both branches had diffuse disease up to 80 to 90% in the proximal segment. Left Circumflex Coronary Artery: This vessel was diffusely diseased in the proximal segment and gave rise to a small caliber obtuse marginal 1 which had diffuse disease in the proximal segment up to 90%. Mid circumflex was 100% occluded.  There were left to left and left to right collaterals noted from the circumflex system/obtuse marginal Right Coronary artery: This vessel could not be selectively engaged in spite of multiple attempts with multiple catheters.  This vessel is most likely 100% occluded since there was significant left to right collaterals seen filling the PDA/PLV branch. Left Heart Catheterization: Attempt was made to cross the aortic valve with a pigtail catheter to record the pressures in the left ventricle, in particular the EDP. Soon after crossing the aortic valve the patient developed severe bradycardia and asystole requiring CPR for less than 2 minutes, administration of epinephrine and atropine.  I was  able to place a 5 Wolof catheter in the right femoral artery and a temporary transvenous pacemaker was advanced to the right ventricle and RV was paced at about 60 bpm.  This resulted in very quick restoration of normal sinus rhythm and the patient had sinus tachycardia with blood pressure of 150/100 mmHg.  The patient was unresponsive for very brief.  After which he was more awake responding to questions and his O2 saturation was greater than 90%.  He was placed on BiPAP. After about 5 to 10 minutes it was noted that the patient remained in sinus rhythm with heart rate between 90 and 110 bpm and hence temporary transvenous pacemaker was removed.  The patient was administered 20 mg of IV Lasix. After the above measures patient was awake and responding appropriately to questions and remained hemodynamically stable. Impression: Severe multivessel coronary artery disease with critical lesions noted in the left anterior descending coronary artery, diagonal coronary artery, ramus intermedius coronary artery, circumflex coronary artery, obtuse marginal coronary artery and right coronary artery as described above. Extensive left to left and left to right collaterals. Patient is known to have LV dysfunction with an ejection fraction of 20 to 25% by echocardiogram The patient developed severe symptomatic bradycardia and asystole requiring resuscitation as per ACLS protocol for less than 2 minutes and placement of a temporary transvenous pacemaker as described above in detail. The patient became hemodynamically stable and remained awake and responding appropriately to commands post resuscitation, maintaining his oxygenation. Maximum guideline directed medical therapy will be continued at this time and a CT surgery consultation will be obtained.  He is not a candidate for percutaneous intervention in view of diffuse multivessel coronary artery disease/ involving the LAD, and small caliber of coronary arteries. Estimated  Blood Loss:  Minimal      Complications:  None; patient tolerated the procedure well. Specimen: Not applicable        Disposition: ICU        Condition: Stable Juli Damon MD 4/13/2021 18:48 CDT    XR Chest 1 View    Result Date: 4/10/2021  PROCEDURE: XR CHEST 1 VW VIEWS:Single INDICATION: Shortness of breath COMPARISON: CXR: 1/5/2018 FINDINGS:   - lines/tubes: None   - cardiac: Size within normal limits.   - mediastinum: Contour within normal limits.   - lungs: No evidence of a focal air space process. Interstitial and airspace opacities bilaterally, may represent edema or pneumonia - pleura: No evidence of  fluid.    - osseous: Unremarkable for age.     Interstitial opacities with a few scattered areas of airspace opacification, may represent edema or pneumonia. Electronically signed by:  Kiki Benton MD  4/10/2021 4:33 PM CDT Workstation: [id number]      HPI/Hospital Course:  The patient is a 85 y.o. male with a history of DMII, HTN, chronic pain, OA, and BPH who presented to [facility] with shortness of breath, fatigue, and chest pain. He was found to have ST depression and T wave inversions on EKG with elevated troponin.  Chest x-ray noted interstitial opacities with scattered areas of airspace opacification.  He was initiated on antibiotics and bronchodilators. Cardiology consulted and recommended LHC.  LHC revealed severe multivessel disease. Echocardiogram notes EF 20-25%.  He developed severe symptomatic bradycardia and asystole requiring resuscitation as per ACLS protocol for less than 2 minutes and placement of a temporary transvenous pacemaker.  He was evaluated by CT surgery and it was felt that due to deconditioning and poor targets he was a poor candidate for CABG.  He is recommended maximum medical therapy.  He developed wheezing and increased oxygen requirements.  He was managed with bronchodilators and steroids.  He has weaned to room air. He is stable and discharged  "to home with home health services.    Condition on Discharge:  Stable    Physical Exam on Discharge:  /76 (BP Location: Right arm, Patient Position: Lying)   Pulse 96   Temp 97.4 °F (36.3 °C) (Axillary)   Resp 18   Ht 170.2 cm (67.01\")   Wt 69.1 kg (152 lb 7 oz)   SpO2 99%   BMI 23.87 kg/m²   Physical Exam  Vitals reviewed.   Constitutional:       General: He is not in acute distress.     Appearance: Normal appearance.   HENT:      Head: Normocephalic and atraumatic.   Cardiovascular:      Rate and Rhythm: Normal rate and regular rhythm.   Pulmonary:      Effort: Pulmonary effort is normal. No respiratory distress.      Breath sounds: No wheezing.   Abdominal:      General: There is no distension.      Palpations: Abdomen is soft.      Tenderness: There is no abdominal tenderness.   Musculoskeletal:         General: No swelling or deformity.   Skin:     General: Skin is warm and dry.   Neurological:      General: No focal deficit present.      Mental Status: He is alert. Mental status is at baseline.     Discharge Disposition:  [facility] [place] [facility]    Discharge Medications:     Discharge Medications      New Medications      Instructions Start Date   albuterol sulfate  (90 Base) MCG/ACT inhaler  Commonly known as: PROVENTIL HFA;VENTOLIN HFA;PROAIR HFA   2 puffs, Inhalation, Every 4 Hours PRN      aspirin 81 MG EC tablet   81 mg, Oral, Daily   Start Date: April 18, 2021     digoxin 125 MCG tablet  Commonly known as: LANOXIN   125 mcg, Oral, Daily Digoxin      furosemide 20 MG tablet  Commonly known as: LASIX   20 mg, Oral, 2 Times Daily      losartan 25 MG tablet  Commonly known as: COZAAR   25 mg, Oral, Every 24 Hours Scheduled   Start Date: April 18, 2021     nitroglycerin 0.4 MG SL tablet  Commonly known as: NITROSTAT   0.4 mg, Sublingual, Every 5 Minutes PRN, Take no more than 3 doses in 15 minutes.      predniSONE 20 MG tablet  Commonly known as: DELTASONE   20 mg, Oral, Daily      ranolazine " 500 MG 12 hr tablet  Commonly known as: RANEXA   500 mg, Oral, Every 12 Hours Scheduled      rosuvastatin 10 MG tablet  Commonly known as: CRESTOR   10 mg, Oral, Nightly         Changes to Medications      Instructions Start Date   carvedilol 25 MG tablet  Commonly known as: COREG  What changed:   · medication strength  · how much to take   25 mg, Oral, 2 Times Daily With Meals         Continue These Medications      Instructions Start Date   finasteride 5 MG tablet  Commonly known as: PROSCAR   5 mg, Oral, Daily      HYDROcodone-acetaminophen  MG per tablet  Commonly known as: NORCO   TAKE 1 TABLET BY MOUTH EVERY SIX HOURS AS NEEDED FOR PAIN      tamsulosin 0.4 MG capsule 24 hr capsule  Commonly known as: FLOMAX   1 capsule, Oral, Nightly         Stop These Medications    lisinopril 10 MG tablet  Commonly known as: PRINIVILZESTRIL     metFORMIN 500 MG tablet  Commonly known as: GLUCOPHAGE     NIFEdipine XL 30 MG 24 hr tablet  Commonly known as: PROCARDIA XL            Discharge Diet:   Diet Instructions     Diet: Regular, Consistent Carbohydrate, Cardiac; Thin      Discharge Diet:  Regular  Consistent Carbohydrate  Cardiac       Fluid Consistency: Thin          Activity at Discharge:   Activity Instructions     Activity as Tolerated          Follow-up Appointments:   1. PCP 1 week  2. Dr. Damon 4 weeks    Chuy Blackwell, APRN  04/17/21  10:51 CDT    Time: Discharge planning and teaching took greater than 30 minutes.

## 2021-04-17 NOTE — PLAN OF CARE
Problem: Adult Inpatient Plan of Care  Goal: Plan of Care Review  Outcome: Ongoing, Progressing  Flowsheets  Taken 4/17/2021 0130 by Beth Chadwick RN  Progress: no change  Outcome Summary: pt vs stable, pt very restless overnight, family at bedside, prn pain meds given.  Taken 4/16/2021 1001 by Loraine Cole PT  Plan of Care Reviewed With: patient     Problem: Adult Inpatient Plan of Care  Goal: Patient-Specific Goal (Individualized)  Outcome: Ongoing, Progressing     Problem: Adult Inpatient Plan of Care  Goal: Absence of Hospital-Acquired Illness or Injury  Outcome: Ongoing, Progressing     Problem: Adult Inpatient Plan of Care  Goal: Absence of Hospital-Acquired Illness or Injury  Intervention: Identify and Manage Fall Risk  Recent Flowsheet Documentation  Taken 4/17/2021 0020 by Beth Chadwick RN  Safety Promotion/Fall Prevention:   activity supervised   assistive device/personal items within reach   clutter free environment maintained   fall prevention program maintained   safety round/check completed   nonskid shoes/slippers when out of bed  Taken 4/16/2021 2235 by Beth Chadwick RN  Safety Promotion/Fall Prevention:   activity supervised   clutter free environment maintained   assistive device/personal items within reach   fall prevention program maintained   safety round/check completed   nonskid shoes/slippers when out of bed  Taken 4/16/2021 2120 by Beth Chadwick RN  Safety Promotion/Fall Prevention:   activity supervised   clutter free environment maintained   assistive device/personal items within reach   fall prevention program maintained   safety round/check completed   nonskid shoes/slippers when out of bed  Taken 4/16/2021 2041 by Beth Chadwick RN  Safety Promotion/Fall Prevention:   activity supervised   assistive device/personal items within reach   clutter free environment maintained   fall prevention program maintained   safety round/check completed   nonskid  shoes/slippers when out of bed  Taken 4/16/2021 1918 by Beth Chadwick RN  Safety Promotion/Fall Prevention:   activity supervised   assistive device/personal items within reach   clutter free environment maintained   fall prevention program maintained   safety round/check completed   nonskid shoes/slippers when out of bed     Problem: Adult Inpatient Plan of Care  Goal: Absence of Hospital-Acquired Illness or Injury  Intervention: Prevent Skin Injury  Recent Flowsheet Documentation  Taken 4/17/2021 0020 by Beth Chadwick RN  Body Position: dangle, side of bed  Taken 4/16/2021 2235 by Beth Chadwick RN  Body Position: dangle, side of bed  Taken 4/16/2021 2041 by Beth Chadwick RN  Body Position: sitting up in bed  Skin Protection: incontinence pads utilized     Problem: Adult Inpatient Plan of Care  Goal: Absence of Hospital-Acquired Illness or Injury  Intervention: Prevent and Manage VTE (venous thromboembolism) Risk  Recent Flowsheet Documentation  Taken 4/16/2021 2041 by Beth Chadwick RN  VTE Prevention/Management: (hepain) other (see comments)     Problem: Adult Inpatient Plan of Care  Goal: Optimal Comfort and Wellbeing  Outcome: Ongoing, Progressing     Problem: Adult Inpatient Plan of Care  Goal: Optimal Comfort and Wellbeing  Intervention: Provide Person-Centered Care  Recent Flowsheet Documentation  Taken 4/16/2021 2041 by Beth Chadwick RN  Trust Relationship/Rapport:   care explained   questions answered   thoughts/feelings acknowledged     Problem: Adult Inpatient Plan of Care  Goal: Readiness for Transition of Care  Outcome: Ongoing, Progressing     Problem: Skin Injury Risk Increased  Goal: Skin Health and Integrity  Outcome: Ongoing, Progressing     Problem: Skin Injury Risk Increased  Goal: Skin Health and Integrity  Intervention: Optimize Skin Protection  Recent Flowsheet Documentation  Taken 4/16/2021 2041 by Beth Chadwick RN  Pressure Reduction Devices:  pressure-redistributing mattress utilized  Skin Protection: incontinence pads utilized     Problem: Fall Injury Risk  Goal: Absence of Fall and Fall-Related Injury  Outcome: Ongoing, Progressing     Problem: Fall Injury Risk  Goal: Absence of Fall and Fall-Related Injury  Intervention: Identify and Manage Contributors to Fall Injury Risk  Recent Flowsheet Documentation  Taken 4/16/2021 2235 by Beth Chadwick RN  Medication Review/Management: medications reviewed     Problem: Fall Injury Risk  Goal: Absence of Fall and Fall-Related Injury  Intervention: Promote Injury-Free Environment  Recent Flowsheet Documentation  Taken 4/17/2021 0020 by Beth Chadwick RN  Safety Promotion/Fall Prevention:   activity supervised   assistive device/personal items within reach   clutter free environment maintained   fall prevention program maintained   safety round/check completed   nonskid shoes/slippers when out of bed  Taken 4/16/2021 2235 by Beth Chadwick RN  Safety Promotion/Fall Prevention:   activity supervised   clutter free environment maintained   assistive device/personal items within reach   fall prevention program maintained   safety round/check completed   nonskid shoes/slippers when out of bed  Taken 4/16/2021 2120 by Beth Chdawick RN  Safety Promotion/Fall Prevention:   activity supervised   clutter free environment maintained   assistive device/personal items within reach   fall prevention program maintained   safety round/check completed   nonskid shoes/slippers when out of bed  Taken 4/16/2021 2041 by Beth Chadwick RN  Safety Promotion/Fall Prevention:   activity supervised   assistive device/personal items within reach   clutter free environment maintained   fall prevention program maintained   safety round/check completed   nonskid shoes/slippers when out of bed  Taken 4/16/2021 1918 by Beth Chadwick RN  Safety Promotion/Fall Prevention:   activity supervised   assistive  device/personal items within reach   clutter free environment maintained   fall prevention program maintained   safety round/check completed   nonskid shoes/slippers when out of bed     Problem: Restraint, Nonbehavioral (Nonviolent)  Goal: Discontinuation Criteria Achieved  Outcome: Ongoing, Progressing     Problem: Restraint, Nonbehavioral (Nonviolent)  Goal: Discontinuation Criteria Achieved  Intervention: Implement Least-restrictive Safety Strategies  Recent Flowsheet Documentation  Taken 4/16/2021 2041 by Beth Chadwick RN  Diversional Activities: television     Problem: Restraint, Nonbehavioral (Nonviolent)  Goal: Personal Dignity and Safety Maintained  Outcome: Ongoing, Progressing     Problem: Restraint, Nonbehavioral (Nonviolent)  Goal: Personal Dignity and Safety Maintained  Intervention: Protect Dignity, Rights, and Personal Wellbeing  Recent Flowsheet Documentation  Taken 4/16/2021 2041 by Beth Chadwick RN  Trust Relationship/Rapport:   care explained   questions answered   thoughts/feelings acknowledged     Problem: Restraint, Nonbehavioral (Nonviolent)  Goal: Personal Dignity and Safety Maintained  Intervention: Protect Skin and Joint Integrity  Recent Flowsheet Documentation  Taken 4/17/2021 0020 by Beth Chadwick RN  Body Position: dangle, side of bed  Taken 4/16/2021 2235 by Beth Chadwick RN  Body Position: dangle, side of bed  Taken 4/16/2021 2041 by Beth Chadwick RN  Body Position: sitting up in bed   Goal Outcome Evaluation:     Progress: no change  Outcome Summary: pt vs stable, pt very restless overnight, family at bedside, prn pain meds given.

## 2021-04-17 NOTE — THERAPY TREATMENT NOTE
Acute Care - Physical Therapy Treatment Note  [facility]     Patient Name: Eze Downing  : 1935  MRN: [id number]  Today's Date: 2021   Onset of Illness/Injury or Date of Surgery: 04/10/21       PT Assessment (last 12 hours)      PT Evaluation and Treatment     Row Name 21 0955          Physical Therapy Time and Intention    Subjective Information  weakness;fatigue;pain  -LN     Document Type  therapy note (daily note)  -LN     Mode of Treatment  individual therapy;physical therapy  -LN     Comment  son present and encouraging pt to work with pta  -LN     Row Name 21 0955          General Information    Patient Profile Reviewed  yes  -LN     Existing Precautions/Restrictions  fall  -LN     Row Name 21 0955          Cognition    Orientation Status (Cognition)  oriented to;person;place pt/wife report pt does not keep up with date since he retired  -LN     Row Name 21 0955          Pain Scale: Numbers Pre/Post-Treatment    Pretreatment Pain Rating  8/10  -LN     Posttreatment Pain Rating  8/10  -LN     Pain Location - Orientation  generalized  -LN     Pre/Posttreatment Pain Comment  -- meds not due  -LN     Row Name 21 0955          Bed Mobility    Bed Mobility  sit-supine;rolling left;rolling right;scooting/bridging  -LN     Rolling Left [place] (Bed Mobility)  standby assist  -LN     Rolling Right [place] (Bed Mobility)  standby assist  -LN     Scooting/Bridging [place] (Bed Mobility)  maximum assist (25% patient effort);2 person assist up in bed  -LN     Supine-Sit [place] (Bed Mobility)  not tested  -LN     Sit-Supine [place] (Bed Mobility)  supervision  -LN     Assistive Device (Bed Mobility)  bed rails  -LN     Row Name 21 0955          Transfers    Bed-Chair [place] (Transfers)  not tested  -LN     Sit-Stand [place] (Transfers)  verbal cues;nonverbal cues (demo/gesture);minimum assist (75% patient effort)  -      Stand-Sit [place] (Transfers)  contact guard  -LN     Row Name 04/17/21 0955          Sit-Stand Transfer    Assistive Device (Sit-Stand Transfers)  cane, straight  -LN     Row Name 04/17/21 0955          Stand-Sit Transfer    Assistive Device (Stand-Sit Transfers)  cane, straight  -LN     Row Name 04/17/21 0955          Gait/Stairs (Locomotion)    [place] Level (Gait)  moderate assist (50% patient effort);1 person assist  -LN     Assistive Device (Gait)  cane, straight  -LN     Distance in Feet (Gait)  12x2  -LN     Pattern (Gait)  step-to  -LN     Deviations/Abnormal Patterns (Gait)  base of support, narrow;gait speed decreased;stride length decreased  -LN     Bilateral Gait Deviations  forward flexed posture  -LN     Comment (Gait/Stairs)  knees did not fully extend with gt  -LN     Row Name 04/17/21 0955          Safety Issues, Functional Mobility    Impairments Affecting Function (Mobility)  balance;endurance/activity tolerance;strength  -LN     Row Name 04/17/21 0955          Plan of Care Review    Plan of Care Reviewed With  patient  -LN     Outcome Summary  sit-sup sba of 1,sit-stand-sit min/cga of 1;amb 12'x2 with sc and mod of 1-pt with fair balance and cruising furniture;reccommended pt use rw at home until stronger-family/pt agreed  -LN     Row Name 04/17/21 0955          Vital Signs    Pre Systolic BP Rehab  100  -LN     Pre Treatment Diastolic BP  59  -LN     Post Systolic BP Rehab  140  -LN     Post Treatment Diastolic BP  65  -LN     Pretreatment Heart Rate (beats/min)  85  -LN     Posttreatment Heart Rate (beats/min)  87  -LN     Pre SpO2 (%)  94  -LN     O2 Delivery Pre Treatment  room air  -LN     Post SpO2 (%)  92  -LN     Pre Patient Position  Sitting  -LN     Intra Patient Position  Standing  -LN     Post Patient Position  Supine  -LN     Row Name 04/17/21 0955          Bed Mobility Goal 1 (PT)    Activity/Assistive Device (Bed Mobility Goal 1, PT)  sit to supine  -LN     Major  Level/Cues Needed (Bed Mobility Goal 1, PT)  independent  -LN     Time Frame (Bed Mobility Goal 1, PT)  2 days  -LN     Progress/Outcomes (Bed Mobility Goal 1, PT)  goal not met  -LN     Row Name 04/17/21 0955          Transfer Goal 1 (PT)    Activity/Assistive Device (Transfer Goal 1, PT)  sit-to-stand/stand-to-sit;bed-to-chair/chair-to-bed  -LN     [place] Level/Cues Needed (Transfer Goal 1, PT)  standby assist;modified independence  -LN     Time Frame (Transfer Goal 1, PT)  5 days  -LN     Progress/Outcome (Transfer Goal 1, PT)  goal not met  -LN     Row Name 04/17/21 0955          Gait Training Goal 1 (PT)    Activity/Assistive Device (Gait Training Goal 1, PT)  gait (walking locomotion);assistive device use  -LN     [place] Level (Gait Training Goal 1, PT)  contact guard assist;standby assist  -LN     Distance (Gait Training Goal 1, PT)  50ft or more x2  -LN     Time Frame (Gait Training Goal 1, PT)  1 week  -LN     Progress/Outcome (Gait Training Goal 1, PT)  goal not met  -LN     Row Name 04/17/21 0955          ROM Goal 1 (PT)    ROM Goal 1 (PT)  Pt will tolerate LE exercises OOB in chair with VSS  -LN     Time Frame (ROM Goal 1, PT)  by discharge  -LN     Progress/Outcome (ROM Goal 1, PT)  goal not met  -LN     Row Name 04/17/21 0955          Positioning and Restraints    Post Treatment Position  bed  -LN     In Bed  supine;call light within reach;encouraged to call for assist;exit alarm on;with family/caregiver  -LN     Row Name 04/17/21 0955          Therapy Assessment/Plan (PT)    Rehab Potential (PT)  good, to achieve stated therapy goals  -LN     Criteria for Skilled Interventions Met (PT)  yes;meets criteria;skilled treatment is necessary  -LN       User Key  (r) = Recorded By, (t) = Taken By, (c) = Cosigned By    Initials Name Provider Type    Unique Wilson PTA Physical Therapy Assistant        Physical Therapy Education                 Title: PT OT SLP Therapies (In Progress)     Topic:  Physical Therapy (In Progress)     Point: Mobility training (In Progress)     Learning Progress Summary           Patient Acceptance, E, NR by  at 4/16/2021 1209                   Point: Home exercise program (Not Started)     Learner Progress:  Not documented in this visit.          Point: Body mechanics (In Progress)     Learning Progress Summary           Patient Acceptance, E, NR by MICHELLE at 4/16/2021 1209                   Point: Precautions (In Progress)     Learning Progress Summary           Patient Acceptance, E, NR by MICHELLE at 4/16/2021 1209                               User Key     Initials Effective Dates Name Provider Type Discipline    MICHELLE 04/03/18 -  Loraine Cole, PT Physical Therapist PT              PT Recommendation and Plan  Anticipated Discharge Disposition (PT): home with 24/7 care, home with home health  Therapy Frequency (PT): daily  Plan of Care Reviewed With: patient  Outcome Summary: sit-sup sba of 1,sit-stand-sit min/cga of 1;amb 12'x2 with sc and mod of 1-pt with fair balance and cruising furniture;reccommended pt use rw at home until stronger-family/pt agreed  Outcome Measures     Row Name 04/15/21 7051             How much help from another is currently needed...    Putting on and taking off regular lower body clothing?  2  -RB      Bathing (including washing, rinsing, and drying)  2  -RB      Toileting (which includes using toilet bed pan or urinal)  2  -RB      Putting on and taking off regular upper body clothing  2  -RB      Taking care of personal grooming (such as brushing teeth)  3  -RB      Eating meals  4  -RB      AM-PAC 6 Clicks Score (OT)  15  -RB         Functional Assessment    Outcome Measure Options  AM-PAC 6 Clicks Daily Activity (OT)  -RB        User Key  (r) = Recorded By, (t) = Taken By, (c) = Cosigned By    Initials Name Provider Type    RB Paolo Arriaga, OT Occupational Therapist           Time Calculation:   PT Charges     Row Name 04/17/21 1303             Time  Calculation    Start Time  0955  -LN      Stop Time  1025  -LN      Time Calculation (min)  30 min  -LN      PT Received On  04/17/21  -LN         Time Calculation- PT    Total Timed Code Minutes- PT  30 minute(s)  -LN        User Key  (r) = Recorded By, (t) = Taken By, (c) = Cosigned By    Initials Name Provider Type    LN Unique Dugan PTA Physical Therapy Assistant        Therapy Charges for Today     Code Description Service Date Service Provider Modifiers Qty    81360960704 HC PT THERAPEUTIC ACT EA 15 MIN 4/17/2021 Unique Dugan PTA GP 1    09102600192 HC GAIT TRAINING EA 15 MIN 4/17/2021 Unique Dugan PTA GP 1          PT G-Codes  Outcome Measure Options: AM-PAC 6 Clicks Basic Mobility (PT)  AM-PAC 6 Clicks Score (PT): 14  AM-PAC 6 Clicks Score (OT): 14    Unique Dugan PTA  4/17/2021

## 2021-04-17 NOTE — PLAN OF CARE
Goal Outcome Evaluation:  Plan of Care Reviewed With: patient     Outcome Summary: sit-sup sba of 1,sit-stand-sit min/cga of 1;amb 12'x2 with sc and mod of 1-pt with fair balance and cruising furniture;reccommended pt use rw at home until stronger-family/pt agreed

## 2021-04-18 NOTE — OUTREACH NOTE
Prep Survey      Responses   [facility] facility patient discharged from?  [place]   Is LACE score < 7 ?  No   Emergency Room discharge w/ pulse ox?  No   Eligibility  Readm Mgmt   Discharge diagnosis  NSTEMI (non-ST elevated myocardial infarction   Does the patient have one of the following disease processes/diagnoses(primary or secondary)?  Acute MI (STEMI,NSTEMI)   Does the patient have Home health ordered?  Yes   What is the Home health agency?   formerly [facility]    Medication alerts for this patient  ASA, Digoxin, Lasix    General alerts for this patient  Heart Cath    Prep survey completed?  Yes          Africa Bravo RN

## 2021-04-19 NOTE — OUTREACH NOTE
AMI Week 1 Survey      Responses   [facility] patient discharged from?  [place]   Does the patient have one of the following disease processes/diagnoses(primary or secondary)?  Acute MI (STEMI,NSTEMI)   Week 1 attempt successful?  Yes   Call start time  1404   Call end time  1409   General alerts for this patient  Heart Cath    Discharge diagnosis  NSTEMI (non-ST elevated myocardial infarction   Is patient permission given to speak with other caregiver?  Yes   List who call center can speak with  wife   Person spoke with today (if not patient) and relationship  wife   Meds reviewed with patient/caregiver?  Yes   Is the patient having any side effects they believe may be caused by any medication additions or changes?  Yes   Side effects comments   drowsiness after getting his morning meds.    Does the patient have all prescriptions related to this admission filled (includes statins,anticoagulants,HTN meds,anti-arrhythmia meds)  Yes   Is the patient taking all medications as directed (includes completed medication regime)?  Yes   Does the patient have a primary care provider?   Yes   Does the patient have an appointment with their PCP,cardiologist,or clinic within 7 days of discharge?  Yes   Comments regarding PCP  4/21/2021 f/u with PCP   Has the patient kept scheduled appointments due by today?  N/A   What is the Home health agency?   [facility]    Has home health visited the patient within 72 hours of discharge?  Yes   Psychosocial issues?  No   Comments  SOA is his baseline, per wife. Denies chest pain. [facility] site- right groin no bruising or edema present.    Did the patient receive a copy of their discharge instructions?  Yes   Nursing interventions  Reviewed instructions with patient   What is the patient's perception of their health status since discharge?  Improving   Is the patient/caregiver able to teach back signs and symptoms of when to call for help immediately:  Sudden chest discomfort, Sudden  discomfort in arms, back, neck or jaw, Sudden sweating or clammy skin, Nausea or vomiting   Is the pateint /caregiver able to teach back the importance of cardiac rehab?  Yes   Is the patient/caregiver able to teach back lifestyle changes to help prevent MIs  Regular exercise as approved by provider   Is the patient/caregiver able to teach back ways to prevent a second heart attack:  Take medications, Follow up with MD   If the patient is a current smoker, are they able to teach back resources for cessation?  Not a smoker   Is the patient/caregiver able to teach back the hierarchy of who to call/visit for symptoms/problems? PCP, Specialist, Home health nurse, Urgent Care, ED, 911  Yes   Week 1 call completed?  Yes          Ngozi Izquierdo RN

## 2021-04-26 NOTE — OUTREACH NOTE
AMI Week 2 Survey      Responses   [facility] patient discharged from?  [place]   Does the patient have one of the following disease processes/diagnoses(primary or secondary)?  Acute MI (STEMI,NSTEMI)   Week 2 attempt successful?  Yes   Call start time  1402   Call end time  1408   General alerts for this patient  Heart Cath    Discharge diagnosis  NSTEMI (non-ST elevated myocardial infarction   Is patient permission given to speak with other caregiver?  Yes   List who call center can speak with  wife or dtr   Person spoke with today (if not patient) and relationship  dtr   Medication alerts for this patient  asking for stronger pain meds, advised had to call PCP as she was the prescriber of the current pain meds.    Meds reviewed with patient/caregiver?  Yes   Is the patient having any side effects they believe may be caused by any medication additions or changes?  Yes   Side effects comments   drowsiness after getting his morning meds. Has improved. MD aware and felt was WNL   Does the patient have all prescriptions related to this admission filled (includes statins,anticoagulants,HTN meds,anti-arrhythmia meds)  Yes   Is the patient taking all medications as directed (includes completed medication regime)?  Yes   Does the patient have a primary care provider?   Yes   Does the patient have an appointment with their PCP,cardiologist,or clinic within 7 days of discharge?  Yes   Comments regarding PCP  4/21/2021 f/u with PCP   Has the patient kept scheduled appointments due by today?  Yes   What is the Home health agency?   [facility]    Has home health visited the patient within 72 hours of discharge?  Yes   Psychosocial issues?  No   Comments  SOA is his baseline, per wife. Pt had CP last that was relieved by 1 SL NTG, advised to alert Cardiology. [facility] site- right groin no bruising or edema present. Pt c/o severe pain in hip/back, pain meds not helping. Pt did have CPR during stay.   What is the patient's  perception of their health status since discharge?  Improving   Is the patient/caregiver able to teach back signs and symptoms of when to call for help immediately:  Sudden chest discomfort, Sudden discomfort in arms, back, neck or jaw, Shortness of breath at any time   Is the patient/caregiver able to teach back lifestyle changes to help prevent MIs  Heart healthy diet, Regular exercise as approved by provider   Is the patient/caregiver able to teach back ways to prevent a second heart attack:  Take medications, Follow up with MD   Is the patient/caregiver able to teach back the hierarchy of who to call/visit for symptoms/problems? PCP, Specialist, Home health nurse, Urgent Care, ED, 911  Yes   Week 2 call completed?  Yes          Ngozi Izquierdo RN

## 2021-05-05 NOTE — OUTREACH NOTE
AMI Week 3 Survey      Responses   [facility] patient discharged from?  [place]   Does the patient have one of the following disease processes/diagnoses(primary or secondary)?  Acute MI (STEMI,NSTEMI)   Week 3 attempt successful?  Yes   Call start time  1608   Call end time  1616   Discharge diagnosis  NSTEMI (non-ST elevated myocardial infarction)   Is patient permission given to speak with other caregiver?  Yes   List who call center can speak with  wife or dtr   Person spoke with today (if not patient) and relationship  wife   Meds reviewed with patient/caregiver?  Yes   Does the patient have all prescriptions related to this admission filled (includes statins,anticoagulants,HTN meds,anti-arrhythmia meds)  Yes   Is the patient taking all medications as directed (includes completed medication regime)?  Yes   Does the patient have a primary care provider?   Yes   Does the patient have an appointment with their PCP,cardiologist,or clinic within 7 days of discharge?  Yes   Has the patient kept scheduled appointments due by today?  Yes   Comments  Cardiology appt 5/19/21   What is the Home health agency?   [facility]    Psychosocial issues?  No   Did the patient receive a copy of their discharge instructions?  Yes   Nursing interventions  Reviewed instructions with patient [wife]   What is the patient's perception of their health status since discharge?  Improving [Wife states patient just complains that he is tired. ]   Is the patient/caregiver able to teach back signs and symptoms of when to call for help immediately:  Sudden chest discomfort, Sudden discomfort in arms, back, neck or jaw, Shortness of breath at any time, Sudden sweating or clammy skin, Dizziness or lightheadedness, Irregular or rapid heart rate   Nursing interventions  Nurse provided patient education   Is the patient/caregiver able to teach back lifestyle changes to help prevent MIs  Heart healthy diet [Wife to discuss appetite stimulating med  with physician. ]   Is the patient/caregiver able to teach back ways to prevent a second heart attack:  Take medications, Follow up with MD, Manage risk factors   If the patient is a current smoker, are they able to teach back resources for cessation?  Not a smoker   Is the patient/caregiver able to teach back the hierarchy of who to call/visit for symptoms/problems? PCP, Specialist, Home health nurse, Urgent Care, ED, 911  Yes   Week 3 call completed?  Yes          Odalis Gong RN

## 2021-05-13 NOTE — OUTREACH NOTE
AMI Week 4 Survey      Responses   [facility] patient discharged from?  [place]   Does the patient have one of the following disease processes/diagnoses(primary or secondary)?  Acute MI (STEMI,NSTEMI)   Week 4 attempt successful?  Yes   Call start time  1820   Call end time  1824   General alerts for this patient  Heart Cath    Discharge diagnosis  NSTEMI (non-ST elevated myocardial infarction)   Is patient permission given to speak with other caregiver?  Yes   List who call center can speak with  wife or dtr   Person spoke with today (if not patient) and relationship  Dtr   Meds reviewed with patient/caregiver?  Yes   Is the patient having any side effects they believe may be caused by any medication additions or changes?  Yes   Side effects comments   He has a rash chest neck and down the shoulder. There are open sores.  PCP is aware.    Is the patient taking all medications as directed (includes completed medication regime)?  Yes   Has the patient kept scheduled appointments due by today?  Yes   Comments  Cardiology appt 5/19/21   Is the patient still receiving Home Health Services?  Yes   Psychosocial issues?  No   Comments  Wife and daughter is with him.    What is the patient's perception of their health status since discharge?  New symptoms unrelated to diagnosis [He has open sores from the rash. The MD is aware. ]   Is the patient/caregiver able to teach back signs and symptoms of when to call for help immediately:  Sudden chest discomfort, Sudden discomfort in arms, back, neck or jaw, Shortness of breath at any time, Sudden sweating or clammy skin, Dizziness or lightheadedness, Irregular or rapid heart rate   Nursing interventions  Nurse provided patient education   Is the pateint /caregiver able to teach back the importance of cardiac rehab?  Yes   Is the patient/caregiver able to teach back lifestyle changes to help prevent MIs  Heart healthy diet   Is the patient/caregiver able to teach back  ways to prevent a second heart attack:  Take medications, Follow up with MD, Manage risk factors   If the patient is a current smoker, are they able to teach back resources for cessation?  Not a smoker   Is the patient/caregiver able to teach back the hierarchy of who to call/visit for symptoms/problems? PCP, Specialist, Home health nurse, Urgent Care, ED, 911  Yes   Additional teach back comments  /63.    Week 4 call completed?  Yes   Would the patient like one additional call?  Yes          Chiquita Vigil RN

## 2021-05-19 PROBLEM — I25.5 ISCHEMIC CARDIOMYOPATHY: Status: ACTIVE | Noted: 2021-01-01

## 2021-05-19 PROBLEM — I50.20 HFREF (HEART FAILURE WITH REDUCED EJECTION FRACTION) (HCC): Status: ACTIVE | Noted: 2021-01-01

## 2021-05-19 NOTE — PROGRESS NOTES
Eze Downing  85 y.o. male    05/19/2021  1. Ischemic cardiomyopathy    2. HFrEF (heart failure with reduced ejection fraction) (CMS/[facility])    3. Essential hypertension    4. NSTEMI (non-ST elevated myocardial infarction) (CMS/[facility])        History of Present Illness:  Mr. Eze Downing is a 85-year-old  male with past medical history of type 2 diabetes, hypertension, degenerative disc disease, chronic pain, BPH who presented to [facility], with shortness of breath and intermittent chest pain.  EKG was performed showing sinus tachycardia with right bundle branch block, rightward axis, diffuse ST-T changes with nonspecific findings.  Troponin levels have been elevated at 0.722 on admission and and peaked at 1.930 consistent with non-ST segment elevation myocardial infarction.  After detailed discussion with the family about different treatment options cardiac catheterization was performed and this showed the following findings:  Impression: Severe multivessel coronary artery disease with critical lesions noted in the left anterior descending coronary artery, diagonal coronary artery, ramus intermedius coronary artery, circumflex coronary artery, obtuse marginal coronary artery and right coronary artery as described above.  Extensive left to left and left to right collaterals.  Patient is known to have LV dysfunction with an ejection fraction of 20 to 25% by echocardiogram  The patient developed severe symptomatic bradycardia and asystole requiring resuscitation as per ACLS protocol for less than 2 minutes and placement of a temporary transvenous pacemaker as described above in detail.  The patient became hemodynamically stable and remained awake and responding appropriately to commands post resuscitation, maintaining his oxygenation.     · His case was discussed in detail with CT surgery and it was felt that he was not a candidate for surgical intervention because of his multiple  comorbidities and deconditioning and CKD.  His lesions were not amenable for multivessel intervention as well.  Hence maximal medical management was recommended.      Echocardiogram performed showed:  Estimated left ventricular EF = 25% Left ventricular ejection fraction appears to be 21 - 25%. Left ventricular systolic function is moderately decreased. The left ventricular cavity is mildly dilated. Mid/ Distal Septum, [place], Anterior wall severely hypokinetic/ dyskinetic Left ventricular diastolic function is consistent with (grade Ia w/high LAP) impaired relaxation.  · Mild to moderate mitral valve regurgitation is present.  · Estimated right ventricular systolic pressure from tricuspid regurgitation is markedly elevated (>55 mmHg).     The patient was a poor historian and most of the information was obtained from his wife and daughter.  He has had generalized fatigue, dyspnea on exertion and overall failure to thrive since discharge from the hospital.  His wife and daughter are having a very difficult time managing his situation.  Fortunately he has been compliant with his medications.  He did describe generalized aches and pains including neck pain, chest and back pain.  He has been prescribed pain medications by his primary care physician which is not relieved his symptoms.    SUBJECTIVE    Allergies   Allergen Reactions   • Zofran [Ondansetron Hcl] Other (See Comments)     Headache           Past Medical History:   Diagnosis Date   • Arthritis    • Broken ribs 01/05/2018   • Diabetes mellitus (CMS/[facility])    • Hypertension    • Inguinal hernia          Past Surgical History:   Procedure Laterality Date   • APPENDECTOMY  1980's   • CARDIAC CATHETERIZATION N/A 4/13/2021    Procedure: Left Heart Cath;  Surgeon: Juli Damon MD;  Location: [facility] INVASIVE LOCATION;  Service: Cardiovascular;  Laterality: N/A;   • CYSTOSCOPY N/A 4/25/2018    Procedure: CYSTOSCOPY;  Surgeon: Michael Forbes MD;   Location: [facility];  Service: Urology   • CYSTOSCOPY TRANSURETHRAL RESECTION OF PROSTATE N/A 2018    Procedure: CYSTOSCOPY TRANSURETHRAL RESECTION OF PROSTATE; PLACEMENT SUPRAPUBIC CATHETER;  Surgeon: Michael Forbes MD;  Location: [facility];  Service: Urology   • INGUINAL HERNIA REPAIR Left 2018    Procedure: OPEN REPAIR OF A LARGE INGUINAL HERNIA;  Surgeon: Ramin Collado MD;  Location: [facility];  Service: General         Family History   Problem Relation Age of Onset   • Heart disease Mother         MI   • No Known Problems Father          Social History     Socioeconomic History   • Marital status:      Spouse name: Not on file   • Number of children: Not on file   • Years of education: Not on file   • Highest education level: Not on file   Tobacco Use   • Smoking status: Former Smoker     Packs/day: 1.00     Types: Cigarettes     Quit date:      Years since quittin.3   • Smokeless tobacco: Never Used   • Tobacco comment: smoke for 50 years   Substance and Sexual Activity   • Alcohol use: Yes     Comment: occasionally   • Drug use: No   • Sexual activity: Defer         Current Outpatient Medications   Medication Sig Dispense Refill   • albuterol sulfate  (90 Base) MCG/ACT inhaler Inhale 2 puffs Every 4 (Four) Hours As Needed for Wheezing. 8 g 0   • aspirin 81 MG EC tablet Take 1 tablet by mouth Daily. 30 tablet 0   • carvedilol (COREG) 25 MG tablet Take 1 tablet by mouth 2 (Two) Times a Day With Meals. 60 tablet 0   • digoxin (LANOXIN) 125 MCG tablet Take 1 tablet by mouth Daily. 30 tablet 0   • finasteride (PROSCAR) 5 MG tablet Take 5 mg by mouth Daily.     • furosemide (LASIX) 20 MG tablet Take 1 tablet by mouth 2 (Two) Times a Day. 60 tablet 0   • losartan (COZAAR) 25 MG tablet Take 1 tablet by mouth Daily. 30 tablet 0   • nitroglycerin (NITROSTAT) 0.4 MG SL tablet Place 1 tablet under the tongue Every 5 (Five) Minutes As Needed for Chest Pain. Take no more than 3 doses in 15  "minutes. 15 tablet 12   • predniSONE (DELTASONE) 20 MG tablet Take 1 tablet by mouth Daily. 5 tablet 0   • ranolazine (RANEXA) 500 MG 12 hr tablet Take 1 tablet by mouth Every 12 (Twelve) Hours. 60 tablet 0   • rosuvastatin (CRESTOR) 10 MG tablet Take 1 tablet by mouth Every Night. 30 tablet 0   • tamsulosin (FLOMAX) 0.4 MG capsule 24 hr capsule Take 1 capsule by mouth Every Night.       No current facility-administered medications for this visit.         OBJECTIVE    Pulse 60   Temp 97.5 °F (36.4 °C)   Ht 170.2 cm (67.01\")   Wt 68.9 kg (152 lb)   SpO2 98%   BMI 23.80 kg/m²         Review of Systems : Information obtained from his wife and daughter    Constitutional:  Denies recent weight loss, weight gain, fever or chills.  Generalized fatigue     HENT:  Denies any hearing loss, epistaxis, hoarseness, or difficulty speaking.     Eyes: Wears eyeglasses or contact lenses     Respiratory:  Dyspnea with exertion,no cough, wheezing, or hemoptysis.     Cardiovascular: See HPI    Gastrointestinal:  Denies change in bowel habits, dyspepsia, ulcer disease, hematochezia, or melena.     Endocrine: Negative for cold intolerance, heat intolerance, polydipsia, polyphagia and polyuria    Neurological:  Denies any history of recurrent headaches, strokes, TIA, or seizure disorder.     Hematological: Denies any food allergies, seasonal allergies, bleeding disorders, or lymphadenopathy.        Physical Exam     Constitutional: Cooperative, in no acute distress.     HENT:   Head: Normocephalic, normal hair patterns, no masses or tenderness.  Ears, Nose, and Throat: No gross abnormalities. No pallor or cyanosis.   Eyes: EOMS intact, PERRL, conjunctivae and lids unremarkable. Fundoscopic exam and visual fields not performed.   Neck: No palpable masses or adenopathy, no thyromegaly, no JVD, carotid pulses are full and equal bilaterally and without  Bruits.     Cardiovascular: Regular rhythm, S1 and S2 normal, no S3 or S4.  No " murmurs, gallops, or rubs detected.     Pulmonary/Chest: Chest: Increased AP diameter of the chest, no use of accessory muscles.            Pulmonary: Breath sounds diminished in both lung bases.  No definite rhonchi or rales.    Abdominal: Abdomen soft, bowel sounds normoactive, no masses, no hepatosplenomegaly, non-tender, no bruits.     Musculoskeletal: No deformities, clubbing, cyanosis, erythema, or edema observed.     Neurological: No gross motor or sensory deficits noted, affect appropriate, oriented to time, person, place.     Skin: Warm and dry to the touch, no apparent skin lesions or masses noted.     Psychiatric: He has a normal mood and affect. His behavior is normal. Judgment and thought content normal.         Procedures      Lab Results   Component Value Date    WBC 12.75 (H) 04/17/2021    HGB 9.8 (L) 04/17/2021    HCT 29.1 (L) 04/17/2021    MCV 86.6 04/17/2021     04/17/2021     Lab Results   Component Value Date    GLUCOSE 254 (H) 04/17/2021    BUN 75 (H) 04/17/2021    CREATININE 1.50 (H) 04/17/2021    EGFRIFNONA 45 (L) 04/17/2021    BCR 50.0 (H) 04/17/2021    CO2 27.0 04/17/2021    CALCIUM 9.0 04/17/2021    ALBUMIN 4.20 04/10/2021    AST 48 (H) 04/10/2021    ALT 14 04/10/2021     Lab Results   Component Value Date    CHOL 152 04/11/2021     Lab Results   Component Value Date    TRIG 53 04/11/2021     Lab Results   Component Value Date    HDL 57 04/11/2021     No components found for: LDLCALC  Lab Results   Component Value Date    LDL 84 04/11/2021     No results found for: HDLLDLRATIO  No components found for: CHOLHDL  Lab Results   Component Value Date    HGBA1C 7.20 (H) 04/11/2021     No results found for: TSH, V6RHYDM, V4WVSFZ, THYROIDAB        ASSESSMENT AND PLAN  Eze Downing is a 85-year-old male with multiple medical issues as discussed in detail under history of present illness with end-stage ischemic cardiomyopathy with severe multivessel coronary artery disease with lesions not  amenable to intervention and he is not a candidate for surgical revascularization.  I had a long discussion with the patient and his daughter about his clinical condition and overall poor prognosis.  We are limited in terms of what treatment we can offer.  He is on a good combination of medications and have continued carvedilol, digoxin, Lasix, losartan, Ranexa for management of ischemic cardiomyopathy and lipid-lowering therapy with Crestor has been continued.  Antiplatelet therapy with aspirin has been continued.  Pain medications will be addressed by his primary care physician.    He has evidence of class IV CHF and probably has less than 6 months to live.    In view of this, she was seen by MART Damon to have a detailed discussion about end-stage management of congestive heart failure and I understand the family is willing to consider hospice.  The pros the cons were discussed by her in detail.    About 45 minutes were spent in the assessment and evaluation of this patient.    Diagnoses and all orders for this visit:    1. Ischemic cardiomyopathy (Primary)    2. HFrEF (heart failure with reduced ejection fraction) (CMS/[facility])    3. Essential hypertension    4. NSTEMI (non-ST elevated myocardial infarction) (CMS/[facility])        Patient's Body mass index is 23.8 kg/m². indicating that he is within normal range (BMI 18.5-24.9). No BMI management plan needed..      Eze Downing  reports that he quit smoking about 18 years ago. His smoking use included cigarettes. He smoked 1.00 pack per day. He has never used smokeless tobacco.        Juli Damon MD  5/19/2021  11:50 CDT

## 2021-05-24 NOTE — OUTREACH NOTE
AMI Week 5 Survey      Responses   [facility] patient discharged from?  [facility]   Does the patient have one of the following disease processes/diagnoses(primary or secondary)?  Acute MI (STEMI,NSTEMI)   Week 5 attempt successful?  Yes   Call start time  1407   Call end time  1408   General alerts for this patient  Heart Cath    Discharge diagnosis  NSTEMI (non-ST elevated myocardial infarction)   Is patient permission given to speak with other caregiver?  Yes   List who call center can speak with  [place]- Wife   Person spoke with today (if not patient) and relationship  [place]- Wife   Meds reviewed with patient/caregiver?  Yes   Is the patient having any side effects they believe may be caused by any medication additions or changes?  No   Is the patient taking all medications as directed (includes completed medication regime)?  Yes   Has the patient kept scheduled appointments due by today?  Yes   Is the patient still receiving Home Health Services?  N/A   Psychosocial issues?  No   What is the patient's perception of their health status since discharge?  Same   Nursing interventions  Nurse provided patient education   Is the patient/caregiver able to teach back signs and symptoms of when to call for help immediately:  Sudden chest discomfort, Sudden discomfort in arms, back, neck or jaw, Shortness of breath at any time, Sudden sweating or clammy skin, Nausea or vomiting, Dizziness or lightheadedness, Irregular or rapid heart rate   Nursing interventions  Nurse provided patient education   Is the patient/caregiver able to teach back lifestyle changes to help prevent MIs  Regular exercise as approved by provider, Heart healthy diet   Is the patient/caregiver able to teach back ways to prevent a second heart attack:  Take medications, Follow up with MD   If the patient is a current smoker, are they able to teach back resources for cessation?  Not a smoker   Is the patient/caregiver able to teach back the  hierarchy of who to call/visit for symptoms/problems? PCP, Specialist, Home health nurse, Urgent Care, ED, 911  Yes   Graduated  Yes   Is the patient interested in additional calls from an ambulatory ?  NOTE:  applies to high risk patients requiring additional follow-up.  No   Did the patient feel the follow up calls were helpful during their recovery period?  Yes   Was the number of calls appropriate?  Yes   Wrap up additional comments  He has entered hospice care at this time          Tana Smith RN

## (undated) DEVICE — INTRO CATH SUPRAPUB LAWRENCE 16FORNG

## (undated) DEVICE — ELECTRODE,RT,STRESS,FOAM,50PK: Brand: MEDLINE

## (undated) DEVICE — INTRO SHEATH ART/FEM ENGAGE .038 6F12CM

## (undated) DEVICE — GLV SURG NEOLON 2G PF LF 7.5 STRL

## (undated) DEVICE — CATH FOL DOVER LTX SIL/ELAST 3WY 24F 30CC

## (undated) DEVICE — GW PERIPH GUIDERIGHT STD/J/TP PTFE/PCOAT SS 0.038IN 5X150CM

## (undated) DEVICE — CABL PACE ATRIAL PT BLU

## (undated) DEVICE — A2000 MULTI-USE SYRINGE KIT, P/N 701277-003KIT CONTENTS: 100ML CONTRAST RESERVOIR AND TUBING WITH CONTRAST SPIKE AND CLAMP: Brand: A2000 MULTI-USE SYRINGE KIT

## (undated) DEVICE — CATH F6INF TL 3DRC 100CM: Brand: INFINITI

## (undated) DEVICE — ST CVR PROB PULLUP ULTRASND 5X48IN

## (undated) DEVICE — SYR LUERLOK 50ML

## (undated) DEVICE — GLV SURG TRIUMPH PF LTX 6.5 STRL

## (undated) DEVICE — DRSNG SPNG GZ WOVN 8PLY 4X4IN 2PK LF STRL BX/50PK

## (undated) DEVICE — GLV SURG TRIUMPH LT PF LTX 7.5 STRL

## (undated) DEVICE — PK CYSTO LF 60

## (undated) DEVICE — SOL IRR H2O BTL 1000ML STRL

## (undated) DEVICE — STERILE POLYISOPRENE POWDER-FREE SURGICAL GLOVES WITH EMOLLIENT COATING: Brand: PROTEXIS

## (undated) DEVICE — GOWN ,SIRUS ,NONREINFORCED 4XL: Brand: MEDLINE

## (undated) DEVICE — IRRIGATOR TOOMEY 70CC

## (undated) DEVICE — 3M™ IOBAN™ 2 ANTIMICROBIAL INCISE DRAPE 6651EZ: Brand: IOBAN™ 2

## (undated) DEVICE — SPNG GZ WOVN 4X4IN 12PLY 10/BX STRL

## (undated) DEVICE — SLV REPOSTNG CATH STRL 60CM

## (undated) DEVICE — SUT VIC 3/0 TIES 18IN J110T

## (undated) DEVICE — TP SKIN FABRC WATERPRF 2IN 5YD

## (undated) DEVICE — SUT VIC 2/0 TIES 18IN J111T

## (undated) DEVICE — GLV SURG NEOLON 2G PF LF 6.5 STRL

## (undated) DEVICE — SOL IRR NACL 0.9PCT BT 1000ML

## (undated) DEVICE — SUT VIC 3/0 SH 27IN J416H

## (undated) DEVICE — CATH PACE PACEL BIPOL 5F110CM

## (undated) DEVICE — SOL IRR GLYCNE 1.5PCT 3000ML

## (undated) DEVICE — DRAINBAG,ANTI-REFLUX TOWER,L/F,2000ML,LL: Brand: MEDLINE

## (undated) DEVICE — CATHETER,FOLEY,100%SILICONE,16FR,10ML,LF: Brand: MEDLINE

## (undated) DEVICE — ANTIBACTERIAL UNDYED BRAIDED (POLYGLACTIN 910), SYNTHETIC ABSORBABLE SUTURE: Brand: COATED VICRYL

## (undated) DEVICE — SOL PVPI SPRY BETADINE 3OZ

## (undated) DEVICE — SOL IRR UROLOGIC GYLCINE 1.5% BT 2000ML

## (undated) DEVICE — PK CATH LAB 60

## (undated) DEVICE — 3M™ STERI-STRIP™ REINFORCED ADHESIVE SKIN CLOSURES, R1547, 1/2 IN X 4 IN (12 MM X 100 MM), 6 STRIPS/ENVELOPE: Brand: 3M™ STERI-STRIP™

## (undated) DEVICE — CONTAINER,SPECIMEN,OR STERILE,4OZ: Brand: MEDLINE

## (undated) DEVICE — PAD GRND REM POLYHESIVE A/ DISP

## (undated) DEVICE — MODEL BT2000 P/N 700287-012KIT CONTENTS: MANIFOLD WITH SALINE AND CONTRAST PORTS, SALINE TUBING WITH SPIKE AND HAND SYRINGE, TRANSDUCER: Brand: BT2000 AUTOMATED MANIFOLD KIT

## (undated) DEVICE — SUT VIC 2/0 SH 27IN

## (undated) DEVICE — GLV SURG SENSICARE GREEN W/ALOE PF LF 6 STRL

## (undated) DEVICE — TUBING, SUCTION, 3/16" X 6', STRAIGHT: Brand: MEDLINE

## (undated) DEVICE — GLV SURG SENSICARE GREEN W/ALOE PF LF 8 STRL

## (undated) DEVICE — SUT PDS 0 CT2 27IN DYED Z334H

## (undated) DEVICE — GAUZE,SPONGE,4"X4",16PLY,XRAY,STRL,LF: Brand: MEDLINE

## (undated) DEVICE — ADHS LIQ MASTISOL 2/3ML

## (undated) DEVICE — GLV SURG TRIUMPH PF LTX 5.5 STRL

## (undated) DEVICE — CATH DIAG EXPO .056 ARMOD 6F 100CM

## (undated) DEVICE — DRN PENRS 1/2X18IN LTX

## (undated) DEVICE — SOL IRRG H2O PL/BG 1000ML STRL

## (undated) DEVICE — PK MAJ PROC LF 60

## (undated) DEVICE — ELECTRD LP CUT 24/26F YEL

## (undated) DEVICE — ADHS SKIN MASTISOL CAP 2OZ DISP

## (undated) DEVICE — EVAC BLDR UROVAC W ADAPT

## (undated) DEVICE — SUT VICRYL 3-0 SH-1 PO 18IN J772D

## (undated) DEVICE — ARTERIAL NEEDLE: Brand: UNBRANDED

## (undated) DEVICE — INTRO SHEATH ULTIMUM ACT 5F

## (undated) DEVICE — GLV SURG SENSICARE GREEN W/ALOE PF LF 7 STRL

## (undated) DEVICE — CATH DIAG EXPO M/ PK 6FR FL4/FR4 PIG 3PK